# Patient Record
Sex: FEMALE | Race: WHITE | NOT HISPANIC OR LATINO | Employment: OTHER | URBAN - METROPOLITAN AREA
[De-identification: names, ages, dates, MRNs, and addresses within clinical notes are randomized per-mention and may not be internally consistent; named-entity substitution may affect disease eponyms.]

---

## 2017-06-22 ENCOUNTER — OFFICE VISIT (OUTPATIENT)
Dept: GASTROENTEROLOGY | Facility: CLINIC | Age: 82
End: 2017-06-22

## 2017-06-22 VITALS
DIASTOLIC BLOOD PRESSURE: 82 MMHG | TEMPERATURE: 97.9 F | SYSTOLIC BLOOD PRESSURE: 124 MMHG | HEIGHT: 62 IN | BODY MASS INDEX: 28.63 KG/M2 | WEIGHT: 155.6 LBS

## 2017-06-22 DIAGNOSIS — R93.3 ABNORMAL ESOPHAGRAM: ICD-10-CM

## 2017-06-22 DIAGNOSIS — K21.9 GASTROESOPHAGEAL REFLUX DISEASE, ESOPHAGITIS PRESENCE NOT SPECIFIED: ICD-10-CM

## 2017-06-22 DIAGNOSIS — R13.10 DYSPHAGIA, UNSPECIFIED TYPE: Primary | ICD-10-CM

## 2017-06-22 DIAGNOSIS — R11.0 NAUSEA: ICD-10-CM

## 2017-06-22 PROCEDURE — 99214 OFFICE O/P EST MOD 30 MIN: CPT | Performed by: NURSE PRACTITIONER

## 2017-06-22 RX ORDER — ATENOLOL 25 MG/1
25 TABLET ORAL DAILY
COMMUNITY
End: 2018-05-18 | Stop reason: SDUPTHER

## 2017-06-22 RX ORDER — LEVOTHYROXINE SODIUM 100 UG/1
100 CAPSULE ORAL DAILY
COMMUNITY
End: 2019-09-30 | Stop reason: ALTCHOICE

## 2017-06-22 RX ORDER — GLIMEPIRIDE 4 MG/1
4 TABLET ORAL 2 TIMES DAILY
COMMUNITY
End: 2020-09-25

## 2017-06-22 RX ORDER — ERGOCALCIFEROL 1.25 MG/1
50000 CAPSULE ORAL WEEKLY
COMMUNITY
End: 2019-09-30 | Stop reason: ALTCHOICE

## 2017-06-22 RX ORDER — OMEPRAZOLE 20 MG/1
20 CAPSULE, DELAYED RELEASE ORAL 2 TIMES DAILY
Qty: 180 CAPSULE | Refills: 3 | Status: SHIPPED | OUTPATIENT
Start: 2017-06-22 | End: 2021-12-01 | Stop reason: SDUPTHER

## 2017-06-22 RX ORDER — OMEPRAZOLE 20 MG/1
20 CAPSULE, DELAYED RELEASE ORAL DAILY
COMMUNITY
End: 2017-06-22 | Stop reason: SDUPTHER

## 2017-06-22 RX ORDER — PIOGLITAZONEHYDROCHLORIDE 15 MG/1
15 TABLET ORAL DAILY
COMMUNITY
End: 2018-04-12 | Stop reason: HOSPADM

## 2017-06-22 RX ORDER — SODIUM CHLORIDE 0.9 % (FLUSH) 0.9 %
1-10 SYRINGE (ML) INJECTION AS NEEDED
Status: CANCELLED | OUTPATIENT
Start: 2017-06-22

## 2017-06-22 NOTE — PROGRESS NOTES
"Chief Complaint   Patient presents with   • Nausea   • Abdominal Pain     Subjective     HPI  Lisbet Wilks is a 83 y.o. female who presents for a follow up. Her last office visit with Dr. Fernandez was August 2014 after EGD and colonoscopy July 2014. EGD with gastritis negative H. Pylori.  Colonoscopy with nonbleeding internal hemorrhoids, diverticular disease of sigmoid colon, otherwise normal.   She is here today for complaints of throat irritation, nausea daily, excessive burping, and trouble swallowing.  She states on a daily basis she has difficulty swallowing her pills. They get \"stuck in her throat\"  Forcing her to regurgitate them back up.  She has simialar trouble swallowing solid foods.  She was seen by Dr. Martinez, her ENT, 2 weeks ago for these symptoms and recommended she follow up with GI. Dr. Martinez had ordered an esophagram  in 2015 for complaints of dysphagia.  She was instructed to follow-up with GI at that time but hoped that her symptoms would resolve.  Esophagram in 2015 demonstrated a small sliding hiatal herniation, normal caliber esophagogastric junction, suboptimal esophageal peristalsis, and some spasticity in the distal thoracic esophagus with retention of barium liquid mixture in the esophagus when supine.  Her  trouble swallowing has worsened and is now a  daily occurrence.   She is a long-standing type II diabetic.  She reports early satiety and inability to eat anything but very small meals.  She denies vomiting, hematemesis, odynophagia, melena, or bright red blood per rectum.  Her weight is stable.    Past Medical History:   Diagnosis Date   • Diabetes    • Fatty liver    • Sleep apnea    • Thyroid disorder      Past Surgical History:   Procedure Laterality Date   • APPENDECTOMY     • CATARACT EXTRACTION     • COLONOSCOPY  07/15/2014    NBIH, tics,    • ENDOSCOPY  07/15/2014    acute gastritis   • HYSTERECTOMY     • THYROID BIOPSY     • TONSILLECTOMY         Social History     Social " History   • Marital status:      Spouse name: N/A   • Number of children: N/A   • Years of education: N/A     Social History Main Topics   • Smoking status: Former Smoker   • Smokeless tobacco: None   • Alcohol use No   • Drug use: None   • Sexual activity: Not Asked     Other Topics Concern   • None     Social History Narrative         Current Outpatient Prescriptions:   •  atenolol (TENORMIN) 25 MG tablet, Take 25 mg by mouth Daily., Disp: , Rfl:   •  glimepiride (AMARYL) 4 MG tablet, Take 4 mg by mouth 2 (Two) Times a Day., Disp: , Rfl:   •  levothyroxine (SYNTHROID, LEVOTHROID) 88 MCG tablet, Take 88 mcg by mouth Daily., Disp: , Rfl:   •  omeprazole (priLOSEC) 20 MG capsule, Take 1 capsule by mouth 2 (Two) Times a Day., Disp: 180 capsule, Rfl: 3  •  pioglitazone (ACTOS) 15 MG tablet, Take 15 mg by mouth Daily., Disp: , Rfl:   •  vitamin D (ERGOCALCIFEROL) 92086 UNITS capsule capsule, Take 50,000 Units by mouth 1 (One) Time Per Week., Disp: , Rfl:     Review of Systems   Constitutional: Negative for appetite change, chills, fatigue, fever and unexpected weight change.   HENT: Positive for sore throat and trouble swallowing.    Respiratory: Negative for choking and shortness of breath.    Cardiovascular: Negative for chest pain.   Gastrointestinal: Positive for nausea. Negative for abdominal distention, abdominal pain, blood in stool, constipation, diarrhea and vomiting.        Per HPI   Musculoskeletal: Negative for back pain.   Skin: Negative for color change.   Neurological: Negative for dizziness.   Hematological: Does not bruise/bleed easily.   Psychiatric/Behavioral: Negative.        Objective   Vitals:    06/22/17 1102   BP: 124/82   Temp: 97.9 °F (36.6 °C)       Physical Exam   Constitutional: She is oriented to person, place, and time. She appears well-developed and well-nourished.   HENT:   Head: Normocephalic and atraumatic.   Eyes: Pupils are equal, round, and reactive to light.    Cardiovascular: Normal rate, regular rhythm and normal heart sounds.    Pulmonary/Chest: Effort normal and breath sounds normal.   Abdominal: Soft. Bowel sounds are normal. She exhibits no shifting dullness, no distension, no pulsatile liver, no fluid wave, no abdominal bruit, no ascites, no pulsatile midline mass and no mass. There is no hepatosplenomegaly. There is no tenderness. There is no rigidity and no guarding. No hernia.   Musculoskeletal: Normal range of motion.   Neurological: She is alert and oriented to person, place, and time.   Skin: Skin is warm and dry.   Psychiatric: She has a normal mood and affect. Her behavior is normal. Thought content normal.   Nursing note and vitals reviewed.      Assessment/Plan   Violet was seen today for nausea and abdominal pain.    Diagnoses and all orders for this visit:    Dysphagia, unspecified type  Comments:  Trouble swallowing Pills and solids    Abnormal esophagram  Comments:  Esophagram 2015- patient was not seen until now for abnormal esophagram  Orders:  -     Case Request; Standing  -     sodium chloride 0.9 % flush 1-10 mL; Infuse 1-10 mL into a venous catheter As Needed for Line Care.  -     Case Request    Nausea    Gastroesophageal reflux disease, esophagitis presence not specified  Comments:  Increase omeprazole to 20 mg twice a day    Other orders  -     Implement Anesthesia orders day of procedure.; Standing  -     Obtain informed consent; Standing  -     Insert Peripheral IV; Standing  -     Saline Lock & Maintain IV Access; Standing  -     omeprazole (priLOSEC) 20 MG capsule; Take 1 capsule by mouth 2 (Two) Times a Day.    EGD with Dr. Fernandez on 7/19/17.  Discussed safe swallowing precautions until EGD can be performed.  Increase PPI to twice a day.  Consider her evaluation of her symptoms with gastric emptying study with hx of  DM diagnosed over 10 years ago.   For any additional questions, concerns or changes to your condition after today's  office visit please contact the office at [phone].  Follow up after endoscopic evaluation.    Stephanie Smith [facility]  [facility] [place] [address]  Office: [phone]

## 2018-04-09 ENCOUNTER — HOSPITAL ENCOUNTER (INPATIENT)
Facility: HOSPITAL | Age: 83
LOS: 2 days | Discharge: HOME | End: 2018-04-12
Attending: EMERGENCY MEDICINE | Admitting: INTERNAL MEDICINE

## 2018-04-09 ENCOUNTER — APPOINTMENT (OUTPATIENT)
Dept: GENERAL RADIOLOGY | Facility: HOSPITAL | Age: 83
End: 2018-04-09

## 2018-04-09 DIAGNOSIS — E16.2 HYPOGLYCEMIA: ICD-10-CM

## 2018-04-09 DIAGNOSIS — S22.41XA CLOSED FRACTURE OF MULTIPLE RIBS OF RIGHT SIDE, INITIAL ENCOUNTER: ICD-10-CM

## 2018-04-09 DIAGNOSIS — I24.9 ACUTE CORONARY SYNDROME (HCC): Primary | ICD-10-CM

## 2018-04-09 PROCEDURE — 36415 COLL VENOUS BLD VENIPUNCTURE: CPT

## 2018-04-09 PROCEDURE — 99285 EMERGENCY DEPT VISIT HI MDM: CPT

## 2018-04-09 PROCEDURE — 71101 X-RAY EXAM UNILAT RIBS/CHEST: CPT

## 2018-04-10 ENCOUNTER — APPOINTMENT (OUTPATIENT)
Dept: CT IMAGING | Facility: HOSPITAL | Age: 83
End: 2018-04-10

## 2018-04-10 ENCOUNTER — APPOINTMENT (OUTPATIENT)
Dept: CARDIOLOGY | Facility: HOSPITAL | Age: 83
End: 2018-04-10
Attending: INTERNAL MEDICINE

## 2018-04-10 PROBLEM — K21.9 GERD (GASTROESOPHAGEAL REFLUX DISEASE): Status: ACTIVE | Noted: 2018-04-10

## 2018-04-10 PROBLEM — I24.9 ACUTE CORONARY SYNDROME: Status: ACTIVE | Noted: 2018-04-10

## 2018-04-10 PROBLEM — I31.39 PERICARDIAL EFFUSION: Status: ACTIVE | Noted: 2018-04-10

## 2018-04-10 PROBLEM — I21.4 NSTEMI (NON-ST ELEVATED MYOCARDIAL INFARCTION) (HCC): Status: ACTIVE | Noted: 2018-04-10

## 2018-04-10 PROBLEM — S22.39XA RIB FRACTURE: Status: ACTIVE | Noted: 2018-04-10

## 2018-04-10 PROBLEM — E11.649 TYPE 2 DIABETES MELLITUS WITH HYPOGLYCEMIA (HCC): Status: ACTIVE | Noted: 2018-04-10

## 2018-04-10 PROBLEM — Z66 DNR (DO NOT RESUSCITATE): Status: ACTIVE | Noted: 2018-04-10

## 2018-04-10 LAB
ALBUMIN SERPL-MCNC: 3.3 G/DL (ref 3.5–5.2)
ALBUMIN/GLOB SERPL: 0.9 G/DL
ALP SERPL-CCNC: 78 U/L (ref 39–117)
ALT SERPL W P-5'-P-CCNC: 30 U/L (ref 1–33)
ANION GAP SERPL CALCULATED.3IONS-SCNC: 11.8 MMOL/L
ANION GAP SERPL CALCULATED.3IONS-SCNC: 15.9 MMOL/L
AORTIC DIMENSIONLESS INDEX: 0.8 (DI)
AST SERPL-CCNC: 24 U/L (ref 1–32)
BACTERIA UR QL AUTO: ABNORMAL /HPF
BASOPHILS # BLD AUTO: 0.03 10*3/MM3 (ref 0–0.2)
BASOPHILS NFR BLD AUTO: 0.3 % (ref 0–1.5)
BH CV ECHO MEAS - ACS: 1.6 CM
BH CV ECHO MEAS - AI DEC SLOPE: 129 CM/SEC^2
BH CV ECHO MEAS - AI MAX PG: 34.8 MMHG
BH CV ECHO MEAS - AI MAX VEL: 295 CM/SEC
BH CV ECHO MEAS - AI P1/2T: 669.8 MSEC
BH CV ECHO MEAS - AO MEAN PG (FULL): 2 MMHG
BH CV ECHO MEAS - AO MEAN PG: 5 MMHG
BH CV ECHO MEAS - AO ROOT AREA (BSA CORRECTED): 1.6
BH CV ECHO MEAS - AO ROOT AREA: 5.3 CM^2
BH CV ECHO MEAS - AO ROOT DIAM: 2.6 CM
BH CV ECHO MEAS - AO V2 MAX: 149 CM/SEC
BH CV ECHO MEAS - AO V2 MEAN: 102 CM/SEC
BH CV ECHO MEAS - AO V2 VTI: 33.7 CM
BH CV ECHO MEAS - ASC AORTA: 3.1 CM
BH CV ECHO MEAS - AVA(I,A): 2.1 CM^2
BH CV ECHO MEAS - AVA(I,D): 2.1 CM^2
BH CV ECHO MEAS - BSA(HAYCOCK): 1.7 M^2
BH CV ECHO MEAS - BSA: 1.6 M^2
BH CV ECHO MEAS - BZI_BMI: 25.4 KILOGRAMS/M^2
BH CV ECHO MEAS - BZI_METRIC_HEIGHT: 157.5 CM
BH CV ECHO MEAS - BZI_METRIC_WEIGHT: 63.1 KG
BH CV ECHO MEAS - CONTRAST EF (2CH): 63 ML/M^2
BH CV ECHO MEAS - CONTRAST EF 4CH: 66.1 ML/M^2
BH CV ECHO MEAS - EDV(CUBED): 117.6 ML
BH CV ECHO MEAS - EDV(MOD-SP2): 54 ML
BH CV ECHO MEAS - EDV(MOD-SP4): 56 ML
BH CV ECHO MEAS - EDV(TEICH): 112.8 ML
BH CV ECHO MEAS - EF(CUBED): 77.1 %
BH CV ECHO MEAS - EF(MOD-SP2): 63 %
BH CV ECHO MEAS - EF(MOD-SP4): 66.1 %
BH CV ECHO MEAS - EF(TEICH): 69 %
BH CV ECHO MEAS - ESV(CUBED): 27 ML
BH CV ECHO MEAS - ESV(MOD-SP2): 20 ML
BH CV ECHO MEAS - ESV(MOD-SP4): 19 ML
BH CV ECHO MEAS - ESV(TEICH): 35 ML
BH CV ECHO MEAS - FS: 38.8 %
BH CV ECHO MEAS - IVS/LVPW: 0.82
BH CV ECHO MEAS - IVSD: 0.9 CM
BH CV ECHO MEAS - LAT PEAK E' VEL: 5 CM/SEC
BH CV ECHO MEAS - LV DIASTOLIC VOL/BSA (35-75): 34.2 ML/M^2
BH CV ECHO MEAS - LV MASS(C)D: 176 GRAMS
BH CV ECHO MEAS - LV MASS(C)DI: 107.5 GRAMS/M^2
BH CV ECHO MEAS - LV MEAN PG: 3 MMHG
BH CV ECHO MEAS - LV SYSTOLIC VOL/BSA (12-30): 11.6 ML/M^2
BH CV ECHO MEAS - LV V1 MAX: 120 CM/SEC
BH CV ECHO MEAS - LV V1 MEAN: 73.5 CM/SEC
BH CV ECHO MEAS - LV V1 VTI: 28.2 CM
BH CV ECHO MEAS - LVIDD: 4.9 CM
BH CV ECHO MEAS - LVIDS: 3 CM
BH CV ECHO MEAS - LVLD AP2: 6.2 CM
BH CV ECHO MEAS - LVLD AP4: 5.9 CM
BH CV ECHO MEAS - LVLS AP2: 5.3 CM
BH CV ECHO MEAS - LVLS AP4: 4.8 CM
BH CV ECHO MEAS - LVOT AREA (M): 2.5 CM^2
BH CV ECHO MEAS - LVOT AREA: 2.5 CM^2
BH CV ECHO MEAS - LVOT DIAM: 1.8 CM
BH CV ECHO MEAS - LVPWD: 1.1 CM
BH CV ECHO MEAS - MED PEAK E' VEL: 4 CM/SEC
BH CV ECHO MEAS - MR ALIAS VEL: 38.5 CM/SEC
BH CV ECHO MEAS - MR ERO: 0.29 CM^2
BH CV ECHO MEAS - MR FLOW RATE: 118.5 CM^3/SEC
BH CV ECHO MEAS - MR MAX PG: 69 MMHG
BH CV ECHO MEAS - MR MAX VEL: 415 CM/SEC
BH CV ECHO MEAS - MR PISA RADIUS: 0.7 CM
BH CV ECHO MEAS - MR PISA: 3.1 CM^2
BH CV ECHO MEAS - MV A DUR: 1.2 SEC
BH CV ECHO MEAS - MV A MAX VEL: 91.3 CM/SEC
BH CV ECHO MEAS - MV DEC SLOPE: 455 CM/SEC^2
BH CV ECHO MEAS - MV DEC TIME: 0.19 SEC
BH CV ECHO MEAS - MV E MAX VEL: 60.2 CM/SEC
BH CV ECHO MEAS - MV E/A: 0.66
BH CV ECHO MEAS - MV MEAN PG: 2 MMHG
BH CV ECHO MEAS - MV P1/2T MAX VEL: 106 CM/SEC
BH CV ECHO MEAS - MV P1/2T: 68.2 MSEC
BH CV ECHO MEAS - MV V2 MEAN: 66.3 CM/SEC
BH CV ECHO MEAS - MV V2 VTI: 35.2 CM
BH CV ECHO MEAS - MVA P1/2T LCG: 2.1 CM^2
BH CV ECHO MEAS - MVA(P1/2T): 3.2 CM^2
BH CV ECHO MEAS - MVA(VTI): 2 CM^2
BH CV ECHO MEAS - PA ACC SLOPE: 13.8 CM/SEC^2
BH CV ECHO MEAS - PA ACC TIME: 0.1 SEC
BH CV ECHO MEAS - PA MAX PG (FULL): 0.54 MMHG
BH CV ECHO MEAS - PA MAX PG: 2 MMHG
BH CV ECHO MEAS - PA PR(ACCEL): 34.5 MMHG
BH CV ECHO MEAS - PA V2 MAX: 70.6 CM/SEC
BH CV ECHO MEAS - PULM A REVS DUR: 1.2 SEC
BH CV ECHO MEAS - PULM A REVS VEL: 34.6 CM/SEC
BH CV ECHO MEAS - PULM DIAS VEL: 31.6 CM/SEC
BH CV ECHO MEAS - PULM S/D: 2.1
BH CV ECHO MEAS - PULM SYS VEL: 66.1 CM/SEC
BH CV ECHO MEAS - PVA(V,A): 2.7 CM^2
BH CV ECHO MEAS - PVA(V,D): 2.7 CM^2
BH CV ECHO MEAS - QP/QS: 0.56
BH CV ECHO MEAS - RAP SYSTOLE: 3 MMHG
BH CV ECHO MEAS - RV MAX PG: 1.4 MMHG
BH CV ECHO MEAS - RV MEAN PG: 1 MMHG
BH CV ECHO MEAS - RV V1 MAX: 60.2 CM/SEC
BH CV ECHO MEAS - RV V1 MEAN: 39 CM/SEC
BH CV ECHO MEAS - RV V1 VTI: 12.9 CM
BH CV ECHO MEAS - RVOT AREA: 3.1 CM^2
BH CV ECHO MEAS - RVOT DIAM: 2 CM
BH CV ECHO MEAS - RVSP: 26.4 MMHG
BH CV ECHO MEAS - SI(AO): 109.3 ML/M^2
BH CV ECHO MEAS - SI(CUBED): 55.4 ML/M^2
BH CV ECHO MEAS - SI(LVOT): 43.8 ML/M^2
BH CV ECHO MEAS - SI(MOD-SP2): 20.8 ML/M^2
BH CV ECHO MEAS - SI(MOD-SP4): 22.6 ML/M^2
BH CV ECHO MEAS - SI(TEICH): 47.5 ML/M^2
BH CV ECHO MEAS - SV(AO): 178.9 ML
BH CV ECHO MEAS - SV(CUBED): 90.6 ML
BH CV ECHO MEAS - SV(LVOT): 71.8 ML
BH CV ECHO MEAS - SV(MOD-SP2): 34 ML
BH CV ECHO MEAS - SV(MOD-SP4): 37 ML
BH CV ECHO MEAS - SV(RVOT): 40.5 ML
BH CV ECHO MEAS - SV(TEICH): 77.8 ML
BH CV ECHO MEAS - TAPSE (>1.6): 1.6 CM2
BH CV ECHO MEAS - TR MAX VEL: 242 CM/SEC
BH CV VAS BP RIGHT ARM: NORMAL MMHG
BH CV XLRA - RV BASE: 3.2 CM
BH CV XLRA - TDI S': 9 CM/SEC
BILIRUB SERPL-MCNC: 0.9 MG/DL (ref 0.1–1.2)
BILIRUB UR QL STRIP: NEGATIVE
BUN BLD-MCNC: 25 MG/DL (ref 8–23)
BUN BLD-MCNC: 26 MG/DL (ref 8–23)
BUN/CREAT SERPL: 27.1 (ref 7–25)
BUN/CREAT SERPL: 30.1 (ref 7–25)
CALCIUM SPEC-SCNC: 8.9 MG/DL (ref 8.6–10.5)
CALCIUM SPEC-SCNC: 9 MG/DL (ref 8.6–10.5)
CHLORIDE SERPL-SCNC: 103 MMOL/L (ref 98–107)
CHLORIDE SERPL-SCNC: 99 MMOL/L (ref 98–107)
CLARITY UR: ABNORMAL
CO2 SERPL-SCNC: 22.1 MMOL/L (ref 22–29)
CO2 SERPL-SCNC: 24.2 MMOL/L (ref 22–29)
COLOR UR: YELLOW
CREAT BLD-MCNC: 0.83 MG/DL (ref 0.57–1)
CREAT BLD-MCNC: 0.96 MG/DL (ref 0.57–1)
DEPRECATED RDW RBC AUTO: 43.5 FL (ref 37–54)
E/E' RATIO: 14.5
EOSINOPHIL # BLD AUTO: 0.2 10*3/MM3 (ref 0–0.7)
EOSINOPHIL NFR BLD AUTO: 1.8 % (ref 0.3–6.2)
ERYTHROCYTE [DISTWIDTH] IN BLOOD BY AUTOMATED COUNT: 13 % (ref 11.7–13)
GFR SERPL CREATININE-BSD FRML MDRD: 55 ML/MIN/1.73
GFR SERPL CREATININE-BSD FRML MDRD: 65 ML/MIN/1.73
GLOBULIN UR ELPH-MCNC: 3.5 GM/DL
GLUCOSE BLD-MCNC: 207 MG/DL (ref 65–99)
GLUCOSE BLD-MCNC: 40 MG/DL (ref 65–99)
GLUCOSE BLDC GLUCOMTR-MCNC: 130 MG/DL (ref 70–130)
GLUCOSE BLDC GLUCOMTR-MCNC: 135 MG/DL (ref 70–130)
GLUCOSE BLDC GLUCOMTR-MCNC: 154 MG/DL (ref 70–130)
GLUCOSE BLDC GLUCOMTR-MCNC: 191 MG/DL (ref 70–130)
GLUCOSE BLDC GLUCOMTR-MCNC: 260 MG/DL (ref 70–130)
GLUCOSE BLDC GLUCOMTR-MCNC: 43 MG/DL (ref 70–130)
GLUCOSE UR STRIP-MCNC: NEGATIVE MG/DL
HCT VFR BLD AUTO: 38.2 % (ref 35.6–45.5)
HGB BLD-MCNC: 11.7 G/DL (ref 11.9–15.5)
HGB UR QL STRIP.AUTO: ABNORMAL
HYALINE CASTS UR QL AUTO: ABNORMAL /LPF
IMM GRANULOCYTES # BLD: 0.02 10*3/MM3 (ref 0–0.03)
IMM GRANULOCYTES NFR BLD: 0.2 % (ref 0–0.5)
KETONES UR QL STRIP: ABNORMAL
LEFT ATRIUM VOLUME INDEX: 29.9 ML/M2
LEUKOCYTE ESTERASE UR QL STRIP.AUTO: ABNORMAL
LIPASE SERPL-CCNC: 27 U/L (ref 13–60)
LV EF 2D ECHO EST: 66 %
LYMPHOCYTES # BLD AUTO: 2.48 10*3/MM3 (ref 0.9–4.8)
LYMPHOCYTES NFR BLD AUTO: 22.3 % (ref 19.6–45.3)
MAXIMAL PREDICTED HEART RATE: 136 BPM
MCH RBC QN AUTO: 28.5 PG (ref 26.9–32)
MCHC RBC AUTO-ENTMCNC: 30.6 G/DL (ref 32.4–36.3)
MCV RBC AUTO: 92.9 FL (ref 80.5–98.2)
MONOCYTES # BLD AUTO: 1.09 10*3/MM3 (ref 0.2–1.2)
MONOCYTES NFR BLD AUTO: 9.8 % (ref 5–12)
NEUTROPHILS # BLD AUTO: 7.3 10*3/MM3 (ref 1.9–8.1)
NEUTROPHILS NFR BLD AUTO: 65.6 % (ref 42.7–76)
NITRITE UR QL STRIP: NEGATIVE
PH UR STRIP.AUTO: <=5 [PH] (ref 5–8)
PLATELET # BLD AUTO: 234 10*3/MM3 (ref 140–500)
PMV BLD AUTO: 10.3 FL (ref 6–12)
POTASSIUM BLD-SCNC: 4.4 MMOL/L (ref 3.5–5.2)
POTASSIUM BLD-SCNC: 4.5 MMOL/L (ref 3.5–5.2)
PROT SERPL-MCNC: 6.8 G/DL (ref 6–8.5)
PROT UR QL STRIP: NEGATIVE
RBC # BLD AUTO: 4.11 10*6/MM3 (ref 3.9–5.2)
RBC # UR: ABNORMAL /HPF
REF LAB TEST METHOD: ABNORMAL
SODIUM BLD-SCNC: 135 MMOL/L (ref 136–145)
SODIUM BLD-SCNC: 141 MMOL/L (ref 136–145)
SP GR UR STRIP: 1.02 (ref 1–1.03)
SQUAMOUS #/AREA URNS HPF: ABNORMAL /HPF
STRESS TARGET HR: 116 BPM
TROPONIN T SERPL-MCNC: 2.03 NG/ML (ref 0–0.03)
TROPONIN T SERPL-MCNC: 2.61 NG/ML (ref 0–0.03)
UROBILINOGEN UR QL STRIP: ABNORMAL
WBC NRBC COR # BLD: 11.12 10*3/MM3 (ref 4.5–10.7)
WBC UR QL AUTO: ABNORMAL /HPF

## 2018-04-10 PROCEDURE — 84484 ASSAY OF TROPONIN QUANT: CPT | Performed by: EMERGENCY MEDICINE

## 2018-04-10 PROCEDURE — 74176 CT ABD & PELVIS W/O CONTRAST: CPT

## 2018-04-10 PROCEDURE — 25010000002 ENOXAPARIN PER 10 MG: Performed by: INTERNAL MEDICINE

## 2018-04-10 PROCEDURE — 93010 ELECTROCARDIOGRAM REPORT: CPT | Performed by: INTERNAL MEDICINE

## 2018-04-10 PROCEDURE — 80053 COMPREHEN METABOLIC PANEL: CPT | Performed by: EMERGENCY MEDICINE

## 2018-04-10 PROCEDURE — 83690 ASSAY OF LIPASE: CPT | Performed by: EMERGENCY MEDICINE

## 2018-04-10 PROCEDURE — 93306 TTE W/DOPPLER COMPLETE: CPT

## 2018-04-10 PROCEDURE — 93005 ELECTROCARDIOGRAM TRACING: CPT | Performed by: HOSPITALIST

## 2018-04-10 PROCEDURE — 82962 GLUCOSE BLOOD TEST: CPT

## 2018-04-10 PROCEDURE — 93306 TTE W/DOPPLER COMPLETE: CPT | Performed by: INTERNAL MEDICINE

## 2018-04-10 PROCEDURE — 93005 ELECTROCARDIOGRAM TRACING: CPT | Performed by: EMERGENCY MEDICINE

## 2018-04-10 PROCEDURE — 81001 URINALYSIS AUTO W/SCOPE: CPT | Performed by: EMERGENCY MEDICINE

## 2018-04-10 PROCEDURE — 99222 1ST HOSP IP/OBS MODERATE 55: CPT | Performed by: INTERNAL MEDICINE

## 2018-04-10 PROCEDURE — 84484 ASSAY OF TROPONIN QUANT: CPT | Performed by: INTERNAL MEDICINE

## 2018-04-10 PROCEDURE — 85025 COMPLETE CBC W/AUTO DIFF WBC: CPT | Performed by: EMERGENCY MEDICINE

## 2018-04-10 RX ORDER — ATENOLOL 25 MG/1
25 TABLET ORAL DAILY
Status: DISCONTINUED | OUTPATIENT
Start: 2018-04-10 | End: 2018-04-12 | Stop reason: HOSPADM

## 2018-04-10 RX ORDER — ONDANSETRON 2 MG/ML
4 INJECTION INTRAMUSCULAR; INTRAVENOUS EVERY 6 HOURS PRN
Status: DISCONTINUED | OUTPATIENT
Start: 2018-04-10 | End: 2018-04-12 | Stop reason: HOSPADM

## 2018-04-10 RX ORDER — PANTOPRAZOLE SODIUM 40 MG/1
40 TABLET, DELAYED RELEASE ORAL
Status: DISCONTINUED | OUTPATIENT
Start: 2018-04-10 | End: 2018-04-12 | Stop reason: HOSPADM

## 2018-04-10 RX ORDER — MORPHINE SULFATE 2 MG/ML
2 INJECTION, SOLUTION INTRAMUSCULAR; INTRAVENOUS
Status: DISCONTINUED | OUTPATIENT
Start: 2018-04-10 | End: 2018-04-12 | Stop reason: HOSPADM

## 2018-04-10 RX ORDER — ROSUVASTATIN CALCIUM 10 MG/1
10 TABLET, COATED ORAL DAILY
COMMUNITY
End: 2020-10-23

## 2018-04-10 RX ORDER — LEVOTHYROXINE SODIUM 0.1 MG/1
100 TABLET ORAL
Status: DISCONTINUED | OUTPATIENT
Start: 2018-04-10 | End: 2018-04-12 | Stop reason: HOSPADM

## 2018-04-10 RX ORDER — ONDANSETRON 4 MG/1
4 TABLET, ORALLY DISINTEGRATING ORAL EVERY 6 HOURS PRN
Status: DISCONTINUED | OUTPATIENT
Start: 2018-04-10 | End: 2018-04-12 | Stop reason: HOSPADM

## 2018-04-10 RX ORDER — ATORVASTATIN CALCIUM 20 MG/1
20 TABLET, FILM COATED ORAL DAILY
Status: DISCONTINUED | OUTPATIENT
Start: 2018-04-10 | End: 2018-04-12 | Stop reason: HOSPADM

## 2018-04-10 RX ORDER — NITROGLYCERIN 0.4 MG/1
0.4 TABLET SUBLINGUAL
Status: DISCONTINUED | OUTPATIENT
Start: 2018-04-10 | End: 2018-04-12 | Stop reason: HOSPADM

## 2018-04-10 RX ORDER — ACETAMINOPHEN 325 MG/1
650 TABLET ORAL EVERY 4 HOURS PRN
Status: DISCONTINUED | OUTPATIENT
Start: 2018-04-10 | End: 2018-04-12 | Stop reason: HOSPADM

## 2018-04-10 RX ORDER — SODIUM CHLORIDE 0.9 % (FLUSH) 0.9 %
10 SYRINGE (ML) INJECTION AS NEEDED
Status: DISCONTINUED | OUTPATIENT
Start: 2018-04-10 | End: 2018-04-12 | Stop reason: HOSPADM

## 2018-04-10 RX ORDER — ONDANSETRON 4 MG/1
4 TABLET, FILM COATED ORAL EVERY 6 HOURS PRN
Status: DISCONTINUED | OUTPATIENT
Start: 2018-04-10 | End: 2018-04-12 | Stop reason: HOSPADM

## 2018-04-10 RX ORDER — ASPIRIN 81 MG/1
81 TABLET ORAL DAILY
Status: DISCONTINUED | OUTPATIENT
Start: 2018-04-10 | End: 2018-04-12 | Stop reason: HOSPADM

## 2018-04-10 RX ORDER — SODIUM CHLORIDE 0.9 % (FLUSH) 0.9 %
1-10 SYRINGE (ML) INJECTION AS NEEDED
Status: DISCONTINUED | OUTPATIENT
Start: 2018-04-10 | End: 2018-04-12 | Stop reason: HOSPADM

## 2018-04-10 RX ORDER — ASPIRIN 325 MG
325 TABLET ORAL ONCE
Status: COMPLETED | OUTPATIENT
Start: 2018-04-10 | End: 2018-04-10

## 2018-04-10 RX ORDER — NALOXONE HCL 0.4 MG/ML
0.4 VIAL (ML) INJECTION
Status: DISCONTINUED | OUTPATIENT
Start: 2018-04-10 | End: 2018-04-12 | Stop reason: HOSPADM

## 2018-04-10 RX ORDER — ZOLPIDEM TARTRATE 10 MG/1
10 TABLET ORAL NIGHTLY PRN
COMMUNITY
End: 2020-06-09 | Stop reason: SDUPTHER

## 2018-04-10 RX ADMIN — LEVOTHYROXINE SODIUM 100 MCG: 100 TABLET ORAL at 07:09

## 2018-04-10 RX ADMIN — ENOXAPARIN SODIUM 40 MG: 40 INJECTION SUBCUTANEOUS at 09:20

## 2018-04-10 RX ADMIN — PANTOPRAZOLE SODIUM 40 MG: 40 TABLET, DELAYED RELEASE ORAL at 07:09

## 2018-04-10 RX ADMIN — ASPIRIN 325 MG: 325 TABLET ORAL at 02:19

## 2018-04-10 RX ADMIN — ATENOLOL 25 MG: 25 TABLET ORAL at 09:20

## 2018-04-10 RX ADMIN — ASPIRIN 81 MG: 81 TABLET ORAL at 09:20

## 2018-04-10 RX ADMIN — ATORVASTATIN CALCIUM 20 MG: 20 TABLET, FILM COATED ORAL at 13:55

## 2018-04-10 RX ADMIN — NITROGLYCERIN 1 INCH: 20 OINTMENT TOPICAL at 02:19

## 2018-04-10 NOTE — H&P
Patient Care Team:  Aleah Lopes MD as PCP - General  Aleah Lopes MD as PCP - Family Medicine    Chief complaint: chest pain    Subjective     History of Present Illness    85 yo with history of DM2, GERD, HTN. She presents with symptoms of chest pain. Started about 2-3 days ago. Actually resolved now. Symptoms of substernal burning. Also with heart palpitations. Intermittent, mild-moderate symptoms. Also of note about 2-3 weeks ago she had a fall and had some right sided chest/rib pain after that but those symptoms have improved.  Came to [facility] ER. Diagnosed with NSTEMI. Given ASA and NTG. No current chest pain.     Review of Systems   Constitutional: Negative.    HENT: Negative.    Eyes: Negative.    Respiratory: Positive for chest tightness. Negative for wheezing.    Cardiovascular: Positive for chest pain and palpitations. Negative for leg swelling.   Gastrointestinal: Positive for nausea. Negative for vomiting.   Endocrine: Negative.    Genitourinary: Negative.    Musculoskeletal: Negative.    Skin: Negative.    Allergic/Immunologic: Negative.    Neurological: Negative.    Hematological: Negative.    Psychiatric/Behavioral: Negative.         Past Medical History:   Diagnosis Date   • Benign essential HTN    • Diabetes    • Fatty liver    • GERD (gastroesophageal reflux disease) 4/10/2018   • Sleep apnea    • Thyroid disorder      Past Surgical History:   Procedure Laterality Date   • APPENDECTOMY     • CATARACT EXTRACTION     • COLONOSCOPY  07/15/2014    CARMELINA, tori,    • ENDOSCOPY  07/15/2014    acute gastritis   • HYSTERECTOMY     • THYROID BIOPSY     • TONSILLECTOMY       Family History   Problem Relation Age of Onset   • Colon cancer Maternal Uncle    • Colon cancer Maternal Uncle      Social History   Substance Use Topics   • Smoking status: Former Smoker     Packs/day: 1.00     Years: 15.00   • Smokeless tobacco: Former User      Comment: quit 1980   • Alcohol use No     Prescriptions Prior to  Admission   Medication Sig Dispense Refill Last Dose   • atenolol (TENORMIN) 25 MG tablet Take 25 mg by mouth Daily.   4/9/2018 at 0900   • glimepiride (AMARYL) 4 MG tablet Take 4 mg by mouth 2 (Two) Times a Day.   4/9/2018 at 0900   • levothyroxine sodium (TIROSINT) 100 MCG capsule Take 100 mcg by mouth Daily.   4/9/2018 at 0900   • omeprazole (priLOSEC) 20 MG capsule Take 1 capsule by mouth 2 (Two) Times a Day. (Patient taking differently: Take 20 mg by mouth Daily.) 180 capsule 3 4/9/2018 at 0900   • pioglitazone (ACTOS) 15 MG tablet Take 15 mg by mouth Daily.   4/9/2018 at 0900   • vitamin D (ERGOCALCIFEROL) 49435 UNITS capsule capsule Take 50,000 Units by mouth 1 (One) Time Per Week.   4/7/2018 at 0900     Allergies:  Review of patient's allergies indicates no known allergies.    Objective      Vital Signs  Temp:  [98.1 °F (36.7 °C)-98.2 °F (36.8 °C)] 98.2 °F (36.8 °C)  Heart Rate:  [62-72] 62  Resp:  [16-18] 16  BP: (118-161)/() 118/66    Physical Exam   Constitutional: She is oriented to person, place, and time. She appears well-developed and well-nourished. No distress.   HENT:   Head: Normocephalic and atraumatic.   Mouth/Throat: Oropharynx is clear and moist.   Eyes: EOM are normal. Pupils are equal, round, and reactive to light. No scleral icterus.   Neck: Normal range of motion. Neck supple.   Cardiovascular: Normal rate, regular rhythm and normal heart sounds.    No murmur heard.  Pulmonary/Chest: Effort normal and breath sounds normal.   Abdominal: Soft. Bowel sounds are normal. She exhibits no distension. There is no tenderness.   Genitourinary:   Genitourinary Comments: Deferred    Musculoskeletal: She exhibits no edema or deformity.   Neurological: She is alert and oriented to person, place, and time. No cranial nerve deficit.   Skin: Skin is warm and dry. No rash noted.   Psychiatric: She has a normal mood and affect. Her behavior is normal. Thought content normal.   Nursing note and vitals  reviewed.      Results Review:   I reviewed the patient's new clinical results.  CT Abdomen Pelvis Without Contrast [[id number]] Mustapha as Reviewed   Order Status: Completed Collected: 04/10/18 0058    Updated: 04/10/18 0105   Narrative:     NONCONTRAST CT SCANS ABDOMEN AND PELVIS     HISTORY: right side pain after fall     COMPARISON: None.     TECHNIQUE:Radiation dose reduction techniques were utilized, including  automated exposure control and exposure modulation based on body size.   Axial images were obtained from the lung bases to the symphysis pubis  without oral or IV contrast per request.      FINDINGS ABDOMEN CT:  There are old appearing right rib fractures with  periosteal reaction and callus formation. Moderate pericardial effusion  has developed. It is complex averaging 45 Hounsfield units. Follow-up  recommended. No nephrolithiasis or hydronephrosis. Tiny gallstones  present in a nondistended gallbladder. Remaining solid organs are  otherwise unremarkable without benefit of IV contrast. There are tiny  duodenal diverticuli. There is some subtle inflammation in the fat  surrounding the mid duodenum suggesting a nonspecific duodenitis.  Endoscopy could better evaluate.     FINDINGS PELVIS CT:  Absent uterus. Moderate colonic diverticulosis. The  GI tract not opacified for assessment but non obstructive in appearance.  The appendix is unable to be visualized for evaluation on this exam  without oral or IV contrast. Trace ascites. No loculated fluid  collection. Aorta nonaneurysmal.               Impression:     1.  Moderate complex pericardial effusion, new from previous, recommend  follow-up.  2. Uncomplicated cholelithiasis.  3. Mild nonspecific duodenitis.  4. Moderate diverticulosis.  5. Trace ascites.  6. Nonacute, healing right-sided rib fracture              This study was performed with techniques to keep radiation doses as low  as reasonably achievable (ALARA). Individualized dose  reduction  techniques using automated exposure control or adjustment of mA and/or  kV according to the patient size were employed.      This report was finalized on 4/10/2018 1:02 AM by Justin Crouch MD.      XR Ribs Right With PA Chest [[id number]] Mustapha as Reviewed   Order Status: Completed Collected: 04/09/18 2350    Updated: 04/09/18 2355   Narrative:     RIGHT RIB SERIES With PA Chest X-Ray     CLINICAL HISTORY: fall 3 weeks ago and persistent pain     FINDINGS: A total of 4 views of the right ribs were obtained. PA chest  x-ray included in this exam. There are 11 ribs with the right sixth rib  being partially bifid distally as normal variant. No displaced rib  fracture or other osseous abnormality of the right ribs is demonstrated.  On the PA chest radiograph, lungs well inflated and clear. No  pneumothorax.            Impression:     No evidence of right rib fracture appreciated     Urinalysis With / Microscopic If Indicated - Urine, Clean Catch [[id number]] (Abnormal) Collected: 04/10/18 0036   Lab Status: Final result Specimen: Urine from Urine, Clean Catch Updated: 04/10/18 0102    Color, UA Yellow    Appearance, UA Cloudy (A)    pH, UA <=5.0    Specific Gravity, UA 1.020    Glucose, UA Negative    Ketones, UA Trace (A)    Bilirubin, UA Negative    Blood, UA Small (1+) (A)    Protein, UA Negative    Leuk Esterase, UA Large (3+) (A)    Nitrite, UA Negative    Urobilinogen, UA 0.2 E.U./dL   Urinalysis, Microscopic Only - Urine, Clean Catch [[id number]] (Abnormal) Collected: 04/10/18 0036   Lab Status: Final result Specimen: Urine from Urine, Clean Catch Updated: 04/10/18 0130    RBC, UA 3-5 (A) /HPF     WBC, UA 13-20 (A) /HPF     Bacteria, UA None Seen /HPF     Squamous Epithelial Cells, UA 3-6 (A) /HPF     Hyaline Casts, UA None Seen /LPF     Methodology Manual Light Microscopy   Comprehensive Metabolic Panel [[id number]] (Abnormal) Collected: 04/10/18 0029   Lab Status: Final result Specimen: Blood Updated:  04/10/18 0114    Glucose 40 (C) mg/dL     BUN 26 (H) mg/dL     Creatinine 0.96 mg/dL     Sodium 141 mmol/L     Potassium 4.4 mmol/L     Chloride 103 mmol/L     CO2 22.1 mmol/L     Calcium 8.9 mg/dL     Total Protein 6.8 g/dL     Albumin 3.30 (L) g/dL     ALT (SGPT) 30 U/L     AST (SGOT) 24 U/L     Alkaline Phosphatase 78 U/L     Total Bilirubin 0.9 mg/dL     eGFR Non African Amer 55 (L) mL/min/1.73     Globulin 3.5 gm/dL     A/G Ratio 0.9 g/dL     BUN/Creatinine Ratio 27.1 (H)    Anion Gap 15.9 mmol/L    Narrative:     The MDRD GFR formula is only valid for adults with stable renal function between ages 18 and 70.   Lipase [[id number]] (Normal) Collected: 04/10/18 0029   Lab Status: Final result Specimen: Blood Updated: 04/10/18 0108    Lipase 27 U/L    Troponin [[id number]] (Abnormal) Collected: 04/10/18 0029   Lab Status: Final result Specimen: Blood Updated: 04/10/18 0113    Troponin T 2.610 (C) ng/mL    Narrative:     Troponin T Reference Ranges:  Less than 0.03 ng/mL:    Negative for AMI  0.03 to 0.09 ng/mL:      Indeterminant for AMI  Greater than 0.09 ng/mL: Positive for AMI   CBC Auto Differential [[id number]] (Abnormal) Collected: 04/10/18 0029   Lab Status: Final result Specimen: Blood Updated: 04/10/18 0056    WBC 11.12 (H) 10*3/mm3     RBC 4.11 10*6/mm3     Hemoglobin 11.7 (L) g/dL     Hematocrit 38.2 %     MCV 92.9 fL     MCH 28.5 pg     MCHC 30.6 (L) g/dL     RDW 13.0 %     RDW-SD 43.5 fl     MPV 10.3 fL     Platelets 234 10*3/mm3     Neutrophil % 65.6 %     Lymphocyte % 22.3 %     Monocyte % 9.8 %     Eosinophil % 1.8 %     Basophil % 0.3 %     Immature Grans % 0.2 %     Neutrophils, Absolute 7.30 10*3/mm3     Lymphocytes, Absolute 2.48 10*3/mm3     Monocytes, Absolute 1.09 10*3/mm3      EKG- sinus, inf Qwaves      Assessment/Plan     Principal Problem:    NSTEMI (non-ST elevated myocardial infarction)  Active Problems:    Type 2 diabetes mellitus with hypoglycemia    Pericardial effusion    GERD  (gastroesophageal reflux disease)    Rib fracture    DNR (do not resuscitate)    -Currently chest pain free. Cardiology consultation. ASA, BB, NTG.   -Hold oral DM medications. SSI, monitor BG  -DNR/DNI confirmed  -Rib fracture healing, not much pain  -Continue home meds including PPI    Assessment & Plan    I discussed the patients findings and my recommendations with patient, nursing staff and consulting provider    Ankush Bansal MD  04/10/18  6:30 AM

## 2018-04-10 NOTE — PLAN OF CARE
Problem: Patient Care Overview  Goal: Plan of Care Review   04/10/18 1525   OTHER   Outcome Summary database referral received, reviewed chart and discussed with nurse, pt is appropriate for PT eval, RN to pursue PT orders from MD

## 2018-04-10 NOTE — PLAN OF CARE
Problem: Patient Care Overview  Goal: Plan of Care Review  Outcome: Ongoing (interventions implemented as appropriate)   04/10/18 1640   Coping/Psychosocial   Plan of Care Reviewed With patient   Plan of Care Review   Progress improving   OTHER   Outcome Summary VSS. pt. denies pain, continues on room air. echo complete today. no S/S of distress or discomfort, irma continue to monitor     Goal: Individualization and Mutuality  Outcome: Ongoing (interventions implemented as appropriate)      Problem: Fall Risk (Adult)  Goal: Absence of Fall  Outcome: Ongoing (interventions implemented as appropriate)      Problem: Pain, Acute (Adult)  Goal: Acceptable Pain Control/Comfort Level  Outcome: Ongoing (interventions implemented as appropriate)      Problem: Diabetes, Type 2 (Adult)  Goal: Signs and Symptoms of Listed Potential Problems Will be Absent, Minimized or Managed (Diabetes, Type 2)  Outcome: Ongoing (interventions implemented as appropriate)      Problem: Cardiac: ACS (Acute Coronary Syndrome) (Adult)  Goal: Signs and Symptoms of Listed Potential Problems Will be Absent, Minimized or Managed (Cardiac: ACS)  Outcome: Ongoing (interventions implemented as appropriate)

## 2018-04-10 NOTE — ED PROVIDER NOTES
" EMERGENCY DEPARTMENT ENCOUNTER    CHIEF COMPLAINT  Chief Complaint: CP  History given by: pt   History limited by: none  Room Number: 34/34  PMD: Aleah Lopes MD      HPI:  Pt is a 84 y.o. female who presents complaining of intermittent burning CP \"up my esophagus\" over the past several days. Pt reports a fall 3 weeks ago onto her R side, with R chest wall pain that has been improving since then. Pt states that she has no complaints currently and denies nausea. Pt denies a past history of acid reflux.     Duration:  Several days   Onset: gradual   Timing: intermittent   Location: chest \"up my esophagus\"   Radiation: none stated   Quality: burning   Intensity/Severity: moderate   Progression: currently resolved   Associated Symptoms: R chest wall pain   Aggravating Factors: none stated   Alleviating Factors: none stated   Previous Episodes: none stated   Treatment before arrival: none     PAST MEDICAL HISTORY  Active Ambulatory Problems     Diagnosis Date Noted   • No Active Ambulatory Problems     Resolved Ambulatory Problems     Diagnosis Date Noted   • No Resolved Ambulatory Problems     Past Medical History:   Diagnosis Date   • Diabetes    • Fatty liver    • Sleep apnea    • Thyroid disorder        PAST SURGICAL HISTORY  Past Surgical History:   Procedure Laterality Date   • APPENDECTOMY     • CATARACT EXTRACTION     • COLONOSCOPY  07/15/2014    NBIH, tics,    • ENDOSCOPY  07/15/2014    acute gastritis   • HYSTERECTOMY     • THYROID BIOPSY     • TONSILLECTOMY         FAMILY HISTORY  Family History   Problem Relation Age of Onset   • Colon cancer Maternal Uncle    • Colon cancer Maternal Uncle        SOCIAL HISTORY  Social History     Social History   • Marital status:      Spouse name: N/A   • Number of children: N/A   • Years of education: N/A     Occupational History   • Not on file.     Social History Main Topics   • Smoking status: Former Smoker   • Smokeless tobacco: Not on file   • Alcohol " "use No   • Drug use: Unknown   • Sexual activity: Not on file     Other Topics Concern   • Not on file     Social History Narrative   • No narrative on file       ALLERGIES  Review of patient's allergies indicates no known allergies.    REVIEW OF SYSTEMS  Review of Systems   Constitutional: Negative for fever.   HENT: Negative for sore throat.    Eyes: Negative.    Respiratory: Negative for cough and shortness of breath.    Cardiovascular: Positive for chest pain (burning \"up my esophagus\").   Gastrointestinal: Negative for abdominal pain, diarrhea and vomiting.   Genitourinary: Negative for dysuria.   Musculoskeletal: Positive for myalgias (R chest wall). Negative for neck pain.   Skin: Negative for rash.   Allergic/Immunologic: Negative.    Neurological: Negative for weakness, numbness and headaches.   Hematological: Negative.    Psychiatric/Behavioral: Negative.    All other systems reviewed and are negative.      PHYSICAL EXAM  ED Triage Vitals   Temp Heart Rate Resp BP SpO2   04/09/18 2031 04/09/18 2009 04/09/18 2009 04/09/18 2031 04/09/18 2009   98.2 °F (36.8 °C) 71 18 133/84 98 %      Temp src Heart Rate Source Patient Position BP Location FiO2 (%)   04/09/18 2031 04/09/18 2009 04/09/18 2353 04/09/18 2353 --   Oral Monitor Lying Left arm        Physical Exam   Constitutional: She is oriented to person, place, and time and well-developed, well-nourished, and in no distress. No distress.   HENT:   Head: Normocephalic and atraumatic.   Eyes: EOM are normal. Pupils are equal, round, and reactive to light.   Neck: Normal range of motion. Neck supple.   Cardiovascular: Normal rate, regular rhythm and normal heart sounds.    Pulmonary/Chest: Effort normal and breath sounds normal. No respiratory distress. She exhibits tenderness (R chest wall).   Abdominal: Soft. There is tenderness (R sided). There is no rebound and no guarding.   Musculoskeletal: Normal range of motion. She exhibits no edema.   Neurological: She " is alert and oriented to person, place, and time. She has normal sensation and normal strength.   Skin: Skin is warm and dry. No rash noted.   Psychiatric: Mood and affect normal.   Nursing note and vitals reviewed.      LAB RESULTS  Lab Results (last 24 hours)     Procedure Component Value Units Date/Time    CBC & Differential [[id number]] Collected:  04/10/18 0029    Specimen:  Blood Updated:  04/10/18 0056    Narrative:       The following orders were created for panel order CBC & Differential.  Procedure                               Abnormality         Status                     ---------                               -----------         ------                     CBC Auto Differential[[id number]]         Abnormal            Final result                 Please view results for these tests on the individual orders.    Comprehensive Metabolic Panel [[id number]]  (Abnormal) Collected:  04/10/18 0029    Specimen:  Blood Updated:  04/10/18 0114     Glucose 40 (C) mg/dL      BUN 26 (H) mg/dL      Creatinine 0.96 mg/dL      Sodium 141 mmol/L      Potassium 4.4 mmol/L      Chloride 103 mmol/L      CO2 22.1 mmol/L      Calcium 8.9 mg/dL      Total Protein 6.8 g/dL      Albumin 3.30 (L) g/dL      ALT (SGPT) 30 U/L      AST (SGOT) 24 U/L      Alkaline Phosphatase 78 U/L      Total Bilirubin 0.9 mg/dL      eGFR Non African Amer 55 (L) mL/min/1.73      Globulin 3.5 gm/dL      A/G Ratio 0.9 g/dL      BUN/Creatinine Ratio 27.1 (H)     Anion Gap 15.9 mmol/L     Narrative:       The MDRD GFR formula is only valid for adults with stable renal function between ages 18 and 70.    Lipase [[id number]]  (Normal) Collected:  04/10/18 0029    Specimen:  Blood Updated:  04/10/18 0108     Lipase 27 U/L     Troponin [[id number]]  (Abnormal) Collected:  04/10/18 0029    Specimen:  Blood Updated:  04/10/18 0113     Troponin T 2.610 (C) ng/mL     Narrative:       Troponin T Reference Ranges:  Less than 0.03 ng/mL:    Negative for AMI  0.03 to 0.09  ng/mL:      Indeterminant for AMI  Greater than 0.09 ng/mL: Positive for AMI    CBC Auto Differential [[id number]]  (Abnormal) Collected:  04/10/18 0029    Specimen:  Blood Updated:  04/10/18 0056     WBC 11.12 (H) 10*3/mm3      RBC 4.11 10*6/mm3      Hemoglobin 11.7 (L) g/dL      Hematocrit 38.2 %      MCV 92.9 fL      MCH 28.5 pg      MCHC 30.6 (L) g/dL      RDW 13.0 %      RDW-SD 43.5 fl      MPV 10.3 fL      Platelets 234 10*3/mm3      Neutrophil % 65.6 %      Lymphocyte % 22.3 %      Monocyte % 9.8 %      Eosinophil % 1.8 %      Basophil % 0.3 %      Immature Grans % 0.2 %      Neutrophils, Absolute 7.30 10*3/mm3      Lymphocytes, Absolute 2.48 10*3/mm3      Monocytes, Absolute 1.09 10*3/mm3      Eosinophils, Absolute 0.20 10*3/mm3      Basophils, Absolute 0.03 10*3/mm3      Immature Grans, Absolute 0.02 10*3/mm3     Urinalysis With / Microscopic If Indicated - Urine, Clean Catch [[id number]]  (Abnormal) Collected:  04/10/18 0036    Specimen:  Urine from Urine, Clean Catch Updated:  04/10/18 0102     Color, UA Yellow     Appearance, UA Cloudy (A)     pH, UA <=5.0     Specific Gravity, UA 1.020     Glucose, UA Negative     Ketones, UA Trace (A)     Bilirubin, UA Negative     Blood, UA Small (1+) (A)     Protein, UA Negative     Leuk Esterase, UA Large (3+) (A)     Nitrite, UA Negative     Urobilinogen, UA 0.2 E.U./dL    Urinalysis, Microscopic Only - Urine, Clean Catch [[id number]]  (Abnormal) Collected:  04/10/18 0036    Specimen:  Urine from Urine, Clean Catch Updated:  04/10/18 0130     RBC, UA 3-5 (A) /HPF      WBC, UA 13-20 (A) /HPF      Bacteria, UA None Seen /HPF      Squamous Epithelial Cells, UA 3-6 (A) /HPF      Hyaline Casts, UA None Seen /LPF      Methodology Manual Light Microscopy    POC Glucose Once [[id number]]  (Abnormal) Collected:  04/10/18 0116    Specimen:  Blood Updated:  04/10/18 0117     Glucose 43 (C) mg/dL     Narrative:       MD Notified R and V Meter: [id number] : [id number] Lita  Francisca LAEGRIA    POC Glucose Once [[id number]]  (Abnormal) Collected:  04/10/18 0222    Specimen:  Blood Updated:  04/10/18 0223     Glucose 154 (H) mg/dL     Narrative:       Meter: [id number] : [id number] Lita Espinosa RN          I ordered the above labs and reviewed the results    RADIOLOGY  CT Abdomen Pelvis Without Contrast   Final Result   1.  Moderate complex pericardial effusion, new from previous, recommend   follow-up.   2. Uncomplicated cholelithiasis.   3. Mild nonspecific duodenitis.   4. Moderate diverticulosis.   5. Trace ascites.   6. Nonacute, healing right-sided rib fracture                   This study was performed with techniques to keep radiation doses as low   as reasonably achievable (ALARA). Individualized dose reduction   techniques using automated exposure control or adjustment of mA and/or   kV according to the patient size were employed.        This report was finalized on 4/10/2018 1:02 AM by Justin Crouch MD.          XR Ribs Right With PA Chest   Final Result   No evidence of right rib fracture appreciated       This report was finalized on 4/9/2018 11:52 PM by Justin Crouch MD.               I ordered the above noted radiological studies. Interpreted by radiologist. Reviewed by me in PACS.       PROCEDURES  Procedures  EKG    EKG time: 0133  Rhythm/Rate: sinus rhythm 66  ST elevation in the inferior leads  ST depression in lead I and AVL  Q wave in lead 3 and AVF  Concerning for inferior MI    changed compared to prior on 2013    Interpreted Contemporaneously by me.  Independently viewed by me      PROGRESS AND CONSULTS  ED Course   0020  Ordered labs and CT abd/pel for further evaluation.   0114  Pt's troponin is elevate; ordered EKG.   0141  Rechecked pt, who is resting comfortably, and in no acute distress. Pt denies CP currently. Dicussed pt's blood sugar; pt states that she has taken her diabetes medication, and has not eaten recently. Also discussed pt's CT findings of  sub-acute rib fracture as well as her EKG findings and elevated troponin. Plan to consult cardiology and admit the pt. Pt understands and agrees with the plan, and all questions were answered.   0147  Ordered ASA and NTG due to findings of an MI, and consult to cardiology.   0148  Rechecked pt; pt's daughter is now in the pt's room. Discussed the pt's findings of rib fracture, and lab and EKG findings of a MI. Plan to admit the pt. Pt's daughter understands and agrees with the plan, and all questions were answered.   0227  Received a call from Dr. Crook and discussed pt's case. Dr. Crook is aware of the pt's markedly normal EKG and troponin, and asked that the pt be admitted to the hospitalist. She agrees to consult on the pt.   0228  Ordered consult to [facility].   0238  Received a call from Dr. Bansal and discussed pt's case. Dr. Bansal agreed to admit the pt.      MEDICAL DECISION MAKING  Results were reviewed/discussed with the patient and they were also made aware of online access. Pt also made aware that some labs, such as cultures, will not be resulted during ER visit and follow up with PMD is necessary.     MDM  Number of Diagnoses or Management Options     Amount and/or Complexity of Data Reviewed  Clinical lab tests: reviewed and ordered  Tests in the radiology section of CPT®: reviewed and ordered (XR R ribs: no acute fracture. CT chest: nonacute healing R rib fracture)  Tests in the medicine section of CPT®: ordered and reviewed (See procedures section for EKG)  Discuss the patient with other providers: yes (Dr. Crook (cardiology), Dr. Bansal (A))    Patient Progress  Patient progress: stable         DIAGNOSIS  Final diagnoses:   Acute coronary syndrome   Hypoglycemia   Closed fracture of multiple ribs of right side, initial encounter       DISPOSITION  ADMISSION by Dr. Bansal    Discussed treatment plan and reason for admission with pt/family and admitting physician.  Pt/family voiced understanding of the  plan for admission for further testing/treatment as needed.     Latest Documented Vital Signs:  As of 3:01 AM  BP- 128/60 HR- 65 Temp- 98.2 °F (36.8 °C) (Oral) O2 sat- 97%    --  Documentation assistance provided by yoshi Grier for Dr. Amos.  Information recorded by the yoshi was done at my direction and has been verified and validated by me.       Sahil Grier  04/10/18 8550       Anurag Amos MD  04/10/18 6003

## 2018-04-10 NOTE — PROGRESS NOTES
Clinical Pharmacy Services: Medication History    Lisbet Wilks is a 84 y.o. female presenting to [facility] for Hypoglycemia [E16.2]  Acute coronary syndrome [I24.9]  Closed fracture of multiple ribs of right side, initial encounter [S22.41XA]    She  has a past medical history of Benign essential HTN; Diabetes; Fatty liver; GERD (gastroesophageal reflux disease) (4/10/2018); Sleep apnea; and Thyroid disorder.    Allergies as of 04/09/2018   • (No Known Allergies)       Medication information was obtained from: Patient's pharmacies  Pharmacy and Phone Number:   [facility] - [place], [place] - [address] [phone] [facility] [phone] FX  [facility] Mail [facility] [address] [phone]  - [phone] FX    Prior to Admission Medications     Prescriptions Last Dose Informant Patient Reported? Taking?    atenolol (TENORMIN) 25 MG tablet 4/9/2018 Pharmacy Yes Yes    Take 25 mg by mouth Daily.    glimepiride (AMARYL) 4 MG tablet 4/9/2018 Pharmacy Yes Yes    Take 4 mg by mouth 2 (Two) Times a Day.    levothyroxine sodium (TIROSINT) 100 MCG capsule 4/9/2018 Pharmacy Yes Yes    Take 100 mcg by mouth Daily.    omeprazole (priLOSEC) 20 MG capsule 4/9/2018 Pharmacy No Yes    Take 1 capsule by mouth 2 (Two) Times a Day.    Patient taking differently:  Take 20 mg by mouth Daily.    rosuvastatin (CRESTOR) 10 MG tablet  Pharmacy Yes Yes    Take 10 mg by mouth Daily.    vitamin D (ERGOCALCIFEROL) 18134 UNITS capsule capsule 4/7/2018 Pharmacy Yes Yes    Take 50,000 Units by mouth 1 (One) Time Per Week. Patient takes it on every saturday    zolpidem (AMBIEN) 10 MG tablet  Pharmacy Yes Yes    Take 10 mg by mouth At Night As Needed for Sleep.    pioglitazone (ACTOS) 15 MG tablet   Yes No    Take 15 mg by mouth Daily.            Medication notes: Based on the information obtained from patient's pharmacies, following changes have been made to patient's  PTA medication list.     1. Per [facility] pharmacy - pt takes Crestor 10 mg PO once daily  2. Per [facility] pharmacy - pt has recently filled Zolpidem tartrate 10 mg PO once at bedtime PRN for sleep  3. Per [facility] pharmacy - pt has not recently filled pioglitazone 15 mg once daily prescription (last filled - 06/2017 for 90 days supply). [facility] pharmacy has no record of this prescription. Patient reports getting medications filled at [facility] and [facility]. Pt is non-compliant    This medication list is complete to the best of my knowledge as of 4/10/2018    Please call pharmacy for any questions.     Michelle Shipley, PharmD, BCPS  04/10/18 2:31 PM

## 2018-04-10 NOTE — ED NOTES
PT A&O, per MD ok to give food and orange juice for hypoglycemia     Francisca London RN  04/10/18 0124

## 2018-04-10 NOTE — PLAN OF CARE
Problem: Patient Care Overview  Goal: Plan of Care Review  Outcome: Ongoing (interventions implemented as appropriate)   04/10/18 0521   Coping/Psychosocial   Plan of Care Reviewed With patient   Plan of Care Review   Progress improving   OTHER   Outcome Summary meds per order, no co pain, no falls, see labs and vs     Goal: Individualization and Mutuality  Outcome: Ongoing (interventions implemented as appropriate)    Goal: Discharge Needs Assessment  Outcome: Ongoing (interventions implemented as appropriate)    Goal: Interprofessional Rounds/Family Conf  Outcome: Ongoing (interventions implemented as appropriate)      Problem: Fall Risk (Adult)  Goal: Identify Related Risk Factors and Signs and Symptoms  Outcome: Outcome(s) achieved Date Met: 04/10/18    Goal: Absence of Fall  Outcome: Ongoing (interventions implemented as appropriate)      Problem: Pain, Acute (Adult)  Goal: Identify Related Risk Factors and Signs and Symptoms  Outcome: Outcome(s) achieved Date Met: 04/10/18    Goal: Acceptable Pain Control/Comfort Level  Outcome: Ongoing (interventions implemented as appropriate)      Problem: Diabetes, Type 2 (Adult)  Goal: Signs and Symptoms of Listed Potential Problems Will be Absent, Minimized or Managed (Diabetes, Type 2)  Outcome: Ongoing (interventions implemented as appropriate)      Problem: Cardiac: ACS (Acute Coronary Syndrome) (Adult)  Goal: Signs and Symptoms of Listed Potential Problems Will be Absent, Minimized or Managed (Cardiac: ACS)  Outcome: Ongoing (interventions implemented as appropriate)

## 2018-04-10 NOTE — CONSULTS
Date of Hospital Visit: 04/10/18  Encounter Provider: David Villalba MD  Place of Service: [facility] CARDIOLOGY  Patient Name: Lisbet Wilks  :1934  [id number]  Referral Provider: Ankush Bansal MD    Chief complaint: Chest Pain    History of Present Illness:    This is a 84 year old female with prior hx of smoking, a pack of day for over 20 years, DM, HTN, HLD, and GERD. She had a mechanical fall about 3 weeks ago when she was reaching for an item across the tub. Ever since then, she has been having intermittent rib pain and chest discomfort that begins midsternally,with a burning sensation that goes into her neck. She also felt intermittent palpitations and general fatigue. In there ER, imaging did confirm that she had a non acute, healing right sided rib fracture and she was found to have an abnormal EKG with a first troponin of 2.610 and a second troponin of 2.030.    This morning, she is chest pain free and states that she is a DNR. She denies having a prior cardiac hx or any testing done.      I have reviewed the above history of present illness and agree with it.  She complained of substernal burning on 3 days prior to admission but she did describe it as a misery.  She felt it was her esophageal reflux that she's had before.  But it continued to bother her and she came to the hospital yesterday.  She's not had any further symptoms since arriving to the hospital.  She is very clear that she does not want a lot of aggressive things done.  She's not had a history of heart disease she does have diabetes hyperlipidemia and hypertension    EKG pending. Echocardiogram pending.     CT abd/pelvis on 4/10/18-  IMPRESSION:  1.  Moderate complex pericardial effusion, new from previous, recommend  follow-up.  2. Uncomplicated cholelithiasis.  3. Mild nonspecific duodenitis.  4. Moderate diverticulosis.  5. Trace ascites.  6. Nonacute, healing right-sided rib fracture    XR of  chest and XR of ribs on 4/9/18-  FINDINGS: A total of 4 views of the right ribs were obtained. PA chest  x-ray included in this exam. There are 11 ribs with the right sixth rib  being partially bifid distally as normal variant. No displaced rib  fracture or other osseous abnormality of the right ribs is demonstrated.  On the PA chest radiograph, lungs well inflated and clear. No  pneumothorax.     IMPRESSION:  No evidence of right rib fracture appreciated      Past Medical History:   Diagnosis Date   • Benign essential HTN    • Diabetes    • Fatty liver    • GERD (gastroesophageal reflux disease) 4/10/2018   • Sleep apnea    • Thyroid disorder        Past Surgical History:   Procedure Laterality Date   • APPENDECTOMY     • CATARACT EXTRACTION     • COLONOSCOPY  07/15/2014    NBIH, tics,    • ENDOSCOPY  07/15/2014    acute gastritis   • HYSTERECTOMY     • THYROID BIOPSY     • TONSILLECTOMY         Prescriptions Prior to Admission   Medication Sig Dispense Refill Last Dose   • atenolol (TENORMIN) 25 MG tablet Take 25 mg by mouth Daily.   4/9/2018 at 0900   • glimepiride (AMARYL) 4 MG tablet Take 4 mg by mouth 2 (Two) Times a Day.   4/9/2018 at 0900   • levothyroxine sodium (TIROSINT) 100 MCG capsule Take 100 mcg by mouth Daily.   4/9/2018 at 0900   • omeprazole (priLOSEC) 20 MG capsule Take 1 capsule by mouth 2 (Two) Times a Day. (Patient taking differently: Take 20 mg by mouth Daily.) 180 capsule 3 4/9/2018 at 0900   • pioglitazone (ACTOS) 15 MG tablet Take 15 mg by mouth Daily.   4/9/2018 at 0900   • vitamin D (ERGOCALCIFEROL) 18993 UNITS capsule capsule Take 50,000 Units by mouth 1 (One) Time Per Week.   4/7/2018 at 0900       Current Meds  Scheduled Meds:    aspirin 81 mg Oral Daily   atenolol 25 mg Oral Daily   enoxaparin 40 mg Subcutaneous Q24H   levothyroxine 100 mcg Oral Q AM   pantoprazole 40 mg Oral Q AM     Continuous Infusions:   PRN Meds:.•  acetaminophen  •  Morphine **AND** naloxone  •  nitroglycerin  •   ondansetron **OR** ondansetron ODT **OR** ondansetron  •  sodium chloride  •  Insert peripheral IV **AND** sodium chloride    Allergies as of 04/09/2018   • (No Known Allergies)       Social History     Social History   • Marital status:      Spouse name: N/A   • Number of children: N/A   • Years of education: N/A     Occupational History   • Not on file.     Social History Main Topics   • Smoking status: Former Smoker     Packs/day: 1.00     Years: 15.00   • Smokeless tobacco: Former User      Comment: quit 1980   • Alcohol use No   • Drug use: No   • Sexual activity: Not on file     Other Topics Concern   • Not on file     Social History Narrative   • No narrative on file       Family History   Problem Relation Age of Onset   • Colon cancer Maternal Uncle    • Colon cancer Maternal Uncle        REVIEW OF SYSTEMS:   ROS was performed and is negative except as outlined in HPI     REVIEW OF SYSTEMS:   CONSTITUTIONAL: No weight loss, fever, chills, weakness or fatigue.   HEENT: Eyes: No visual loss, blurred vision, double vision or yellow sclerae. Ears, Nose, Throat: No hearing loss, sneezing, congestion, runny nose or sore throat.   SKIN: No rash or itching.     RESPIRATORY: No shortness of breath, hemoptysis, cough or sputum.   GASTROINTESTINAL: No anorexia, nausea, vomiting or diarrhea. No abdominal pain, bright red blood per rectum or melena.  GENITOURINARY: No burning on urination, hematuria or increased frequency.  NEUROLOGICAL: No headache, dizziness, syncope, paralysis, ataxia, numbness or tingling in the extremities. No change in bowel or bladder control.   MUSCULOSKELETAL: No muscle, back pain, joint pain or stiffness.   HEMATOLOGIC: No anemia, bleeding or bruising.   LYMPHATICS: No enlarged nodes. No history of splenectomy.   PSYCHIATRIC: No history of depression, anxiety, hallucinations.   ENDOCRINOLOGIC: No reports of sweating, cold or heat intolerance. No polyuria or polydipsia.       Objective:  "  Temp:  [97.7 °F (36.5 °C)-98.2 °F (36.8 °C)] 97.7 °F (36.5 °C)  Heart Rate:  [62-72] 62  Resp:  [16-18] 16  BP: (118-161)/() 120/64  Body mass index is 25.41 kg/m².  Flowsheet Rows    Flowsheet Row First Filed Value   Admission Height 157.5 cm (62\") Documented at 04/09/2018 2009   Admission Weight 63.5 kg (140 lb) Documented at 04/09/2018 2031        Vitals:    04/10/18 0733   BP: 120/64   Pulse:    Resp: 16   Temp: 97.7 °F (36.5 °C)   SpO2:        Head:    Normocephalic, without obvious abnormality, atraumatic   Eyes:            Lids and lashes normal, conjunctivae and sclerae normal, no   icterus, no pallor   Ears:    Ears appear intact with no abnormalities noted   Throat:   No oral lesions, dentition good   Neck:   No adenopathy, supple, trachea midline, no thyromegaly, no   carotid bruit, no JVD   Lungs:     Breath sounds are equal and clear to auscultation    Heart:    Normal S1 and S2, RRR, No M/G/R   Abdomen:     Normal bowel sounds, no masses, no organomegaly, soft        non-tender, non-distended, no guarding   Extremities:   Moves all extremities well, no edema, no cyanosis, no redness   Pulses:   Pulses palpable and equal bilaterally.    Skin:  Psychiatric:   No bleeding, bruising or rash    Awake, alert and oriented x 3, normal mood and affect           EKG on 4/10/18 @ 0133-    I personally viewed and interpreted the patient's EKG/Telemetry data    Assessment:  Active Hospital Problems (** Indicates Principal Problem)    Diagnosis Date Noted   • **NSTEMI (non-ST elevated myocardial infarction) [I21.4] 04/10/2018   • Type 2 diabetes mellitus with hypoglycemia [E11.649] 04/10/2018   • Pericardial effusion [I31.3] 04/10/2018   • GERD (gastroesophageal reflux disease) [K21.9] 04/10/2018   • Rib fracture [S22.39XA] 04/10/2018   • DNR (do not resuscitate) [Z66] 04/10/2018      Resolved Hospital Problems    Diagnosis Date Noted Date Resolved   No resolved problems to display.       Plan:Well I think " her pain was from an inferior apical myocardial infarction that she rode out at home.  Her ECG today has some persistent inferior ST elevation but there is terminal T-wave inversion troponins already elevated she's not having symptoms now.  There is really no data at this point now that she is more than 24 hours out and asymptomatic that percussion or to the Cath Lab would help and she doesn't want to do that anyway.  She is on a beta blocker she's on an aspirin I don't think there is any data that taking Plavix at this point would help in addition.  I will put her on a statin medication but again day then people over age 75 that it helps is sketchy.  We will get an echo test on her    David Villalba MD  04/10/18  10:37 AM.

## 2018-04-11 LAB
GLUCOSE BLDC GLUCOMTR-MCNC: 146 MG/DL (ref 70–130)
GLUCOSE BLDC GLUCOMTR-MCNC: 165 MG/DL (ref 70–130)
GLUCOSE BLDC GLUCOMTR-MCNC: 178 MG/DL (ref 70–130)
GLUCOSE BLDC GLUCOMTR-MCNC: 194 MG/DL (ref 70–130)

## 2018-04-11 PROCEDURE — 82962 GLUCOSE BLOOD TEST: CPT

## 2018-04-11 PROCEDURE — 63710000001 INSULIN ASPART PER 5 UNITS: Performed by: HOSPITALIST

## 2018-04-11 PROCEDURE — 99233 SBSQ HOSP IP/OBS HIGH 50: CPT | Performed by: INTERNAL MEDICINE

## 2018-04-11 PROCEDURE — 25010000002 ENOXAPARIN PER 10 MG: Performed by: INTERNAL MEDICINE

## 2018-04-11 RX ORDER — DEXTROSE MONOHYDRATE 25 G/50ML
25 INJECTION, SOLUTION INTRAVENOUS
Status: DISCONTINUED | OUTPATIENT
Start: 2018-04-11 | End: 2018-04-12 | Stop reason: HOSPADM

## 2018-04-11 RX ORDER — NICOTINE POLACRILEX 4 MG
15 LOZENGE BUCCAL
Status: DISCONTINUED | OUTPATIENT
Start: 2018-04-11 | End: 2018-04-12 | Stop reason: HOSPADM

## 2018-04-11 RX ADMIN — INSULIN ASPART 2 UNITS: 100 INJECTION, SOLUTION INTRAVENOUS; SUBCUTANEOUS at 17:21

## 2018-04-11 RX ADMIN — LEVOTHYROXINE SODIUM 100 MCG: 100 TABLET ORAL at 06:45

## 2018-04-11 RX ADMIN — ATENOLOL 25 MG: 25 TABLET ORAL at 08:45

## 2018-04-11 RX ADMIN — ASPIRIN 81 MG: 81 TABLET ORAL at 08:45

## 2018-04-11 RX ADMIN — ATORVASTATIN CALCIUM 20 MG: 20 TABLET, FILM COATED ORAL at 08:45

## 2018-04-11 RX ADMIN — PANTOPRAZOLE SODIUM 40 MG: 40 TABLET, DELAYED RELEASE ORAL at 06:45

## 2018-04-11 RX ADMIN — ENOXAPARIN SODIUM 40 MG: 40 INJECTION SUBCUTANEOUS at 08:45

## 2018-04-11 RX ADMIN — INSULIN ASPART 2 UNITS: 100 INJECTION, SOLUTION INTRAVENOUS; SUBCUTANEOUS at 12:00

## 2018-04-11 NOTE — PROGRESS NOTES
[facility]     LOS: 1 day     Subjective:  Sore in chest from echo  Eating ok  No N/V    No cp  No soa    Objective:    Vital Signs:  Temp:  [97.7 °F (36.5 °C)-98.2 °F (36.8 °C)] 98.2 °F (36.8 °C)  Heart Rate:  [60-62] 60  Resp:  [16] 16  BP: (103-150)/(54-68) 150/65    SpO2:  [94 %-97 %] 97 %  on   ;   Device (Oxygen Therapy): room air  Body mass index is 25.41 kg/m².    Physical Exam   Constitutional: She appears well-developed and well-nourished.   HENT:   Head: Normocephalic and atraumatic.   Cardiovascular: Normal rate and regular rhythm.    No murmur heard.  Pulmonary/Chest: Effort normal and breath sounds normal.   Abdominal: Soft. Bowel sounds are normal. She exhibits no distension. There is no tenderness.   Neurological: She is alert.   Skin: Skin is warm and dry.       Results Review:    Glucose   Date Value Ref Range Status   04/10/2018 207 (H) 65 - 99 mg/dL Final   04/10/2018 40 (C) 65 - 99 mg/dL Final       Results from last 7 days  Lab Units 04/10/18  0029   WBC 10*3/mm3 11.12*   HEMOGLOBIN g/dL 11.7*   HEMATOCRIT % 38.2   PLATELETS 10*3/mm3 234       Results from last 7 days  Lab Units 04/10/18  0618 04/10/18  0029   SODIUM mmol/L 135* 141   POTASSIUM mmol/L 4.5 4.4   CHLORIDE mmol/L 99 103   CO2 mmol/L 24.2 22.1   BUN mg/dL 25* 26*   CREATININE mg/dL 0.83 0.96   CALCIUM mg/dL 9.0 8.9   BILIRUBIN mg/dL  --  0.9   ALK PHOS U/L  --  78   ALT (SGPT) U/L  --  30   AST (SGOT) U/L  --  24   GLUCOSE mg/dL 207* 40*               Results from last 7 days  Lab Units 04/10/18  0618 04/10/18  0029   TROPONIN T ng/mL 2.030* 2.610*     Cultures:       I have reviewed daily medications and changes in CPOE    Scheduled meds    aspirin 81 mg Oral Daily   atenolol 25 mg Oral Daily   atorvastatin 20 mg Oral Daily   enoxaparin 40 mg Subcutaneous Q24H   levothyroxine 100 mcg Oral Q AM   pantoprazole 40 mg Oral Q AM          PRN meds  •  acetaminophen  •  Morphine **AND** naloxone  •   nitroglycerin  •  ondansetron **OR** ondansetron ODT **OR** ondansetron  •  sodium chloride  •  Insert peripheral IV **AND** sodium chloride    Echo reviewed      Principal Problem:    NSTEMI (non-ST elevated myocardial infarction)  Active Problems:    Type 2 diabetes mellitus with hypoglycemia    Pericardial effusion    GERD (gastroesophageal reflux disease)    Rib fracture    DNR (do not resuscitate)        Assessment/Plan:      NSTEMI (non-ST elevated myocardial infarction)  -echo show wall motion abnormalities  -atenolol 25mg po qday  -lipitor 20  -lovenox 40 qday  -aspirin      Type 2 diabetes mellitus with hypoglycemia  -bs 160 and 260  -amaryl and actos at home  -start ssi     complex Pericardial effusion      GERD (gastroesophageal reflux disease)  -protonix po      Rib fracture  -3 weeks ago fell in the bath tube    Gallstones-no symptoms, incidentally noted on CT    Duodenitis- incidentally noted on CT    Trace ascites-incidentally noted on CT    Abnormal urine but no symptoms- no treatment      DNR (do not resuscitate)-she confirms this again 4/11    >35 minutes spent, > 1/2 time spent counseling and coordination of care discussed    D/w cardiology x 2 yesterday    Willam Broderick MD  04/11/18  11:22 AM

## 2018-04-11 NOTE — PROGRESS NOTES
Discharge Planning Assessment  [facility]     Patient Name: Lisbet Wilks  MRN: [id number]  Today's Date: 4/11/2018    Admit Date: 4/9/2018          Discharge Needs Assessment     Row Name 04/11/18 1249       Living Environment    Lives With child(roxi), adult    Name(s) of Who Lives With Patient Julia Wilks     Current Living Arrangements home/apartment/condo    Potentially Unsafe Housing Conditions other (see comments)   No concerns     Primary Care Provided by self    Provides Primary Care For no one    Family Caregiver if Needed child(roxi), adult    Family Caregiver Names Julia Wilks     Quality of Family Relationships helpful;involved;supportive    Able to Return to Prior Arrangements yes       Resource/Environmental Concerns    Resource/Environmental Concerns none    Transportation Concerns car, none       Transition Planning    Patient/Family Anticipates Transition to home with family    Patient/Family Anticipated Services at Transition none    Transportation Anticipated family or friend will provide       Discharge Needs Assessment    Readmission Within the Last 30 Days no previous admission in last 30 days    Concerns to be Addressed no discharge needs identified;denies needs/concerns at this time    Equipment Currently Used at Home none    Anticipated Changes Related to Illness none            Discharge Plan     Row Name 04/11/18 1252       Plan    Plan Home; follow for PT evals     Patient/Family in Agreement with Plan yes    Plan Comments CCP met with patient at bedside. CCP role explained and discharge planning discussed. Face sheet verified and IMM noted. Patient does not have a living will or POA. Patient's emergency contact is Julia Wilks [phone]. Patient's PCP is Dr. Lopes. Patient lives with her daughter in a house with two steps to the entrance and steps to the basement with handrail. Patient's bedroom and bathroom are on the main level. Patient does not use any medical equipment  and is independent with her ADLS. Patient uses [facility] on boarding pass; patient denies having trouble affording her medications and does not have trouble remembering to take her medications. Patient has no history of home health or SNF. CCP will follow for PT sulma. Shraddha Campbell CSW          Destination     No service coordination in this encounter.      Durable Medical Equipment     No service coordination in this encounter.      Dialysis/Infusion     No service coordination in this encounter.      Home Medical Care     No service coordination in this encounter.      Social Care     No service coordination in this encounter.                Demographic Summary     Row Name 04/11/18 1249       General Information    Admission Type inpatient    Arrived From home    Required Notices Provided Important Message from Medicare    Referral Source admission list    Reason for Consult discharge planning    Preferred Language English     Used During This Interaction no            Functional Status     Row Name 04/11/18 1249       Functional Status    Usual Activity Tolerance good    Current Activity Tolerance moderate       Functional Status, IADL    Medications independent    Meal Preparation independent    Housekeeping independent    Laundry independent    Shopping independent       Mental Status    General Appearance WDL WDL       Mental Status Summary    Recent Changes in Mental Status/Cognitive Functioning no changes            Psychosocial    No documentation.           Abuse/Neglect    No documentation.           Legal    No documentation.           Substance Abuse    No documentation.           Patient Forms    No documentation.         Teri Campbell, [place]REE

## 2018-04-11 NOTE — PLAN OF CARE
Problem: Patient Care Overview  Goal: Plan of Care Review  Outcome: Ongoing (interventions implemented as appropriate)   04/11/18 0500   Coping/Psychosocial   Plan of Care Reviewed With patient   Plan of Care Review   Progress improving   OTHER   Outcome Summary meds per order, no co pain, no co chest pin, no falls, see vs and labs     Goal: Individualization and Mutuality  Outcome: Ongoing (interventions implemented as appropriate)    Goal: Discharge Needs Assessment  Outcome: Ongoing (interventions implemented as appropriate)    Goal: Interprofessional Rounds/Family Conf  Outcome: Ongoing (interventions implemented as appropriate)      Problem: Fall Risk (Adult)  Goal: Absence of Fall  Outcome: Ongoing (interventions implemented as appropriate)      Problem: Diabetes, Type 2 (Adult)  Goal: Signs and Symptoms of Listed Potential Problems Will be Absent, Minimized or Managed (Diabetes, Type 2)  Outcome: Ongoing (interventions implemented as appropriate)      Problem: Cardiac: ACS (Acute Coronary Syndrome) (Adult)  Goal: Signs and Symptoms of Listed Potential Problems Will be Absent, Minimized or Managed (Cardiac: ACS)  Outcome: Ongoing (interventions implemented as appropriate)

## 2018-04-11 NOTE — PROGRESS NOTES
"Lisbet J Sheets  1934 84 y.o.  [id number]      Patient Care Team:  Aleah Lopes MD as PCP - General  Aleah Lopes MD as PCP - Family Medicine    CC: Late presentation of an inferior apical STEMI, normal LV function    Interval History: No chest discomfort or shortness of breath      Objective   Vital Signs  Temp:  [97.7 °F (36.5 °C)-98.5 °F (36.9 °C)] 98.5 °F (36.9 °C)  Heart Rate:  [60-65] 65  Resp:  [16] 16  BP: (103-150)/(54-88) 125/88    Intake/Output Summary (Last 24 hours) at 04/11/18 1318  Last data filed at 04/11/18 0900   Gross per 24 hour   Intake              450 ml   Output             2500 ml   Net            -2050 ml     Flowsheet Rows    Flowsheet Row First Filed Value   Admission Height 157.5 cm (62\") Documented at 04/09/2018 2009   Admission Weight 63.5 kg (140 lb) Documented at 04/09/2018 2031          Physical Exam:   General Appearance:    Alert,oriented, in no acute distress   Lungs:     Clear to auscultation,BS are equal    Heart:    Normal S1 and S2, RRR without murmur, gallop or rub   HEENT:    Sclera are clear, no JVD or adenopathy   Abdomen:     Normal bowel sounds, soft non-tender, non-distended, no HSM   Extremities:   Moves all extremities well, no edema, no cyanosis, no             Redness, no rash     Medication Review:        aspirin 81 mg Oral Daily   atenolol 25 mg Oral Daily   atorvastatin 20 mg Oral Daily   enoxaparin 40 mg Subcutaneous Q24H   insulin aspart 0-7 Units Subcutaneous 4x Daily With Meals & Nightly   levothyroxine 100 mcg Oral Q AM   pantoprazole 40 mg Oral Q AM            I reviewed the patient's new clinical results.  I personally viewed and interpreted the patient's EKG/Telemetry data    Assessment/Plan  Active Hospital Problems (** Indicates Principal Problem)    Diagnosis Date Noted   • **NSTEMI (non-ST elevated myocardial infarction) [I21.4] 04/10/2018   • Type 2 diabetes mellitus with hypoglycemia [E11.649] 04/10/2018   • Pericardial effusion [I31.3] " 04/10/2018   • GERD (gastroesophageal reflux disease) [K21.9] 04/10/2018   • Rib fracture [S22.39XA] 04/10/2018   • DNR (do not resuscitate) [Z66] 04/10/2018      Resolved Hospital Problems    Diagnosis Date Noted Date Resolved   No resolved problems to display.       This is an elderly woman who presented late after an inferior apical STEMI.  Yesterday she voiced not wanting having aggressive therapy.  Which is not unreasonable at her age.  We did an echo which shows normal global function although an area of inferior akinesis.  She has not had any arrhythmia and she has not had any heart failure symptoms.  I am comfortable managing her medically and seen how she does and taking a conservative approach she is on aspirin atenolol and a statin.  I do not think her blood pressure will support an Ace inhibitor at this point.     If she were to have recurrent angina we would have to reconsider and possible catheter.  A second alternative approach would be to do a stress test and see if there is a large area of myocardium at risk then we can consider a catheter but her wishes yesterday were 2 be conservative and I think that's a good approach in somebody her age    David Villalba MD  04/11/18  1:18 PM

## 2018-04-11 NOTE — PLAN OF CARE
Problem: Patient Care Overview  Goal: Plan of Care Review  Outcome: Ongoing (interventions implemented as appropriate)   04/11/18 2047   Coping/Psychosocial   Plan of Care Reviewed With patient   Plan of Care Review   Progress improving   OTHER   Outcome Summary VSS. pt. denies pain, no S/S of disress or discomfort. will continue to monitor      Goal: Individualization and Mutuality  Outcome: Ongoing (interventions implemented as appropriate)    Goal: Discharge Needs Assessment  Outcome: Ongoing (interventions implemented as appropriate)      Problem: Fall Risk (Adult)  Goal: Absence of Fall  Outcome: Ongoing (interventions implemented as appropriate)      Problem: Pain, Acute (Adult)  Goal: Acceptable Pain Control/Comfort Level  Outcome: Ongoing (interventions implemented as appropriate)      Problem: Diabetes, Type 2 (Adult)  Goal: Signs and Symptoms of Listed Potential Problems Will be Absent, Minimized or Managed (Diabetes, Type 2)  Outcome: Ongoing (interventions implemented as appropriate)      Problem: Cardiac: ACS (Acute Coronary Syndrome) (Adult)  Goal: Signs and Symptoms of Listed Potential Problems Will be Absent, Minimized or Managed (Cardiac: ACS)  Outcome: Ongoing (interventions implemented as appropriate)

## 2018-04-12 ENCOUNTER — APPOINTMENT (OUTPATIENT)
Dept: NUCLEAR MEDICINE | Facility: HOSPITAL | Age: 83
End: 2018-04-12

## 2018-04-12 VITALS
WEIGHT: 138.9 LBS | HEART RATE: 63 BPM | SYSTOLIC BLOOD PRESSURE: 134 MMHG | DIASTOLIC BLOOD PRESSURE: 64 MMHG | RESPIRATION RATE: 18 BRPM | HEIGHT: 62 IN | BODY MASS INDEX: 25.56 KG/M2 | TEMPERATURE: 98.2 F | OXYGEN SATURATION: 94 %

## 2018-04-12 LAB
BH CV STRESS COMMENTS STAGE 1: NORMAL
BH CV STRESS DOSE REGADENOSON STAGE 1: 0.4
BH CV STRESS DURATION MIN STAGE 1: 0
BH CV STRESS DURATION SEC STAGE 1: 10
BH CV STRESS PROTOCOL 1: NORMAL
BH CV STRESS RECOVERY BP: NORMAL MMHG
BH CV STRESS RECOVERY HR: 75 BPM
BH CV STRESS STAGE 1: 1
GLUCOSE BLDC GLUCOMTR-MCNC: 139 MG/DL (ref 70–130)
GLUCOSE BLDC GLUCOMTR-MCNC: 245 MG/DL (ref 70–130)
LV EF NUC BP: 38 %
MAXIMAL PREDICTED HEART RATE: 136 BPM
PERCENT MAX PREDICTED HR: 61.03 %
STRESS BASELINE BP: NORMAL MMHG
STRESS BASELINE HR: 62 BPM
STRESS PERCENT HR: 72 %
STRESS POST PEAK BP: NORMAL MMHG
STRESS POST PEAK HR: 83 BPM
STRESS TARGET HR: 116 BPM

## 2018-04-12 PROCEDURE — 0 TECHNETIUM SESTAMIBI: Performed by: INTERNAL MEDICINE

## 2018-04-12 PROCEDURE — 25010000002 ENOXAPARIN PER 10 MG: Performed by: INTERNAL MEDICINE

## 2018-04-12 PROCEDURE — 99232 SBSQ HOSP IP/OBS MODERATE 35: CPT | Performed by: INTERNAL MEDICINE

## 2018-04-12 PROCEDURE — 78452 HT MUSCLE IMAGE SPECT MULT: CPT | Performed by: INTERNAL MEDICINE

## 2018-04-12 PROCEDURE — 25010000002 REGADENOSON 0.4 MG/5ML SOLUTION: Performed by: INTERNAL MEDICINE

## 2018-04-12 PROCEDURE — A9500 TC99M SESTAMIBI: HCPCS | Performed by: INTERNAL MEDICINE

## 2018-04-12 PROCEDURE — 78452 HT MUSCLE IMAGE SPECT MULT: CPT

## 2018-04-12 PROCEDURE — 93017 CV STRESS TEST TRACING ONLY: CPT

## 2018-04-12 PROCEDURE — 63710000001 INSULIN ASPART PER 5 UNITS: Performed by: HOSPITALIST

## 2018-04-12 PROCEDURE — 93018 CV STRESS TEST I&R ONLY: CPT | Performed by: INTERNAL MEDICINE

## 2018-04-12 PROCEDURE — 82962 GLUCOSE BLOOD TEST: CPT

## 2018-04-12 PROCEDURE — 93016 CV STRESS TEST SUPVJ ONLY: CPT | Performed by: INTERNAL MEDICINE

## 2018-04-12 RX ORDER — ASPIRIN 81 MG/1
81 TABLET ORAL DAILY
Qty: 30 TABLET
Start: 2018-04-13 | End: 2018-05-13

## 2018-04-12 RX ADMIN — ENOXAPARIN SODIUM 40 MG: 40 INJECTION SUBCUTANEOUS at 10:22

## 2018-04-12 RX ADMIN — TECHNETIUM TC 99M SESTAMIBI 1 DOSE: 1 INJECTION INTRAVENOUS at 06:50

## 2018-04-12 RX ADMIN — ATENOLOL 25 MG: 25 TABLET ORAL at 10:21

## 2018-04-12 RX ADMIN — INSULIN ASPART 3 UNITS: 100 INJECTION, SOLUTION INTRAVENOUS; SUBCUTANEOUS at 13:00

## 2018-04-12 RX ADMIN — LEVOTHYROXINE SODIUM 100 MCG: 100 TABLET ORAL at 10:20

## 2018-04-12 RX ADMIN — REGADENOSON 0.4 MG: 0.08 INJECTION, SOLUTION INTRAVENOUS at 08:15

## 2018-04-12 RX ADMIN — ASPIRIN 81 MG: 81 TABLET ORAL at 10:21

## 2018-04-12 RX ADMIN — TECHNETIUM TC 99M SESTAMIBI 1 DOSE: 1 INJECTION INTRAVENOUS at 08:15

## 2018-04-12 RX ADMIN — ATORVASTATIN CALCIUM 20 MG: 20 TABLET, FILM COATED ORAL at 10:20

## 2018-04-12 NOTE — PLAN OF CARE
Problem: Fall Risk (Adult)  Goal: Absence of Fall  Outcome: Ongoing (interventions implemented as appropriate)   04/12/18 0206   Fall Risk (Adult)   Absence of Fall making progress toward outcome       Problem: Pain, Acute (Adult)  Goal: Acceptable Pain Control/Comfort Level  Outcome: Ongoing (interventions implemented as appropriate)   04/12/18 0206   Pain, Acute (Adult)   Acceptable Pain Control/Comfort Level making progress toward outcome       Problem: Diabetes, Type 2 (Adult)  Goal: Signs and Symptoms of Listed Potential Problems Will be Absent, Minimized or Managed (Diabetes, Type 2)  Outcome: Ongoing (interventions implemented as appropriate)   04/11/18 1332   Goal/Outcome Evaluation   Problems Assessed (Type 2 Diabetes) all   Problems Present (Type 2 Diabetes) none       Problem: Cardiac: ACS (Acute Coronary Syndrome) (Adult)  Goal: Signs and Symptoms of Listed Potential Problems Will be Absent, Minimized or Managed (Cardiac: ACS)  Outcome: Ongoing (interventions implemented as appropriate)   04/11/18 1332   Goal/Outcome Evaluation   Problems Assessed (Acute Coronary Syndrome) all   Problems Present (Acute Coronary Syn) none

## 2018-04-12 NOTE — PROGRESS NOTES
Continued Stay Note  [facility]     Patient Name: Lisbet Wilks  MRN: [id number]  Today's Date: 4/12/2018    Admit Date: 4/9/2018          Discharge Plan     Row Name 04/12/18 1337       Plan    Plan Home    Patient/Family in Agreement with Plan yes    Plan Comments Discharge orders noted.  Met with pt who confirms plan to return home with daughter. No known  needs at this time. To start outpatient cardiac rehab in 2 weeks.   ARMAND Elizalde RN               Discharge Codes    No documentation.       Expected Discharge Date and Time     Expected Discharge Date Expected Discharge Time    Apr 12, 2018             oCra Elizalde

## 2018-04-12 NOTE — PROGRESS NOTES
[facility]     LOS: 2 days     Subjective:  Sore in chest from echo  Eating ok  No N/V    No cp  No soa    Objective:    Vital Signs:  Temp:  [98 °F (36.7 °C)-98.8 °F (37.1 °C)] 98.2 °F (36.8 °C)  Heart Rate:  [60-63] 63  Resp:  [18] 18  BP: (127-148)/(59-74) 134/64    SpO2:  [94 %-97 %] 94 %  on   ;   Device (Oxygen Therapy): room air  Body mass index is 25.41 kg/m².    Physical Exam   Constitutional: She appears well-developed and well-nourished.   HENT:   Head: Normocephalic and atraumatic.   Cardiovascular: Normal rate and regular rhythm.    No murmur heard.  Pulmonary/Chest: Effort normal and breath sounds normal.   Abdominal: Soft. Bowel sounds are normal. She exhibits no distension. There is no tenderness.   Neurological: She is alert.   Skin: Skin is warm and dry.       Results Review:    Glucose   Date Value Ref Range Status   04/10/2018 207 (H) 65 - 99 mg/dL Final   04/10/2018 40 (C) 65 - 99 mg/dL Final       Results from last 7 days  Lab Units 04/10/18  0029   WBC 10*3/mm3 11.12*   HEMOGLOBIN g/dL 11.7*   HEMATOCRIT % 38.2   PLATELETS 10*3/mm3 234       Results from last 7 days  Lab Units 04/10/18  0618 04/10/18  0029   SODIUM mmol/L 135* 141   POTASSIUM mmol/L 4.5 4.4   CHLORIDE mmol/L 99 103   CO2 mmol/L 24.2 22.1   BUN mg/dL 25* 26*   CREATININE mg/dL 0.83 0.96   CALCIUM mg/dL 9.0 8.9   BILIRUBIN mg/dL  --  0.9   ALK PHOS U/L  --  78   ALT (SGPT) U/L  --  30   AST (SGOT) U/L  --  24   GLUCOSE mg/dL 207* 40*               Results from last 7 days  Lab Units 04/10/18  0618 04/10/18  0029   TROPONIN T ng/mL 2.030* 2.610*     Cultures:       I have reviewed daily medications and changes in CPOE    Scheduled meds    aspirin 81 mg Oral Daily   atenolol 25 mg Oral Daily   atorvastatin 20 mg Oral Daily   enoxaparin 40 mg Subcutaneous Q24H   insulin aspart 0-7 Units Subcutaneous 4x Daily With Meals & Nightly   levothyroxine 100 mcg Oral Q AM   pantoprazole 40 mg Oral Q AM           PRN meds  •  acetaminophen  •  dextrose  •  dextrose  •  glucagon (human recombinant)  •  Morphine **AND** naloxone  •  nitroglycerin  •  ondansetron **OR** ondansetron ODT **OR** ondansetron  •  sodium chloride  •  Insert peripheral IV **AND** sodium chloride    Echo reviewed      Principal Problem:    NSTEMI (non-ST elevated myocardial infarction)  Active Problems:    Type 2 diabetes mellitus with hypoglycemia    Pericardial effusion    GERD (gastroesophageal reflux disease)    Rib fracture    DNR (do not resuscitate)        Assessment/Plan:      NSTEMI (non-ST elevated myocardial infarction)- pain on Saturday and came to ER Monday  -echo show wall motion abnormalities  -atenolol 25mg po qday  -lipitor 20  -lovenox 40 qday  -aspirin      Type 2 diabetes mellitus with hypoglycemia  -bs 160 and 260  -amaryl and actos at home  -start ssi     complex Pericardial effusion      GERD (gastroesophageal reflux disease)  -protonix po      Rib fracture  -3 weeks ago fell in the bath tube    Gallstones-no symptoms, incidentally noted on CT    Duodenitis- incidentally noted on CT    Trace ascites-incidentally noted on CT    Abnormal urine but no symptoms- no treatment      DNR (do not resuscitate)-she confirms this again 4/11    Stress test    D/w Dr Villalba 4/12    Willam Broderick MD  04/12/18  1:02 PM

## 2018-04-12 NOTE — DISCHARGE SUMMARY
"       Name: Lisbet Wilks  Age: 84 y.o.  Sex: female   :  1934  MRN: [id number]         Primary Care Physician: Aleah Lopes MD      Date of Admission:  2018  Date of Discharge:  2018      CHIEF COMPLAINT  Fall (fell 3 weeks ago and hit right side of  body on bathtub, was feeling better, over weekend started having pain in right breast and \"into esophagus\" has lost appetite. )      DISCHARGE DIAGNOSIS  Active Hospital Problems (** Indicates Principal Problem)    Diagnosis Date Noted   • **NSTEMI (non-ST elevated myocardial infarction) [I21.4] 04/10/2018   • Type 2 diabetes mellitus with hypoglycemia [E11.649] 04/10/2018   • Pericardial effusion [I31.3] 04/10/2018   • GERD (gastroesophageal reflux disease) [K21.9] 04/10/2018   • Rib fracture [S22.39XA] 04/10/2018   • DNR (do not resuscitate) [Z66] 04/10/2018      Resolved Hospital Problems    Diagnosis Date Noted Date Resolved   No resolved problems to display.       SECONDARY DIAGNOSES  Past Medical History:   Diagnosis Date   • Benign essential HTN    • Diabetes    • Fatty liver    • GERD (gastroesophageal reflux disease) 4/10/2018   • Sleep apnea    • Thyroid disorder        CONSULTS   Consult Orders (all)     Start     Ordered    04/10/18 0417  Inpatient Cardiology Consult  Once     Specialty:  Cardiology  Provider:  Teri Crook MD    04/10/18 0418    04/10/18 0229  [facility] (on-call MD unless specified)  Once     Specialty:  Internal Medicine  Provider:  Ankush Bansal MD    04/10/18 0228    04/10/18 0148  [facility] (on-call MD unless specified)  Once     Specialty:  Cardiology  Provider:  Teri Crook MD    04/10/18 0147        Consulting Physician(s)     Provider Relationship Specialty    Teri Crook MD Consulting Physician Cardiology            PROCEDURES PERFORMED    Stress test-Interpretation Summary     · Left ventricular ejection fraction is moderately reduced (Calculated EF = 38%).  · Myocardial perfusion imaging indicates " a medium-sized infarct located in the inferior wall and lateral wall with no significant ischemia noted.        ECHO:  Interpretation Summary     · Left ventricular systolic function is normal. Calculated EF = 66.1%. Estimated EF was in agreement with the calculated EF. Estimated EF = 66%. Normal left ventricular cavity size noted. Left ventricular wall thickness is consistent with moderate concentric hypertrophy. Left ventricular diastolic dysfunction is noted (grade I a w/high LAP) consistent with impaired relaxation.  · The following segments are akinetic: basal inferior. The following segments are hypokinetic: basal inferoseptal, basal inferolateral, mid inferior and mid inferolateral. All other segments are normal.  · Moderate mitral valve regurgitation is present.  · Mild tricuspid valve regurgitation is present. Estimated right ventricular systolic pressure from tricuspid regurgitation is normal (<35 mmHg).  · There is a small (<1cm) pericardial effusion.         HOSPITAL COURSE  84-year-old female with a history of type 2 diabetes per, hypertension who presents with chest pain started 2-3 days prior to hospitalization.  In the emergency room she complained of intermittent burning chest pain of the esophagus.  By the time she was seen by my partner early in the morning on 4/10, pain had already resolved.  Was noted on myself patient had ST elevation in inferior lead and had troponin of 2 on admission.  Cardiology and contacted the emergency room and I did talk with them on the morning of 4/10.  The patient at that time and told them she did not want invasive testing such as catheterization.  Patient's pain had also resolved.  As mentioned the pain had been going on for at least a couple days.     It was decided to treat patient medically by cardiology.  Patient will continue with aspirin, Lipitor and atenolol.  Patient underwent a stress test which does not show any reversible areas of ischemia.    Patient  does have a history type 2 diabetes and her blood sugars on admission were low.  Patient takes Amaryl at home.  Asked her to reduce her dose of Amaryl half for the next couple days and then she can get back home dose.  Patient will follow her diabetes up with primary care doctor.  Reports that blood sugars at home and quite good.  Hemoglobin A1c follow-up primary care doctor.    Patient is already on Lipitor and will need to follow-up lipid panel with primary care.    Further hospital course by problem list:  NSTEMI (non-ST elevated myocardial infarction)- pain on Saturday and came to ER Monday  -echo show wall motion abnormalities  -atenolol 25mg po qday  -lipitor 20  -lovenox 40 qday  -aspirin       Type 2 diabetes mellitus with hypoglycemia  -bs 160 and 260  -amaryl at home  -start ssi      complex Pericardial effusion by CT but small on Echo       GERD (gastroesophageal reflux disease)  -protonix po       Rib fracture  -3 weeks ago fell in the bath tube     Gallstones-no symptoms, incidentally noted on CT     Duodenitis- incidentally noted on CT     Trace ascites-incidentally noted on CT     Abnormal urine but no symptoms- no treatment       DNR (do not resuscitate)-she confirms this again 4/11     Mild hyponatremia of 135 follow-up with primary care doctor    Mild leukocytosis of 11 to be followed up with primary care    Hypothyroidism on replacement.  Currently not complaining of any symptoms symptoms consistent with hypothyroidism patient will need to follow-up for routine thyroid testing as previously done with primary care doctor.    PHYSICAL EXAM  Temp:  [98 °F (36.7 °C)-98.8 °F (37.1 °C)] 98.2 °F (36.8 °C)  Heart Rate:  [60-63] 63  Resp:  [18] 18  BP: (127-148)/(59-74) 134/64  Body mass index is 25.41 kg/m².  Physical Exam   Constitutional: She appears well-developed and well-nourished.   HENT:   Head: Normocephalic and atraumatic.   Cardiovascular: Normal rate and regular rhythm.    No murmur  heard.  Pulmonary/Chest: Effort normal and breath sounds normal.   Abdominal: Soft. Bowel sounds are normal. She exhibits no distension. There is no tenderness.   Neurological: She is alert.   Skin: Skin is warm and dry.         CONDITION ON DISCHARGE  Stable.      DISCHARGE DISPOSITION   Home      ALLERGIES  No Known Allergies      DISCHARGE MEDICATIONS     Your medication list      START taking these medications      Instructions Last Dose Given Next Dose Due   aspirin 81 MG EC tablet  Start taking on:  4/13/2018      Take 1 tablet by mouth Daily for 30 days.          CHANGE how you take these medications      Instructions Last Dose Given Next Dose Due   omeprazole 20 MG capsule  Commonly known as:  priLOSEC  What changed:  when to take this      Take 1 capsule by mouth 2 (Two) Times a Day.          CONTINUE taking these medications      Instructions Last Dose Given Next Dose Due   AMBIEN 10 MG tablet  Generic drug:  zolpidem      Take 10 mg by mouth At Night As Needed for Sleep.       atenolol 25 MG tablet  Commonly known as:  TENORMIN      Take 25 mg by mouth Daily.       glimepiride 4 MG tablet  Commonly known as:  AMARYL      Take 4 mg by mouth 2 (Two) Times a Day.       levothyroxine sodium 100 MCG capsule  Commonly known as:  TIROSINT      Take 100 mcg by mouth Daily.       rosuvastatin 10 MG tablet  Commonly known as:  CRESTOR      Take 10 mg by mouth Daily.       vitamin D 63476 units capsule capsule  Commonly known as:  ERGOCALCIFEROL      Take 50,000 Units by mouth 1 (One) Time Per Week. Patient takes it on every saturday          STOP taking these medications    pioglitazone 15 MG tablet  Commonly known as:  ACTOS              Where to Get Your Medications      Information about where to get these medications is not yet available    Ask your nurse or doctor about these medications   aspirin 81 MG EC tablet        No future appointments.  Follow-up Information     David Villalba MD. Schedule an  appointment as soon as possible for a visit in 2 week(s).    Specialty:  Cardiology  Contact information:  [address] [facility] [address]  [place] [id number] [phone]             DEEPA Fritz. Schedule an appointment as soon as possible for a visit in 1 week(s).    Specialty:  Cardiology  Why:  To start cardiac rehab  Contact information:  [address] [facility] [address]  [place] [id number] [phone]             Aleah Lopes MD .    Specialty:  Family Medicine  Contact information:  [address] [place] [address] [address]  [place] [id number] [phone]               Follow-up with Dr. Lopes within 1 week   cbc and cmp with PMD 1 week (follow sodium of 135 and wbc of 11)    TEST  RESULTS PENDING AT DISCHARGE  None       CODE STATUS  Conditional Code        Willam Broderick MD  [facility]  04/12/18  1:19 PM      Time: greater than 30 minutes.

## 2018-04-12 NOTE — PROGRESS NOTES
"Lisbet J Sheets  1934 84 y.o.  [id number]      Patient Care Team:  Aleah Lopes MD as PCP - General  Aleah Lopes MD as PCP - Family Medicine    CC: Late presentation of an inferior apical STEMI, normal LV function    Interval History: Feeling well no chest pain      Objective   Vital Signs  Temp:  [98 °F (36.7 °C)-98.8 °F (37.1 °C)] 98.2 °F (36.8 °C)  Heart Rate:  [60-65] 63  Resp:  [16-18] 18  BP: (125-148)/(59-88) 134/64    Intake/Output Summary (Last 24 hours) at 04/12/18 1214  Last data filed at 04/11/18 1700   Gross per 24 hour   Intake              240 ml   Output                0 ml   Net              240 ml     Flowsheet Rows    Flowsheet Row First Filed Value   Admission Height 157.5 cm (62\") Documented at 04/09/2018 2009   Admission Weight 63.5 kg (140 lb) Documented at 04/09/2018 2031          Physical Exam:   General Appearance:    Alert,oriented, in no acute distress   Lungs:     Clear to auscultation,BS are equal    Heart:    Normal S1 and S2, RRR without murmur, gallop or rub   HEENT:    Sclera are clear, no JVD or adenopathy   Abdomen:     Normal bowel sounds, soft non-tender, non-distended, no HSM   Extremities:   Moves all extremities well, no edema, no cyanosis, no             Redness, no rash     Medication Review:        aspirin 81 mg Oral Daily   atenolol 25 mg Oral Daily   atorvastatin 20 mg Oral Daily   enoxaparin 40 mg Subcutaneous Q24H   insulin aspart 0-7 Units Subcutaneous 4x Daily With Meals & Nightly   levothyroxine 100 mcg Oral Q AM   pantoprazole 40 mg Oral Q AM            I reviewed the patient's new clinical results.  I personally viewed and interpreted the patient's EKG/Telemetry data    Assessment/Plan  Active Hospital Problems (** Indicates Principal Problem)    Diagnosis Date Noted   • **NSTEMI (non-ST elevated myocardial infarction) [I21.4] 04/10/2018   • Type 2 diabetes mellitus with hypoglycemia [E11.649] 04/10/2018   • Pericardial effusion [I31.3] 04/10/2018   • " GERD (gastroesophageal reflux disease) [K21.9] 04/10/2018   • Rib fracture [S22.39XA] 04/10/2018   • DNR (do not resuscitate) [Z66] 04/10/2018      Resolved Hospital Problems    Diagnosis Date Noted Date Resolved   No resolved problems to display.       This is an elderly woman who presented late after an inferior apical STEMI.  Yesterday she voiced not wanting having aggressive therapy.  Which is not unreasonable at her age.  We did an echo which shows normal global function although an area of inferior akinesis.      We did a stress test on her today which shows inferior infarction no other areas of ischemia I think medical therapy is the right when he go with her I'm not can a change anything will keep her on her atenolol her baby aspirin and her statin therapy I would like her to see Francia Vazquez in one week to start cardiac rehabilitation and start that in 2 weeks and come see me in a month it's okay for her to be discharged today    David Villalba MD  04/12/18  12:14 PM

## 2018-04-16 NOTE — PROGRESS NOTES
Case Management Discharge Note    Final Note: Pt discharged home, no known needs    Destination     No service coordination in this encounter.      Durable Medical Equipment     No service coordination in this encounter.      Dialysis/Infusion     No service coordination in this encounter.      Home Medical Care     No service coordination in this encounter.      Social Care     No service coordination in this encounter.        Other: Other (private auto)    Final Discharge Disposition Code: 01 - home or self-care

## 2018-04-26 ENCOUNTER — OFFICE VISIT (OUTPATIENT)
Dept: CARDIOLOGY | Facility: CLINIC | Age: 83
End: 2018-04-26

## 2018-04-26 VITALS
BODY MASS INDEX: 25.58 KG/M2 | HEIGHT: 62 IN | WEIGHT: 139 LBS | HEART RATE: 76 BPM | OXYGEN SATURATION: 99 % | SYSTOLIC BLOOD PRESSURE: 122 MMHG | DIASTOLIC BLOOD PRESSURE: 68 MMHG

## 2018-04-26 DIAGNOSIS — I21.4 NSTEMI (NON-ST ELEVATED MYOCARDIAL INFARCTION) (HCC): Primary | ICD-10-CM

## 2018-04-26 PROCEDURE — 99213 OFFICE O/P EST LOW 20 MIN: CPT | Performed by: PHYSICIAN ASSISTANT

## 2018-04-26 PROCEDURE — 93000 ELECTROCARDIOGRAM COMPLETE: CPT | Performed by: PHYSICIAN ASSISTANT

## 2018-04-26 NOTE — PROGRESS NOTES
Date of Office Visit: 2018  Encounter Provider: DEEPA Fritz  Place of Service: [facility] CARDIOLOGY  Patient Name: Lisbet Wilks  :1934    Chief Complaint   Patient presents with   • Chest Pain     1 week ED follow up   :     HPI: Lisbet Wilks is a 84 y.o. female , new to me, who presents today for follow-up.  Old records have been obtained and reviewed by me.  She is a patient with a past medical history of smoking, diabetes, hypertension, hyperlipidemia, and GERD.  She presented to the emergency room on 4/10/2018 with complaints of chest pain.  3 weeks prior to her emergency room presentation she fell while she was reaching for an item across the tub.  Chest x-ray did show a healing right-sided rib fracture.  As part of her workup, she was found to have an abnormal ECG and an elevated first troponin of 2.6.  Her second troponin was 2.03.  She was seen by Dr. Villalba, and at that time she was chest pain-free.  She also mentioned that she was a DNR.  She was very adamant that she did not want any aggressive therapy.  She did get an echocardiogram, which showed a normal LV function with an EF of 66%, akinesis of the basal inferior wall, and hypokinesis of the basal inferoseptal, basal inferolateral, mid inferior, and mid inferolateral walls.  She also had moderate mitral regurgitation.  She also had a nuclear stress test which showed an inferior infarct but no areas of ischemia.  She was already on a statin drug, and she was discharged home in stable condition.  She's here today for follow-up.   Since she's been home she's been doing really well.  She is not having any chest pain.  She does have some pain in her back that is better when she lies down to go to bed at night.  She thinks it is pain from her broken ribs that is healing.  She denies any shortness of breath, palpitations, edema, dizziness, or syncope.  She is a little weak in her legs as well.  She  is to [facility] 3 miles at the [facility].  She wants to get back to doing this.      Past Medical History:   Diagnosis Date   • Benign essential HTN    • CAD (coronary artery disease)    • Diabetes    • Diverticulosis    • Fatigue    • Fatty liver    • GERD (gastroesophageal reflux disease) 4/10/2018   • Hyperlipidemia    • Myocardial infarction    • Palpitations    • Pericardial effusion    • Sleep apnea    • Thyroid disorder        Past Surgical History:   Procedure Laterality Date   • APPENDECTOMY     • CATARACT EXTRACTION     • COLONOSCOPY  07/15/2014    NBIH, tics,    • ENDOSCOPY  07/15/2014    acute gastritis   • HYSTERECTOMY     • THYROID BIOPSY     • TONSILLECTOMY         Social History     Social History   • Marital status:      Spouse name: N/A   • Number of children: N/A   • Years of education: N/A     Occupational History   • Not on file.     Social History Main Topics   • Smoking status: Former Smoker     Packs/day: 1.00     Years: 15.00   • Smokeless tobacco: Former User      Comment: quit 1980   • Alcohol use No   • Drug use: No   • Sexual activity: Defer     Other Topics Concern   • Not on file     Social History Narrative   • No narrative on file       Family History   Problem Relation Age of Onset   • Colon cancer Maternal Uncle    • Colon cancer Maternal Uncle    • No Known Problems Mother    • No Known Problems Father    • No Known Problems Maternal Grandmother    • No Known Problems Maternal Grandfather    • No Known Problems Paternal Grandmother    • No Known Problems Paternal Grandfather        Review of Systems   Constitution: Positive for weakness. Negative for chills, fever and malaise/fatigue.   Cardiovascular: Negative for chest pain, dyspnea on exertion, leg swelling, near-syncope, orthopnea, palpitations, paroxysmal nocturnal dyspnea and syncope.   Respiratory: Negative for cough and shortness of breath.    Musculoskeletal: Negative for joint pain, joint swelling and myalgias.  "  Gastrointestinal: Negative for abdominal pain, diarrhea, melena, nausea and vomiting.   Genitourinary: Negative for frequency and hematuria.   Neurological: Negative for light-headedness, numbness, paresthesias and seizures.   Allergic/Immunologic: Negative.    All other systems reviewed and are negative.      Allergies   Allergen Reactions   • Pioglitazone Rash         Current Outpatient Prescriptions:   •  aspirin 81 MG EC tablet, Take 1 tablet by mouth Daily for 30 days., Disp: 30 tablet, Rfl:   •  atenolol (TENORMIN) 25 MG tablet, Take 25 mg by mouth Daily., Disp: , Rfl:   •  glimepiride (AMARYL) 4 MG tablet, Take 4 mg by mouth 2 (Two) Times a Day., Disp: , Rfl:   •  levothyroxine sodium (TIROSINT) 100 MCG capsule, Take 100 mcg by mouth Daily., Disp: , Rfl:   •  omeprazole (priLOSEC) 20 MG capsule, Take 1 capsule by mouth 2 (Two) Times a Day. (Patient taking differently: Take 20 mg by mouth Daily.), Disp: 180 capsule, Rfl: 3  •  rosuvastatin (CRESTOR) 10 MG tablet, Take 10 mg by mouth Daily., Disp: , Rfl:   •  vitamin D (ERGOCALCIFEROL) 73263 UNITS capsule capsule, Take 50,000 Units by mouth 1 (One) Time Per Week. Patient takes it on every saturday, Disp: , Rfl:   •  zolpidem (AMBIEN) 10 MG tablet, Take 10 mg by mouth At Night As Needed for Sleep., Disp: , Rfl:       Objective:     Vitals:    04/26/18 0950 04/26/18 1006   BP: 120/62 122/68   BP Location: Right arm Left arm   Pulse: 76    SpO2: 99%    Weight: 63 kg (139 lb)    Height: 157.5 cm (62\")      Body mass index is 25.42 kg/m².    PHYSICAL EXAM:    Physical Exam   Constitutional: She is oriented to person, place, and time. She appears well-developed and well-nourished. No distress.   HENT:   Head: Normocephalic and atraumatic.   Eyes: Pupils are equal, round, and reactive to light.   Neck: No JVD present. No thyromegaly present.   Cardiovascular: Normal rate, regular rhythm, normal heart sounds and intact distal pulses.    No murmur " heard.  Pulmonary/Chest: Effort normal and breath sounds normal. No respiratory distress.   Abdominal: Soft. Bowel sounds are normal. She exhibits no distension. There is no splenomegaly or hepatomegaly. There is no tenderness.   Musculoskeletal: Normal range of motion. She exhibits no edema.   Neurological: She is alert and oriented to person, place, and time.   Skin: Skin is warm and dry. She is not diaphoretic. No erythema.   Psychiatric: She has a normal mood and affect. Her behavior is normal. Judgment normal.         ECG 12 Lead  Date/Time: 4/26/2018 10:13 AM  Performed by: RAJI YEE.  Authorized by: RAJI YEE.   Comparison: compared with previous ECG from 4/10/2018  Similar to previous ECG  Rhythm: sinus rhythm  BPM: 76  T depression: II, III and aVF  Q waves: III, aVF, V3, V4, V5 and V6  Clinical impression: abnormal ECG  Comments: Indication: Non-STEMI.              Assessment:       Diagnosis Plan   1. NSTEMI (non-ST elevated myocardial infarction)  ECG 12 Lead     Orders Placed This Encounter   Procedures   • ECG 12 Lead     This order was created via procedure documentation          Plan:       1.  Coronary Artery Disease  Assessment  • The patient has no angina  • There is a new diagnosis of stable angina in the past 12 months  • The patient is having symptoms consistent with unstable angina     Plan  • Lifestyle modifications discussed include adhering to a heart healthy diet, medication compliance and regular exercise    Subjective - Objective  • There is a history of past MI  • Current antiplatelet therapy includes aspirin 81 mg  • The patient qualifies for cardiac rehabilitation, and has been referred to cardiac rehab  • Overall she is doing well.  She is having no chest pain.  She is compliant with her medications.  Her primary care physician does prescribed her statin.  I'm going to get her enrolled in cardiac rehabilitation at the hospital.  She will follow-up with Dr. Villalba on  5/11/2018 or sooner if needed.        As always, it has been a pleasure to participate in your patient's care.      Sincerely,         Francia Vazquez PA-C

## 2018-05-11 ENCOUNTER — OFFICE VISIT (OUTPATIENT)
Dept: CARDIOLOGY | Facility: CLINIC | Age: 83
End: 2018-05-11

## 2018-05-11 VITALS
DIASTOLIC BLOOD PRESSURE: 78 MMHG | SYSTOLIC BLOOD PRESSURE: 138 MMHG | HEIGHT: 62 IN | BODY MASS INDEX: 25.36 KG/M2 | HEART RATE: 78 BPM | WEIGHT: 137.8 LBS

## 2018-05-11 DIAGNOSIS — I21.4 NSTEMI (NON-ST ELEVATED MYOCARDIAL INFARCTION) (HCC): Primary | ICD-10-CM

## 2018-05-11 DIAGNOSIS — E11.649 TYPE 2 DIABETES MELLITUS WITH HYPOGLYCEMIA WITHOUT COMA, WITHOUT LONG-TERM CURRENT USE OF INSULIN (HCC): ICD-10-CM

## 2018-05-11 DIAGNOSIS — Z66 DNR (DO NOT RESUSCITATE): ICD-10-CM

## 2018-05-11 PROCEDURE — 99214 OFFICE O/P EST MOD 30 MIN: CPT | Performed by: INTERNAL MEDICINE

## 2018-05-11 PROCEDURE — 93000 ELECTROCARDIOGRAM COMPLETE: CPT | Performed by: INTERNAL MEDICINE

## 2018-05-11 NOTE — PROGRESS NOTES
Date of Office Visit: 2018  Encounter Provider: David Villalba MD  Place of Service: [facility] CARDIOLOGY  Patient Name: Lisbet Wilks  :1934  [id number]    Chief Complaint   Patient presents with   • Coronary Artery Disease   :     HPI: Lisbet Wilks is a 84 y.o. female  this is a lady who came in late with an inferior MI and we have managed her conservatively because of her age and her wishes.  We did a stress test and add it showed inferior infarct and no areas of ischemia she had preserved LV function and we've elected to manage her medically she is doing well she has this discomfort between her shoulder blades she's not sure if it is related to her heart are not doesn't really seem to occur with activity and she does not have symptoms like she did with her chest when she had her infarct she's not having any shortness of breath or bleeding problems    Past Medical History:   Diagnosis Date   • Benign essential HTN    • CAD (coronary artery disease)    • Diabetes    • Diverticulosis    • Fatigue    • Fatty liver    • GERD (gastroesophageal reflux disease) 4/10/2018   • Hyperlipidemia    • Myocardial infarction    • Palpitations    • Pericardial effusion    • Sleep apnea    • Thyroid disorder        Past Surgical History:   Procedure Laterality Date   • APPENDECTOMY     • CATARACT EXTRACTION     • COLONOSCOPY  07/15/2014    NBIH, tics,    • ENDOSCOPY  07/15/2014    acute gastritis   • HYSTERECTOMY     • THYROID BIOPSY     • TONSILLECTOMY         Social History     Social History   • Marital status:      Spouse name: N/A   • Number of children: N/A   • Years of education: N/A     Occupational History   • Not on file.     Social History Main Topics   • Smoking status: Former Smoker     Packs/day: 1.00     Years: 15.00   • Smokeless tobacco: Former User      Comment: quit    • Alcohol use No   • Drug use: No   • Sexual activity: Defer     Other Topics  Concern   • Not on file     Social History Narrative   • No narrative on file       Family History   Problem Relation Age of Onset   • Colon cancer Maternal Uncle    • Colon cancer Maternal Uncle    • No Known Problems Mother    • No Known Problems Father    • No Known Problems Maternal Grandmother    • No Known Problems Maternal Grandfather    • No Known Problems Paternal Grandmother    • No Known Problems Paternal Grandfather        Review of Systems   Constitution: Negative for decreased appetite, fever, malaise/fatigue and weight loss.   HENT: Negative for nosebleeds.    Eyes: Negative for double vision.   Cardiovascular: Negative for chest pain, claudication, cyanosis, dyspnea on exertion, irregular heartbeat, leg swelling, near-syncope, orthopnea, palpitations, paroxysmal nocturnal dyspnea and syncope.   Respiratory: Negative for cough, hemoptysis and shortness of breath.    Hematologic/Lymphatic: Negative for bleeding problem.   Skin: Negative for rash.   Musculoskeletal: Negative for falls and myalgias.   Gastrointestinal: Negative for hematochezia, jaundice, melena, nausea and vomiting.   Genitourinary: Negative for hematuria.   Neurological: Negative for dizziness and seizures.   Psychiatric/Behavioral: Negative for altered mental status and memory loss.       Allergies   Allergen Reactions   • Pioglitazone Rash         Current Outpatient Prescriptions:   •  aspirin 81 MG EC tablet, Take 1 tablet by mouth Daily for 30 days., Disp: 30 tablet, Rfl:   •  atenolol (TENORMIN) 25 MG tablet, Take 25 mg by mouth Daily., Disp: , Rfl:   •  glimepiride (AMARYL) 4 MG tablet, Take 4 mg by mouth 2 (Two) Times a Day., Disp: , Rfl:   •  levothyroxine sodium (TIROSINT) 100 MCG capsule, Take 100 mcg by mouth Daily., Disp: , Rfl:   •  omeprazole (priLOSEC) 20 MG capsule, Take 1 capsule by mouth 2 (Two) Times a Day. (Patient taking differently: Take 20 mg by mouth Daily.), Disp: 180 capsule, Rfl: 3  •  rosuvastatin (CRESTOR)  "10 MG tablet, Take 10 mg by mouth Daily., Disp: , Rfl:   •  vitamin D (ERGOCALCIFEROL) 08036 UNITS capsule capsule, Take 50,000 Units by mouth 1 (One) Time Per Week. Patient takes it on every saturday, Disp: , Rfl:   •  zolpidem (AMBIEN) 10 MG tablet, Take 10 mg by mouth At Night As Needed for Sleep., Disp: , Rfl:       Objective:     Vitals:    05/11/18 1557   BP: 138/78   Pulse: 78   Weight: 62.5 kg (137 lb 12.8 oz)   Height: 157.5 cm (62\")     Body mass index is 25.2 kg/m².    Physical Exam   Constitutional: She is oriented to person, place, and time. She appears well-developed and well-nourished.   HENT:   Head: Normocephalic.   Eyes: No scleral icterus.   Neck: No JVD present. No thyromegaly present.   Cardiovascular: Normal rate, regular rhythm and normal heart sounds.  Exam reveals no gallop and no friction rub.    No murmur heard.  Pulmonary/Chest: Effort normal and breath sounds normal. She has no wheezes. She has no rales.   Abdominal: Soft. There is no hepatosplenomegaly. There is no tenderness.   Musculoskeletal: Normal range of motion. She exhibits no edema.   Lymphadenopathy:     She has no cervical adenopathy.   Neurological: She is alert and oriented to person, place, and time.   Skin: Skin is warm and dry. No rash noted.   Psychiatric: She has a normal mood and affect.         ECG 12 Lead  Date/Time: 5/11/2018 4:36 PM  Performed by: MESSI GEE  Authorized by: MESSI GEE   Comparison: compared with previous ECG   Similar to previous ECG  Rhythm: sinus rhythm  Clinical impression: abnormal ECG  Comments: Inferior MI age indeterminate lateral involvement also this is evolution of her infarct             Assessment:       Diagnosis Plan   1. NSTEMI (non-ST elevated myocardial infarction)     2. DNR (do not resuscitate)     3. Type 2 diabetes mellitus with hypoglycemia without coma, without long-term current use of insulin            Plan:       We are going to continue to manage her medically " I think she's doing well when a contrast stay on the same medications and have her come back and see us in 4 months if she were to get worse I would consider taken to the lab for rescue PCI I will have her come back and see us in 4 months    Coronary Artery Disease  Assessment  • The patient has no angina  • There is a new diagnosis of stable angina in the past 12 months    Plan  • Lifestyle modifications discussed include adhering to a heart healthy diet, maintenance of a healthy weight, medication compliance, regular exercise and regular monitoring of cholesterol and blood pressure    Subjective - Objective  • There is a history of past MI  • Current antiplatelet therapy includes aspirin 81 mg        As always, it has been a pleasure to participate in your patient's care.      Sincerely,       David Villalba MD

## 2018-05-15 ENCOUNTER — OFFICE VISIT (OUTPATIENT)
Dept: CARDIAC REHAB | Facility: HOSPITAL | Age: 83
End: 2018-05-15

## 2018-05-15 VITALS
DIASTOLIC BLOOD PRESSURE: 76 MMHG | HEART RATE: 82 BPM | SYSTOLIC BLOOD PRESSURE: 136 MMHG | WEIGHT: 137.2 LBS | BODY MASS INDEX: 25.25 KG/M2 | OXYGEN SATURATION: 97 % | HEIGHT: 62 IN

## 2018-05-15 DIAGNOSIS — I21.4 NON-ST ELEVATION (NSTEMI) MYOCARDIAL INFARCTION (HCC): Primary | ICD-10-CM

## 2018-05-15 PROCEDURE — 93797 PHYS/QHP OP CAR RHAB WO ECG: CPT

## 2018-05-15 NOTE — PROGRESS NOTES
"Cardiac Rehab Initial Assessment      Name: Lisbet Wilks  :1934 Allergies:Pioglitazone   MRN: [id number] 84 y.o. Physician: Aleah Lopes MD   Primary Diagnosis:    Diagnosis Plan   1. Non-ST elevation (NSTEMI) myocardial infarction      Event Date: 4/10/2018 diagnosed but was admitted to hospital on 2018 Specialist: Deejay   Secondary Diagnosis:  Risk Stratification:Moderate Risk Note Author: Lori Burrell RN     Cardiovascular History: Comments In  pt had palpitations, but does know what the cause was.  Cardiologist at the time started her on atenolol.     EXERCISE AT HOME  no  na  N/A    EF: 66%      Source: echo 4/10/2018          Ambulatory Status:Independent  Ambulatory Fall Risk Assessed on Initial Visit: yes 6 Minute Walk Pre- Cardiac Rehab:  Distance:997ft      RPE:2  Max. HR: 119       SPO2:80-98    MET: 2.4  MPH: 1.8             RPD: 1  Resting BP: 136/76 LA, 130/64 RA    Peak BP: 170/100  Recovery BP: 140/86  Comments: Walked stopped at 4 minutes 30 seconds secondary to low sat reading.  Not sure of accuracy as pt rated SOA as a \"1\" on dyspnea scale.  She had no signs of low oxygen or difficulty breathing.     NUTRITION  Lipids:Pt doesn't think so If yes, labs as follows; chart notes she does  Total: No components found for: CHOLESTEROL  HDL: No results found for: HDL Lipids continued:  LDL:No results found for: LDL  Triglyceride: No components found for: TRIGLYCERIDE   Weight Management:                 Weight: 137.2 lbs  Height: 62 in                                   BMI: Body mass index is 25.09 kg/m².  Waist Circumference: 38.75  inches   Alcohol Use: none Diabetes:Yes,  Monitors BS at home- yes, Frequency: 1 time daily, Random BS: 105 today    Last HGBA1C with date if applicable:No components found for: A1C         SOCIAL HISTORY  Social History     Social History   • Marital status:      Social History Main Topics   • Smoking status: Former Smoker     Packs/day: 1.00 "     Years: 15.00   • Smokeless tobacco: Former User      Comment: quit 1980   • Alcohol use No   • Drug use: No   • Sexual activity: Defer     Other Topics Concern   • Not on file       Educational Level (choose one that applies) high school diploma/GED Learning Barriers:Ready to Learn, Vision Pt is BLIND in left eye. Hearing s/w decreased    Family Support:yes    Living Arrangement: lives with their daughter, Kerline    Risk Factors: Stress  Yes, Clinical Depression  No, Heredity  Yes If Yes younger sister with OPH, uncle and grandfater, Hyperlipidemia  Yes, Diabetes  Yes If Yes: Do you check blood glucose daily  Yes Today's glucose level 105, Exercise prior to event  No If Yes: Activity na, Minutes per erma, Days per week na and Obesity  No     Tobacco Adjunct: No   Quit smoking in 1980's.  Smoked one PPD.  Started smoking age 15.      Comorbidities: Diabetes Mellitus     PSYCHOSOCIAL  Clinical Depression: no    Stress: yes     Assess presence or absence of depression using a valid screening tool: yes      PHYSICAL ASSESSMENT  Influenza vaccine: no  Pneumococcal vaccine: yes          Angina: no    Describe angina scale of 0 - 4: 0 = none    Today are you having incisional pain? N/A. If, Yes, Scale: na        Today are you having any other pain? Yes. If, Yes, Scale: 5 Has some off and on discomfort in middle of back.  Also had a fall at home 3/2018 and had right rib fracture.  Pt says not having much pain anymore with that     Diagnosed with Hypertension:no, Pt denies, but chart says yes    Heart Sounds: S1 S2 three irregular beats auscultated in one minute     Lung Sounds: normal air entry, lungs clear to auscultation         Assessment: Pt alert and appropriate to conversation. Orthopedic Problems: none    Are you being hurt, hit, or frightened by anyone at home or in your life? no    Are you being neglected by a caregiver? No Shoulder flexibility/Range of motion: Above average     Recommended arm activity: Any  As  long as it is not bothering her right rib cage    Chair sit and reach within: 6 inches   Leg flexibility: Above average    Leg Strength/Balance/Five times sit to stand: 12 seconds.     Chose one: Average    Recommended stretching: Standing    Assessment: Skin warm, dry, pink    Family attends [place]: yes, daughter Kerline Time of arrival: 0930  Time of departure: 1050     Patient Goals: MET 3-4 Be able to do housework.  Resume walking 3 miles at [employer].  Resume driving.  Learn quick heart healthy meals for one person.         5/15/2018  9:14 AM  Lori Burrell RN

## 2018-05-18 ENCOUNTER — TELEPHONE (OUTPATIENT)
Dept: CARDIOLOGY | Facility: CLINIC | Age: 83
End: 2018-05-18

## 2018-05-18 ENCOUNTER — TREATMENT (OUTPATIENT)
Dept: CARDIAC REHAB | Facility: HOSPITAL | Age: 83
End: 2018-05-18

## 2018-05-18 ENCOUNTER — CLINICAL SUPPORT (OUTPATIENT)
Dept: CARDIOLOGY | Facility: CLINIC | Age: 83
End: 2018-05-18

## 2018-05-18 VITALS — DIASTOLIC BLOOD PRESSURE: 64 MMHG | HEART RATE: 127 BPM | SYSTOLIC BLOOD PRESSURE: 112 MMHG

## 2018-05-18 DIAGNOSIS — R00.0 TACHYCARDIA: Primary | ICD-10-CM

## 2018-05-18 DIAGNOSIS — I21.4 NON-ST ELEVATION (NSTEMI) MYOCARDIAL INFARCTION (HCC): Primary | ICD-10-CM

## 2018-05-18 PROCEDURE — 93798 PHYS/QHP OP CAR RHAB W/ECG: CPT

## 2018-05-18 PROCEDURE — 93000 ELECTROCARDIOGRAM COMPLETE: CPT | Performed by: PHYSICIAN ASSISTANT

## 2018-05-18 RX ORDER — ATENOLOL 25 MG/1
25 TABLET ORAL 2 TIMES DAILY
Qty: 180 TABLET | Refills: 1 | Status: SHIPPED | OUTPATIENT
Start: 2018-05-18 | End: 2018-09-19 | Stop reason: SDUPTHER

## 2018-05-18 NOTE — PROGRESS NOTES
"Procedure     ECG 12 Lead  Date/Time: 5/18/2018 3:18 PM  Performed by: RAJI YEE.  Authorized by: RAJI YEE.   Comparison: not compared with previous ECG   Previous ECG: no previous ECG available  Rhythm: sinus tachycardia  BPM: 127  Clinical impression: abnormal ECG  Comments: Indication: Tachycardia.    Multifocal atrial tachycardia.           Pt was in cardiac rehab when her heart rate went from 120 to 160 during cool down. The rehab nurses call here for patient to come up for an EKG to ensure she was not in atrial fib. I performed an EKG, pt states she was not having any symptoms, no soa, no dizziness or lightheadedness, states her arms just felt \"funny\". Heart rate was 127, bp 112/64 & patient is on Atenolol 25 mg qd. Reviewed with Raji, she has increased Atenolol 25 mg to bid from qd. I wrote this down for the patient & advised her to keep an eye on her bp & to only take a 1/2 tab if bp is too low. She will call her if her systolic dips under 100. EKG to Raji for interpretation. dmk  "

## 2018-05-21 ENCOUNTER — TREATMENT (OUTPATIENT)
Dept: CARDIAC REHAB | Facility: HOSPITAL | Age: 83
End: 2018-05-21

## 2018-05-21 DIAGNOSIS — I21.4 NON-ST ELEVATION (NSTEMI) MYOCARDIAL INFARCTION (HCC): Primary | ICD-10-CM

## 2018-05-21 PROCEDURE — 93798 PHYS/QHP OP CAR RHAB W/ECG: CPT

## 2018-05-23 ENCOUNTER — TREATMENT (OUTPATIENT)
Dept: CARDIAC REHAB | Facility: HOSPITAL | Age: 83
End: 2018-05-23

## 2018-05-23 DIAGNOSIS — I21.4 NON-ST ELEVATION (NSTEMI) MYOCARDIAL INFARCTION (HCC): Primary | ICD-10-CM

## 2018-05-23 PROCEDURE — 93798 PHYS/QHP OP CAR RHAB W/ECG: CPT

## 2018-05-30 ENCOUNTER — TREATMENT (OUTPATIENT)
Dept: CARDIAC REHAB | Facility: HOSPITAL | Age: 83
End: 2018-05-30

## 2018-05-30 DIAGNOSIS — I21.4 NON-ST ELEVATION (NSTEMI) MYOCARDIAL INFARCTION (HCC): Primary | ICD-10-CM

## 2018-05-30 PROCEDURE — 93798 PHYS/QHP OP CAR RHAB W/ECG: CPT

## 2018-06-01 ENCOUNTER — TREATMENT (OUTPATIENT)
Dept: CARDIAC REHAB | Facility: HOSPITAL | Age: 83
End: 2018-06-01

## 2018-06-01 DIAGNOSIS — I21.4 NON-ST ELEVATION (NSTEMI) MYOCARDIAL INFARCTION (HCC): Primary | ICD-10-CM

## 2018-06-01 PROCEDURE — 93798 PHYS/QHP OP CAR RHAB W/ECG: CPT

## 2018-06-06 ENCOUNTER — TREATMENT (OUTPATIENT)
Dept: CARDIAC REHAB | Facility: HOSPITAL | Age: 83
End: 2018-06-06

## 2018-06-06 DIAGNOSIS — I21.4 NON-ST ELEVATION (NSTEMI) MYOCARDIAL INFARCTION (HCC): Primary | ICD-10-CM

## 2018-06-06 PROCEDURE — 93798 PHYS/QHP OP CAR RHAB W/ECG: CPT

## 2018-06-08 ENCOUNTER — TREATMENT (OUTPATIENT)
Dept: CARDIAC REHAB | Facility: HOSPITAL | Age: 83
End: 2018-06-08

## 2018-06-08 DIAGNOSIS — I24.9 ACUTE CORONARY SYNDROME (HCC): Primary | ICD-10-CM

## 2018-06-08 PROCEDURE — 93798 PHYS/QHP OP CAR RHAB W/ECG: CPT

## 2018-06-11 ENCOUNTER — TREATMENT (OUTPATIENT)
Dept: CARDIAC REHAB | Facility: HOSPITAL | Age: 83
End: 2018-06-11

## 2018-06-11 DIAGNOSIS — I24.9 ACUTE CORONARY SYNDROME (HCC): Primary | ICD-10-CM

## 2018-06-11 DIAGNOSIS — I21.4 NON-ST ELEVATION (NSTEMI) MYOCARDIAL INFARCTION (HCC): ICD-10-CM

## 2018-06-11 PROCEDURE — 93798 PHYS/QHP OP CAR RHAB W/ECG: CPT

## 2018-06-15 ENCOUNTER — TREATMENT (OUTPATIENT)
Dept: CARDIAC REHAB | Facility: HOSPITAL | Age: 83
End: 2018-06-15

## 2018-06-15 DIAGNOSIS — I24.9 ACUTE CORONARY SYNDROME (HCC): Primary | ICD-10-CM

## 2018-06-15 PROCEDURE — 93798 PHYS/QHP OP CAR RHAB W/ECG: CPT

## 2018-06-18 ENCOUNTER — TREATMENT (OUTPATIENT)
Dept: CARDIAC REHAB | Facility: HOSPITAL | Age: 83
End: 2018-06-18

## 2018-06-18 DIAGNOSIS — I24.9 ACUTE CORONARY SYNDROME (HCC): Primary | ICD-10-CM

## 2018-06-18 DIAGNOSIS — I21.4 NON-ST ELEVATION (NSTEMI) MYOCARDIAL INFARCTION (HCC): ICD-10-CM

## 2018-06-18 PROCEDURE — 93798 PHYS/QHP OP CAR RHAB W/ECG: CPT

## 2018-06-20 ENCOUNTER — TREATMENT (OUTPATIENT)
Dept: CARDIAC REHAB | Facility: HOSPITAL | Age: 83
End: 2018-06-20

## 2018-06-20 DIAGNOSIS — I24.9 ACUTE CORONARY SYNDROME (HCC): Primary | ICD-10-CM

## 2018-06-20 PROCEDURE — 93798 PHYS/QHP OP CAR RHAB W/ECG: CPT

## 2018-06-25 ENCOUNTER — TREATMENT (OUTPATIENT)
Dept: CARDIAC REHAB | Facility: HOSPITAL | Age: 83
End: 2018-06-25

## 2018-06-25 DIAGNOSIS — I24.9 ACUTE CORONARY SYNDROME (HCC): Primary | ICD-10-CM

## 2018-06-25 PROCEDURE — 93798 PHYS/QHP OP CAR RHAB W/ECG: CPT

## 2018-06-29 ENCOUNTER — TREATMENT (OUTPATIENT)
Dept: CARDIAC REHAB | Facility: HOSPITAL | Age: 83
End: 2018-06-29

## 2018-06-29 DIAGNOSIS — I24.9 ACUTE CORONARY SYNDROME (HCC): Primary | ICD-10-CM

## 2018-06-29 PROCEDURE — 93798 PHYS/QHP OP CAR RHAB W/ECG: CPT

## 2018-07-06 ENCOUNTER — TREATMENT (OUTPATIENT)
Dept: CARDIAC REHAB | Facility: HOSPITAL | Age: 83
End: 2018-07-06

## 2018-07-06 DIAGNOSIS — I21.4 NON-ST ELEVATION (NSTEMI) MYOCARDIAL INFARCTION (HCC): ICD-10-CM

## 2018-07-06 DIAGNOSIS — I24.9 ACUTE CORONARY SYNDROME (HCC): Primary | ICD-10-CM

## 2018-07-06 PROCEDURE — 93798 PHYS/QHP OP CAR RHAB W/ECG: CPT

## 2018-07-09 ENCOUNTER — APPOINTMENT (OUTPATIENT)
Dept: CARDIAC REHAB | Facility: HOSPITAL | Age: 83
End: 2018-07-09

## 2018-07-11 ENCOUNTER — APPOINTMENT (OUTPATIENT)
Dept: CARDIAC REHAB | Facility: HOSPITAL | Age: 83
End: 2018-07-11

## 2018-07-13 ENCOUNTER — APPOINTMENT (OUTPATIENT)
Dept: CARDIAC REHAB | Facility: HOSPITAL | Age: 83
End: 2018-07-13

## 2018-07-16 ENCOUNTER — APPOINTMENT (OUTPATIENT)
Dept: CARDIAC REHAB | Facility: HOSPITAL | Age: 83
End: 2018-07-16

## 2018-07-18 ENCOUNTER — APPOINTMENT (OUTPATIENT)
Dept: CARDIAC REHAB | Facility: HOSPITAL | Age: 83
End: 2018-07-18

## 2018-07-20 ENCOUNTER — APPOINTMENT (OUTPATIENT)
Dept: CARDIAC REHAB | Facility: HOSPITAL | Age: 83
End: 2018-07-20

## 2018-07-23 ENCOUNTER — APPOINTMENT (OUTPATIENT)
Dept: CARDIAC REHAB | Facility: HOSPITAL | Age: 83
End: 2018-07-23

## 2018-07-25 ENCOUNTER — APPOINTMENT (OUTPATIENT)
Dept: CARDIAC REHAB | Facility: HOSPITAL | Age: 83
End: 2018-07-25

## 2018-07-27 ENCOUNTER — APPOINTMENT (OUTPATIENT)
Dept: CARDIAC REHAB | Facility: HOSPITAL | Age: 83
End: 2018-07-27

## 2018-07-30 ENCOUNTER — APPOINTMENT (OUTPATIENT)
Dept: CARDIAC REHAB | Facility: HOSPITAL | Age: 83
End: 2018-07-30

## 2018-08-01 ENCOUNTER — APPOINTMENT (OUTPATIENT)
Dept: CARDIAC REHAB | Facility: HOSPITAL | Age: 83
End: 2018-08-01

## 2018-08-03 ENCOUNTER — APPOINTMENT (OUTPATIENT)
Dept: CARDIAC REHAB | Facility: HOSPITAL | Age: 83
End: 2018-08-03

## 2018-08-06 ENCOUNTER — APPOINTMENT (OUTPATIENT)
Dept: CARDIAC REHAB | Facility: HOSPITAL | Age: 83
End: 2018-08-06

## 2018-08-08 ENCOUNTER — APPOINTMENT (OUTPATIENT)
Dept: CARDIAC REHAB | Facility: HOSPITAL | Age: 83
End: 2018-08-08

## 2018-08-10 ENCOUNTER — APPOINTMENT (OUTPATIENT)
Dept: CARDIAC REHAB | Facility: HOSPITAL | Age: 83
End: 2018-08-10

## 2018-08-13 ENCOUNTER — APPOINTMENT (OUTPATIENT)
Dept: CARDIAC REHAB | Facility: HOSPITAL | Age: 83
End: 2018-08-13

## 2018-08-15 ENCOUNTER — APPOINTMENT (OUTPATIENT)
Dept: CARDIAC REHAB | Facility: HOSPITAL | Age: 83
End: 2018-08-15

## 2018-08-17 ENCOUNTER — APPOINTMENT (OUTPATIENT)
Dept: CARDIAC REHAB | Facility: HOSPITAL | Age: 83
End: 2018-08-17

## 2018-08-20 ENCOUNTER — APPOINTMENT (OUTPATIENT)
Dept: CARDIAC REHAB | Facility: HOSPITAL | Age: 83
End: 2018-08-20

## 2018-08-22 ENCOUNTER — APPOINTMENT (OUTPATIENT)
Dept: CARDIAC REHAB | Facility: HOSPITAL | Age: 83
End: 2018-08-22

## 2018-09-10 ENCOUNTER — TELEPHONE (OUTPATIENT)
Dept: CARDIOLOGY | Facility: CLINIC | Age: 83
End: 2018-09-10

## 2018-09-10 NOTE — TELEPHONE ENCOUNTER
Pt left message on vm stating she is scheduled for a routine dental cleaning 09/20/18 and she was told to call to our office to see if she needed antibiotic before cleaning.    Please advise      Pt can be reached [phone]

## 2018-09-19 RX ORDER — ATENOLOL 25 MG/1
TABLET ORAL
Qty: 180 TABLET | Refills: 0 | Status: SHIPPED | OUTPATIENT
Start: 2018-09-19 | End: 2019-09-09 | Stop reason: SDUPTHER

## 2018-09-28 ENCOUNTER — OFFICE VISIT (OUTPATIENT)
Dept: CARDIOLOGY | Facility: CLINIC | Age: 83
End: 2018-09-28

## 2018-09-28 VITALS
WEIGHT: 136.8 LBS | HEART RATE: 66 BPM | BODY MASS INDEX: 25.17 KG/M2 | SYSTOLIC BLOOD PRESSURE: 130 MMHG | DIASTOLIC BLOOD PRESSURE: 76 MMHG | HEIGHT: 62 IN

## 2018-09-28 DIAGNOSIS — I21.4 NSTEMI (NON-ST ELEVATED MYOCARDIAL INFARCTION) (HCC): Primary | ICD-10-CM

## 2018-09-28 DIAGNOSIS — E11.649 TYPE 2 DIABETES MELLITUS WITH HYPOGLYCEMIA WITHOUT COMA, WITHOUT LONG-TERM CURRENT USE OF INSULIN (HCC): ICD-10-CM

## 2018-09-28 PROCEDURE — 93000 ELECTROCARDIOGRAM COMPLETE: CPT | Performed by: INTERNAL MEDICINE

## 2018-09-28 PROCEDURE — 99214 OFFICE O/P EST MOD 30 MIN: CPT | Performed by: INTERNAL MEDICINE

## 2018-09-28 NOTE — PROGRESS NOTES
Date of Office Visit: 2018  Encounter Provider: David Villalba MD  Place of Service: [facility] CARDIOLOGY  Patient Name: Lisbet Wilks  :1934  [id number]    Chief Complaint   Patient presents with   • Coronary Artery Disease   :     HPI: Lisbet Wilks is a 84 y.o. female  this is a lady who came in late with an inferior MI and we have managed her conservatively because of her age and her wishes.  We did a stress test and add it showed inferior infarct and no areas of ischemia she had preserved LV function and we've elected to manage her medically.  She's been doing well she walks 2 miles a day without without difficulty she does not have any symptoms in general is doing great    Past Medical History:   Diagnosis Date   • Benign essential HTN    • CAD (coronary artery disease)    • Diabetes (CMS/HCC)    • Diverticulosis    • Fatigue    • Fatty liver    • GERD (gastroesophageal reflux disease) 4/10/2018   • Hyperlipidemia    • Myocardial infarction    • Palpitations    • Pericardial effusion    • Sleep apnea    • Thyroid disorder        Past Surgical History:   Procedure Laterality Date   • APPENDECTOMY     • CATARACT EXTRACTION     • COLONOSCOPY  07/15/2014    CARMELINA, tori,    • ENDOSCOPY  07/15/2014    acute gastritis   • HYSTERECTOMY     • THYROID BIOPSY     • TONSILLECTOMY         Social History     Social History   • Marital status:      Spouse name: N/A   • Number of children: N/A   • Years of education: 12     Occupational History   • retired        Social History Main Topics   • Smoking status: Former Smoker     Packs/day: 1.00     Years: 31.00   • Smokeless tobacco: Former User      Comment: quit    • Alcohol use No   • Drug use: No   • Sexual activity: Defer     Other Topics Concern   • Not on file     Social History Narrative   • No narrative on file       Family History   Problem Relation Age of Onset   • Colon cancer Maternal Uncle    •  Colon cancer Maternal Uncle    • No Known Problems Mother    • No Known Problems Father    • No Known Problems Maternal Grandmother    • No Known Problems Maternal Grandfather    • No Known Problems Paternal Grandmother    • No Known Problems Paternal Grandfather        Review of Systems   Constitution: Negative for decreased appetite, fever, malaise/fatigue and weight loss.   HENT: Negative for nosebleeds.    Eyes: Negative for double vision.   Cardiovascular: Negative for chest pain, claudication, cyanosis, dyspnea on exertion, irregular heartbeat, leg swelling, near-syncope, orthopnea, palpitations, paroxysmal nocturnal dyspnea and syncope.   Respiratory: Negative for cough, hemoptysis and shortness of breath.    Hematologic/Lymphatic: Negative for bleeding problem.   Skin: Negative for rash.   Musculoskeletal: Negative for falls and myalgias.   Gastrointestinal: Negative for hematochezia, jaundice, melena, nausea and vomiting.   Genitourinary: Negative for hematuria.   Neurological: Negative for dizziness and seizures.   Psychiatric/Behavioral: Negative for altered mental status and memory loss.       Allergies   Allergen Reactions   • Pioglitazone Rash         Current Outpatient Prescriptions:   •  atenolol (TENORMIN) 25 MG tablet, TAKE 1 TABLET TWICE DAILY, Disp: 180 tablet, Rfl: 0  •  glimepiride (AMARYL) 4 MG tablet, Take 4 mg by mouth 2 (Two) Times a Day., Disp: , Rfl:   •  levothyroxine sodium (TIROSINT) 100 MCG capsule, Take 100 mcg by mouth Daily., Disp: , Rfl:   •  omeprazole (priLOSEC) 20 MG capsule, Take 1 capsule by mouth 2 (Two) Times a Day. (Patient taking differently: Take 20 mg by mouth Daily.), Disp: 180 capsule, Rfl: 3  •  rosuvastatin (CRESTOR) 10 MG tablet, Take 10 mg by mouth Daily., Disp: , Rfl:   •  vitamin D (ERGOCALCIFEROL) 38645 UNITS capsule capsule, Take 50,000 Units by mouth 1 (One) Time Per Week. Patient takes it on every saturday, Disp: , Rfl:   •  zolpidem (AMBIEN) 10 MG tablet,  "Take 10 mg by mouth At Night As Needed for Sleep., Disp: , Rfl:       Objective:     Vitals:    09/28/18 1037   BP: 130/76   Pulse: 66   Weight: 62.1 kg (136 lb 12.8 oz)   Height: 157.5 cm (62\")     Body mass index is 25.02 kg/m².    Physical Exam   Constitutional: She is oriented to person, place, and time. She appears well-developed and well-nourished.   HENT:   Head: Normocephalic.   Eyes: No scleral icterus.   Neck: No JVD present. No thyromegaly present.   Cardiovascular: Normal rate, regular rhythm and normal heart sounds.  Exam reveals no gallop and no friction rub.    No murmur heard.  Pulmonary/Chest: Effort normal and breath sounds normal. She has no wheezes. She has no rales.   Abdominal: Soft. There is no hepatosplenomegaly. There is no tenderness.   Musculoskeletal: Normal range of motion. She exhibits no edema.   Lymphadenopathy:     She has no cervical adenopathy.   Neurological: She is alert and oriented to person, place, and time.   Skin: Skin is warm and dry. No rash noted.   Psychiatric: She has a normal mood and affect.         ECG 12 Lead  Date/Time: 9/28/2018 11:12 AM  Performed by: MESSI GEE  Authorized by: MESSI GEE   Comparison: compared with previous ECG   Similar to previous ECG  Rhythm: sinus rhythm  Clinical impression: abnormal ECG  Comments: Inferior MI age indeterminate             Assessment:       Diagnosis Plan   1. NSTEMI (non-ST elevated myocardial infarction) (CMS/[facility])     2. Type 2 diabetes mellitus with hypoglycemia without coma, without long-term current use of insulin (CMS/[facility])            Plan:       We are going to continue to manage her medically I think she's doing well.  She is doing very well asymptomatic function at a high level we'll continue with her current therapy I'll have her come see Francia in a year and see me in 2    Coronary Artery Disease  Assessment  • The patient has no angina  • There is a new diagnosis of stable angina in the past 12 " months    Plan  • Lifestyle modifications discussed include adhering to a heart healthy diet, maintenance of a healthy weight, medication compliance, regular exercise and regular monitoring of cholesterol and blood pressure    Subjective - Objective  • There is a history of past MI  • Current antiplatelet therapy includes aspirin 81 mg        As always, it has been a pleasure to participate in your patient's care.      Sincerely,       David Villalba MD

## 2019-09-09 RX ORDER — ATENOLOL 25 MG/1
TABLET ORAL
Qty: 180 TABLET | Refills: 0 | Status: SHIPPED | OUTPATIENT
Start: 2019-09-09 | End: 2019-11-13 | Stop reason: SDUPTHER

## 2019-09-30 ENCOUNTER — OFFICE VISIT (OUTPATIENT)
Dept: CARDIOLOGY | Facility: CLINIC | Age: 84
End: 2019-09-30

## 2019-09-30 VITALS
RESPIRATION RATE: 18 BRPM | OXYGEN SATURATION: 96 % | HEART RATE: 72 BPM | WEIGHT: 140 LBS | HEIGHT: 62 IN | DIASTOLIC BLOOD PRESSURE: 76 MMHG | SYSTOLIC BLOOD PRESSURE: 148 MMHG | BODY MASS INDEX: 25.76 KG/M2

## 2019-09-30 DIAGNOSIS — I25.10 CORONARY ARTERY DISEASE INVOLVING NATIVE HEART WITHOUT ANGINA PECTORIS, UNSPECIFIED VESSEL OR LESION TYPE: Primary | ICD-10-CM

## 2019-09-30 PROCEDURE — 99213 OFFICE O/P EST LOW 20 MIN: CPT | Performed by: NURSE PRACTITIONER

## 2019-09-30 PROCEDURE — 93000 ELECTROCARDIOGRAM COMPLETE: CPT | Performed by: NURSE PRACTITIONER

## 2019-09-30 RX ORDER — LEVOTHYROXINE SODIUM 88 UG/1
88 TABLET ORAL DAILY
COMMUNITY
Start: 2019-09-20 | End: 2020-04-09

## 2019-09-30 NOTE — PROGRESS NOTES
Date of Office Visit: 2019  Encounter Provider: IBSHOP Rausch  Place of Service: [facility] CARDIOLOGY  Patient Name: Lisbet Wilks  :1934    Chief Complaint   Patient presents with   • Coronary Artery Disease     1 YR FOLLOW UP   :     HPI: Lisbet Wilks is a 85 y.o. female, new to me, who presents today for follow-up.  Old records have been obtained and reviewed by me.  She is a patient of Dr. Villalba's with a past cardiac history significant for coronary artery disease.  In 2018, she presented to the ER with chest pain.  She was found to have an abnormal EKG and an elevated troponin.  She was adamant that she did not want any aggressive therapy.  She did undergo an echocardiogram which revealed normal LV function with an EF of 66%, akinesis of the basal inferior wall, hypokinesis of the basal inferoseptal, basal inferolateral, mid inferior, and mid inferolateral walls.  She also had moderate mitral regurgitation.  She had a nuclear stress test which showed an inferior infarct but no areas of ischemia.  She has been medically managed since.  She was last seen in the office by Dr. Villalba on 2018 at which time she was doing well with no complaints of angina or heart failure.  She was walking 2 miles a day without any difficulty.  No changes were made to her medical regimen and she was advised to follow-up in 1 year.   For the past year, she has overall been doing well from a cardiac standpoint.  She denies any shortness of air, edema, or syncope.  She does have occasional palpitations.  Additionally, she has occasional lightheadedness.  She does not really have chest pain per se, but she says she has occasional and random sharp pains in her chest that lasts only for a couple of seconds.  The pain will occasionally extend into her back.  They are not brought on by exertion.  She continues to walk 2 miles a day without any difficulty.    Past Medical  History:   Diagnosis Date   • Benign essential HTN    • CAD (coronary artery disease)    • Diabetes (CMS/HCC)    • Diverticulosis    • Fatigue    • Fatty liver    • GERD (gastroesophageal reflux disease) 4/10/2018   • Hyperlipidemia    • Myocardial infarction (CMS/HCC)    • Palpitations    • Pericardial effusion    • Sleep apnea    • Thyroid disorder        Past Surgical History:   Procedure Laterality Date   • APPENDECTOMY     • CATARACT EXTRACTION     • COLONOSCOPY  07/15/2014    NBIH, tics,    • ENDOSCOPY  07/15/2014    acute gastritis   • HYSTERECTOMY     • THYROID BIOPSY     • TONSILLECTOMY         Social History     Socioeconomic History   • Marital status:      Spouse name: Not on file   • Number of children: Not on file   • Years of education: 12   • Highest education level: Not on file   Occupational History   • Occupation: retired     Tobacco Use   • Smoking status: Former Smoker     Packs/day: 1.00     Years: 31.00     Pack years: 31.00   • Smokeless tobacco: Former User   • Tobacco comment: CAFFEINE USE- 2 CUPS COFFEE DAILY   Substance and Sexual Activity   • Alcohol use: No   • Drug use: No   • Sexual activity: Defer       Family History   Problem Relation Age of Onset   • Colon cancer Maternal Uncle    • Colon cancer Maternal Uncle    • No Known Problems Mother    • No Known Problems Father    • No Known Problems Maternal Grandmother    • No Known Problems Maternal Grandfather    • No Known Problems Paternal Grandmother    • No Known Problems Paternal Grandfather        Review of Systems   Constitution: Negative for chills, fever and malaise/fatigue.   Cardiovascular: Positive for palpitations. Negative for chest pain, dyspnea on exertion, leg swelling, near-syncope, orthopnea, paroxysmal nocturnal dyspnea and syncope.   Respiratory: Negative for cough and shortness of breath.    Musculoskeletal: Negative for joint pain, joint swelling and myalgias.   Gastrointestinal: Negative for  "abdominal pain, diarrhea, melena, nausea and vomiting.   Genitourinary: Negative for frequency and hematuria.   Neurological: Positive for light-headedness. Negative for numbness, paresthesias and seizures.   Allergic/Immunologic: Negative.    All other systems reviewed and are negative.      Allergies   Allergen Reactions   • Pioglitazone Rash         Current Outpatient Medications:   •  aspirin 81 MG tablet, Take 81 mg by mouth Daily., Disp: , Rfl:   •  atenolol (TENORMIN) 25 MG tablet, TAKE 1 TABLET TWICE DAILY, Disp: 180 tablet, Rfl: 0  •  glimepiride (AMARYL) 4 MG tablet, Take 4 mg by mouth 2 (Two) Times a Day., Disp: , Rfl:   •  levothyroxine (SYNTHROID, LEVOTHROID) 88 MCG tablet, Take 88 mcg by mouth Daily., Disp: , Rfl:   •  omeprazole (priLOSEC) 20 MG capsule, Take 1 capsule by mouth 2 (Two) Times a Day. (Patient taking differently: Take 20 mg by mouth Daily.), Disp: 180 capsule, Rfl: 3  •  rosuvastatin (CRESTOR) 10 MG tablet, Take 10 mg by mouth Daily., Disp: , Rfl:   •  zolpidem (AMBIEN) 10 MG tablet, Take 10 mg by mouth At Night As Needed for Sleep., Disp: , Rfl:       Objective:     Vitals:    09/30/19 1317 09/30/19 1329   BP: 148/76 148/76   BP Location: Right arm Left arm   Patient Position: Sitting Sitting   Pulse: 72    Resp: 18    SpO2: 96%    Weight: 63.5 kg (140 lb)    Height: 157.5 cm (62\")      Body mass index is 25.61 kg/m².    PHYSICAL EXAM:    Physical Exam   Constitutional: She is oriented to person, place, and time. She appears well-developed and well-nourished. No distress.   HENT:   Head: Normocephalic and atraumatic.   Eyes: Pupils are equal, round, and reactive to light.   Neck: No JVD present. No thyromegaly present.   Cardiovascular: Normal rate, regular rhythm, normal heart sounds and intact distal pulses.   No murmur heard.  Pulmonary/Chest: Effort normal and breath sounds normal. No respiratory distress.   Abdominal: Soft. Bowel sounds are normal. She exhibits no distension. There " is no splenomegaly or hepatomegaly. There is no tenderness.   Musculoskeletal: Normal range of motion. She exhibits no edema.   Neurological: She is alert and oriented to person, place, and time.   Skin: Skin is warm and dry. She is not diaphoretic. No erythema.   Psychiatric: She has a normal mood and affect. Her behavior is normal. Judgment normal.         ECG 12 Lead  Date/Time: 9/30/2019 1:36 PM  Performed by: Radha Delgado APRN  Authorized by: Radha Delgado APRN   Comparison: compared with previous ECG from 9/28/2018  Similar to previous ECG  Rhythm: sinus rhythm  Rate: normal  BPM: 68  Q waves: V3    T inversion: II, III, aVF, V5 and V6    Clinical impression: abnormal EKG  Comments: Indication: CAD  Q waves and T wave inversions unchanged from prior              Assessment:       Diagnosis Plan   1. Coronary artery disease involving native heart without angina pectoris, unspecified vessel or lesion type  ECG 12 Lead     Orders Placed This Encounter   Procedures   • ECG 12 Lead     This order was created via procedure documentation          Plan:       Overall I think she is doing well from a cardiac standpoint.  She denies any symptoms of angina or heart failure.  She remains on good medical therapy including aspirin, atenolol, and rosuvastatin.  I do not think the occasional sharp pain in her chest is anginal.  She continues to walk 2 miles a day with no difficulty.  I am not going to make any changes to her medical regimen and she will follow-up with Dr. Villalba in 1 year or sooner if needed.      As always, it has been a pleasure to participate in your patient's care.      Sincerely,         BISHOP Yeung

## 2019-11-13 RX ORDER — ATENOLOL 25 MG/1
TABLET ORAL
Qty: 180 TABLET | Refills: 0 | Status: SHIPPED | OUTPATIENT
Start: 2019-11-13 | End: 2020-01-20

## 2020-01-20 RX ORDER — ATENOLOL 25 MG/1
TABLET ORAL
Qty: 180 TABLET | Refills: 2 | Status: SHIPPED | OUTPATIENT
Start: 2020-01-20 | End: 2020-11-04

## 2020-04-09 RX ORDER — LEVOTHYROXINE SODIUM 88 UG/1
TABLET ORAL
Qty: 90 TABLET | Refills: 1 | Status: SHIPPED | OUTPATIENT
Start: 2020-04-09 | End: 2020-09-17

## 2020-06-09 ENCOUNTER — OFFICE VISIT (OUTPATIENT)
Dept: FAMILY MEDICINE CLINIC | Facility: CLINIC | Age: 85
End: 2020-06-09

## 2020-06-09 VITALS
TEMPERATURE: 96.6 F | BODY MASS INDEX: 25.8 KG/M2 | SYSTOLIC BLOOD PRESSURE: 124 MMHG | HEIGHT: 62 IN | WEIGHT: 140.2 LBS | OXYGEN SATURATION: 98 % | DIASTOLIC BLOOD PRESSURE: 78 MMHG | HEART RATE: 70 BPM

## 2020-06-09 DIAGNOSIS — E11.649 TYPE 2 DIABETES MELLITUS WITH HYPOGLYCEMIA WITHOUT COMA, WITHOUT LONG-TERM CURRENT USE OF INSULIN (HCC): Primary | ICD-10-CM

## 2020-06-09 DIAGNOSIS — E78.2 MIXED HYPERLIPIDEMIA: ICD-10-CM

## 2020-06-09 DIAGNOSIS — E03.9 ACQUIRED HYPOTHYROIDISM: ICD-10-CM

## 2020-06-09 DIAGNOSIS — F51.01 PRIMARY INSOMNIA: ICD-10-CM

## 2020-06-09 DIAGNOSIS — I25.10 CORONARY ARTERY DISEASE INVOLVING NATIVE HEART WITHOUT ANGINA PECTORIS, UNSPECIFIED VESSEL OR LESION TYPE: ICD-10-CM

## 2020-06-09 LAB — HBA1C MFR BLD: 7.3 %

## 2020-06-09 PROCEDURE — 83036 HEMOGLOBIN GLYCOSYLATED A1C: CPT | Performed by: FAMILY MEDICINE

## 2020-06-09 PROCEDURE — 99214 OFFICE O/P EST MOD 30 MIN: CPT | Performed by: FAMILY MEDICINE

## 2020-06-09 RX ORDER — ZOLPIDEM TARTRATE 10 MG/1
10 TABLET ORAL NIGHTLY PRN
Qty: 90 TABLET | Refills: 0 | Status: SHIPPED | OUTPATIENT
Start: 2020-06-09 | End: 2021-01-04 | Stop reason: SDUPTHER

## 2020-06-09 NOTE — PATIENT INSTRUCTIONS
Recommend low fat/low calorie diet and exercise greater than 150 minutes of cardio per week.   Continue current treatment plan.  Restart Crestor 10 mg 1 p.o. daily

## 2020-06-09 NOTE — PROGRESS NOTES
Fermin Wilks is a 86 y.o. female.     Vitals:    06/09/20 1029   BP: 124/78   Pulse: 70   Temp: 96.6 °F (35.9 °C)   SpO2: 98%        Chief Complaint   Patient presents with   • Hyperlipidemia     Patient is fasting to have her labs drawn    • Insomnia     Patient is needing her ambien refilled    • Diabetes        History of Present Illness    6-month follow-up on diabetes, hyperlipidemia, hypertension, and insomnia    Last office visit with me at [facility] November 2019 for follow-up labs, medication refills.  At that time no changes were made.  Currently, patient has been doing quite well even despite the pandemic.  However, she tells me she stopped her Crestor several months ago because she thought it was making her left arm only achy.  When she did this the pain did resolve.  She has been on Crestor for approximately 1 to 2 years.  Has never had myalgias or other side effects in the past.  Thus, at this point she is on no lipid medication for over 4 to 5 months.  She continues to try to eat healthy and exercise routinely.    Patient's diabetes has been fairly well controlled with glimepiride 4 mg twice daily.  She tolerates this medication without side effects.  Her blood sugars have been stable, no lows or highs.  Her last A1c November 2019 was 7.4.  No changes were made given her age and other medical conditions.    Patient's hypothyroidism has been well controlled with levothyroxine.  Tolerates medication without side effects.  Last TSH checked November 2019 and within normal limits.  Weight stable    Hypertension well controlled with atenolol.  Tolerates medication without side effects.  Denies chest pain, shortness of air.    Patient's insomnia has been treated with Ambien 10 mg where she takes 1/2-1 whole p.o. nightly times many, many years.  She has been tried on other medications in the past without success.  She tolerates Ambien without side effects.  Needs refill today.    The following  portions of the patient's history were reviewed and updated as appropriate: allergies, current medications, past family history, past medical history, past social history, past surgical history and problem list.    Review of Systems   Constitutional: Negative for unexpected weight gain and unexpected weight loss.   Respiratory: Negative for shortness of breath.    Cardiovascular: Negative for chest pain.   Psychiatric/Behavioral: Positive for sleep disturbance.       Objective   Physical Exam   Constitutional: She appears well-developed.   HENT:   Head: Normocephalic and atraumatic.   Eyes: Pupils are equal, round, and reactive to light. Conjunctivae are normal.   Neck: Normal range of motion. Neck supple. No thyromegaly present.   Cardiovascular: Normal rate, regular rhythm and normal heart sounds.   Pulmonary/Chest: Effort normal and breath sounds normal.   Abdominal: Soft. Bowel sounds are normal. She exhibits no distension. There is no hepatosplenomegaly. There is no tenderness.   Musculoskeletal: She exhibits no edema.   Lymphadenopathy:     She has no cervical adenopathy.   Psychiatric: She has a normal mood and affect. Her behavior is normal. Judgment and thought content normal.   Nursing note and vitals reviewed.       LABS/STUDIES --today hemoglobin A1c 7.3 (previous A1c 7.4)                                 November 2019 CMP, CBC, lipids, TSH within normal limits    Procedures     Assessment/Plan   Lisbet was seen today for hyperlipidemia, insomnia and diabetes.    Diagnoses and all orders for this visit:    Type 2 diabetes mellitus with hypoglycemia without coma, without long-term current use of insulin (CMS/[facility]) --fair control.  Patient's medical history and age no changes will be made to glimepiride.  Continue glimepiride 4 mg 1 p.o. twice daily, will refill upon request.  Also continue with low calorie/low carbohydrate diet and regular cardio exercise greater than 150 minutes weekly  -     POC  Glycosylated Hemoglobin (Hb A1C)    Coronary artery disease involving native heart without angina pectoris, unspecified vessel or lesion type --stable per cardiologist    Acquired hypothyroidism --controlled.  No change to medication.  88 mcg 1 p.o. every morning, will refill upon request    Mixed hyperlipidemia --uncontrolled.  Most likely secondary to patient continuing her Crestor on her own.  I have asked her to restart Crestor 10 mg 1 p.o. daily if she has recurrent myalgias then to call me and we will change to Zetia?  Or possibly pravastatin?  Goal LDL given her diabetes coronary artery disease less than 70    Primary insomnia --stable.  [place] reviewed.  No change to prescription.  Refill Ambien 10 mg patient may take 1/2-1 p.o. nightly as needed, 90 quantity for 3 month supply no refills  -     zolpidem (Ambien) 10 MG tablet; Take 1 tablet by mouth At Night As Needed for Sleep.                 Return in about 4 months (around 10/9/2020) for Medicare Wellness.

## 2020-09-17 RX ORDER — LEVOTHYROXINE SODIUM 88 UG/1
TABLET ORAL
Qty: 90 TABLET | Refills: 1 | Status: SHIPPED | OUTPATIENT
Start: 2020-09-17 | End: 2021-01-04 | Stop reason: SDUPTHER

## 2020-09-25 RX ORDER — GLIMEPIRIDE 4 MG/1
TABLET ORAL
Qty: 180 TABLET | Refills: 3 | Status: SHIPPED | OUTPATIENT
Start: 2020-09-25 | End: 2021-10-04

## 2020-10-23 RX ORDER — ROSUVASTATIN CALCIUM 10 MG/1
TABLET, COATED ORAL
Qty: 90 TABLET | Refills: 0 | Status: SHIPPED | OUTPATIENT
Start: 2020-10-23 | End: 2021-01-04 | Stop reason: SDUPTHER

## 2020-11-04 ENCOUNTER — OFFICE VISIT (OUTPATIENT)
Dept: CARDIOLOGY | Facility: CLINIC | Age: 85
End: 2020-11-04

## 2020-11-04 VITALS
DIASTOLIC BLOOD PRESSURE: 73 MMHG | HEIGHT: 62 IN | BODY MASS INDEX: 25.03 KG/M2 | SYSTOLIC BLOOD PRESSURE: 122 MMHG | HEART RATE: 70 BPM | WEIGHT: 136 LBS

## 2020-11-04 DIAGNOSIS — Z66 DNR (DO NOT RESUSCITATE): ICD-10-CM

## 2020-11-04 DIAGNOSIS — E11.649 TYPE 2 DIABETES MELLITUS WITH HYPOGLYCEMIA WITHOUT COMA, WITHOUT LONG-TERM CURRENT USE OF INSULIN (HCC): ICD-10-CM

## 2020-11-04 DIAGNOSIS — E78.2 MIXED HYPERLIPIDEMIA: ICD-10-CM

## 2020-11-04 DIAGNOSIS — I21.4 NSTEMI (NON-ST ELEVATED MYOCARDIAL INFARCTION) (HCC): Primary | ICD-10-CM

## 2020-11-04 PROBLEM — I24.9 ACUTE CORONARY SYNDROME: Status: RESOLVED | Noted: 2018-04-10 | Resolved: 2020-11-04

## 2020-11-04 PROCEDURE — 93000 ELECTROCARDIOGRAM COMPLETE: CPT | Performed by: INTERNAL MEDICINE

## 2020-11-04 PROCEDURE — 99214 OFFICE O/P EST MOD 30 MIN: CPT | Performed by: INTERNAL MEDICINE

## 2020-11-04 RX ORDER — ATENOLOL 25 MG/1
TABLET ORAL
Qty: 180 TABLET | Refills: 2 | Status: SHIPPED | OUTPATIENT
Start: 2020-11-04 | End: 2021-11-29

## 2020-11-04 NOTE — PROGRESS NOTES
Date of Office Visit: 20  Encounter Provider: David Villalba MD  Place of Service: [facility] CARDIOLOGY  Patient Name: Lisbet Wilks  :1934  [id number]    Chief Complaint   Patient presents with   • Coronary Artery Disease     1 yr follow up   :     HPI: Lisbet Wilks is a 86 y.o. female  this is a lady who came in late with an inferior MI in 2018 and we have managed her conservatively because of her age and her wishes.  We did a stress test and add it showed inferior infarct and no areas of ischemia she had preserved LV function and we've elected to manage her medically.  She has been doing well she atypical chest pain no PND orthopnea edema syncope or palpitations she still gets around pretty well and is pretty happy with her quality of life in the past she was a DNR and she did not want lots of aggressive things done    Past Medical History:   Diagnosis Date   • Benign essential HTN    • CAD (coronary artery disease)    • Diabetes (CMS/HCC)    • Diverticulosis    • Fatigue    • Fatty liver    • GERD (gastroesophageal reflux disease) 4/10/2018   • Hyperlipidemia    • Myocardial infarction (CMS/HCC)    • Palpitations    • Pericardial effusion    • Sleep apnea    • Thyroid disorder        Past Surgical History:   Procedure Laterality Date   • APPENDECTOMY     • CATARACT EXTRACTION     • COLONOSCOPY  07/15/2014    NBIH, tics,    • ENDOSCOPY  07/15/2014    acute gastritis   • HYSTERECTOMY     • THYROID BIOPSY     • TONSILLECTOMY         Social History     Socioeconomic History   • Marital status:      Spouse name: Not on file   • Number of children: Not on file   • Years of education: 12   • Highest education level: Not on file   Occupational History   • Occupation: retired     Tobacco Use   • Smoking status: Former Smoker     Packs/day: 1.00     Years: 31.00     Pack years: 31.00   • Smokeless tobacco: Former User   • Tobacco comment: CAFFEINE USE- 2  CUPS COFFEE DAILY   Substance and Sexual Activity   • Alcohol use: No   • Drug use: No   • Sexual activity: Defer       Family History   Problem Relation Age of Onset   • Colon cancer Maternal Uncle    • Colon cancer Maternal Uncle    • No Known Problems Mother    • No Known Problems Father    • No Known Problems Maternal Grandmother    • No Known Problems Maternal Grandfather    • No Known Problems Paternal Grandmother    • No Known Problems Paternal Grandfather    • Alzheimer's disease Sister        Review of Systems   Constitution: Negative for decreased appetite, fever, malaise/fatigue and weight loss.   HENT: Negative for nosebleeds.    Eyes: Negative for double vision.   Cardiovascular: Negative for chest pain, claudication, cyanosis, dyspnea on exertion, irregular heartbeat, leg swelling, near-syncope, orthopnea, palpitations, paroxysmal nocturnal dyspnea and syncope.   Respiratory: Negative for cough, hemoptysis and shortness of breath.    Hematologic/Lymphatic: Negative for bleeding problem.   Skin: Negative for rash.   Musculoskeletal: Negative for falls and myalgias.   Gastrointestinal: Negative for hematochezia, jaundice, melena, nausea and vomiting.   Genitourinary: Negative for hematuria.   Neurological: Negative for dizziness and seizures.   Psychiatric/Behavioral: Negative for altered mental status and memory loss.       Allergies   Allergen Reactions   • Pioglitazone Rash         Current Outpatient Medications:   •  aspirin 81 MG tablet, Take 81 mg by mouth Daily., Disp: , Rfl:   •  atenolol (TENORMIN) 25 MG tablet, TAKE 1 TABLET TWICE DAILY, Disp: 180 tablet, Rfl: 2  •  glimepiride (AMARYL) 4 MG tablet, TAKE 1 TABLET TWICE DAILY, Disp: 180 tablet, Rfl: 3  •  levothyroxine (SYNTHROID, LEVOTHROID) 88 MCG tablet, TAKE 1 TABLET EVERY DAY, Disp: 90 tablet, Rfl: 1  •  omeprazole (priLOSEC) 20 MG capsule, Take 1 capsule by mouth 2 (Two) Times a Day. (Patient taking differently: Take 20 mg by mouth  "Daily.), Disp: 180 capsule, Rfl: 3  •  rosuvastatin (CRESTOR) 10 MG tablet, TAKE 1 TABLET EVERY DAY, Disp: 90 tablet, Rfl: 0  •  zolpidem (Ambien) 10 MG tablet, Take 1 tablet by mouth At Night As Needed for Sleep., Disp: 90 tablet, Rfl: 0      Objective:     Vitals:    11/04/20 1130   BP: 122/73   BP Location: Right arm   Patient Position: Sitting   Pulse: 70   Weight: 61.7 kg (136 lb)   Height: 157.5 cm (62.01\")     Body mass index is 24.87 kg/m².    Constitutional:       Appearance: Well-developed.   Eyes:      General: No scleral icterus.  HENT:      Head: Normocephalic.   Neck:      Thyroid: No thyromegaly.      Vascular: No JVD.      Lymphadenopathy: No cervical adenopathy.   Pulmonary:      Effort: Pulmonary effort is normal.      Breath sounds: Normal breath sounds. No wheezing. No rales.   Cardiovascular:      Normal rate. Regular rhythm.      No gallop.   Edema:     Peripheral edema absent.   Abdominal:      Palpations: Abdomen is soft.      Tenderness: There is no abdominal tenderness.   Musculoskeletal: Normal range of motion.   Skin:     General: Skin is warm and dry.      Findings: No rash.   Neurological:      Mental Status: Alert and oriented to person, place, and time.           ECG 12 Lead    Date/Time: 11/4/2020 12:08 PM  Performed by: David Villalba MD  Authorized by: David Villalba MD   Comparison: compared with previous ECG   Similar to previous ECG  Rhythm: sinus rhythm  Q waves: II, III and aVF    T inversion: II, III, aVF, V4, V5 and V6    Clinical impression: abnormal EKG             Assessment:       Diagnosis Plan   1. NSTEMI (non-ST elevated myocardial infarction) (CMS/[facility])     2. Type 2 diabetes mellitus with hypoglycemia without coma, without long-term current use of insulin (CMS/[facility])     3. Mixed hyperlipidemia     4. DNR (do not resuscitate)            Plan:       At this point I think she is doing well she is basically asymptomatic she had a little bit of atypical chest pain she " is on decent medical therapy she is 86 years old or not can do a lot of aggressive things but I did tell her if she was getting worse for her starting to have angina we definitely could consider doing a cath on her with a rescue PCI I will have her come back and see Radha or Emilee in a year and then see me in 2 years    As always, it has been a pleasure to participate in your patient's care.      Sincerely,       David Villalba MD

## 2021-01-04 ENCOUNTER — TELEPHONE (OUTPATIENT)
Dept: FAMILY MEDICINE CLINIC | Facility: CLINIC | Age: 86
End: 2021-01-04

## 2021-01-04 ENCOUNTER — TELEMEDICINE (OUTPATIENT)
Dept: FAMILY MEDICINE CLINIC | Facility: CLINIC | Age: 86
End: 2021-01-04

## 2021-01-04 DIAGNOSIS — E11.649 TYPE 2 DIABETES MELLITUS WITH HYPOGLYCEMIA WITHOUT COMA, WITHOUT LONG-TERM CURRENT USE OF INSULIN (HCC): ICD-10-CM

## 2021-01-04 DIAGNOSIS — N30.00 ACUTE CYSTITIS WITHOUT HEMATURIA: Primary | ICD-10-CM

## 2021-01-04 DIAGNOSIS — F51.01 PRIMARY INSOMNIA: ICD-10-CM

## 2021-01-04 DIAGNOSIS — E78.2 MIXED HYPERLIPIDEMIA: ICD-10-CM

## 2021-01-04 DIAGNOSIS — E03.9 ACQUIRED HYPOTHYROIDISM: ICD-10-CM

## 2021-01-04 PROCEDURE — 99214 OFFICE O/P EST MOD 30 MIN: CPT | Performed by: FAMILY MEDICINE

## 2021-01-04 RX ORDER — SULFAMETHOXAZOLE AND TRIMETHOPRIM 800; 160 MG/1; MG/1
1 TABLET ORAL 2 TIMES DAILY
Qty: 14 TABLET | Refills: 0 | Status: SHIPPED | OUTPATIENT
Start: 2021-01-04 | End: 2021-04-07

## 2021-01-04 RX ORDER — LEVOTHYROXINE SODIUM 88 UG/1
88 TABLET ORAL DAILY
Qty: 90 TABLET | Refills: 1 | Status: SHIPPED | OUTPATIENT
Start: 2021-01-04 | End: 2021-06-18

## 2021-01-04 RX ORDER — ROSUVASTATIN CALCIUM 10 MG/1
10 TABLET, COATED ORAL DAILY
Qty: 90 TABLET | Refills: 1 | Status: SHIPPED | OUTPATIENT
Start: 2021-01-04 | End: 2021-11-29

## 2021-01-04 RX ORDER — ZOLPIDEM TARTRATE 10 MG/1
10 TABLET ORAL NIGHTLY PRN
Qty: 90 TABLET | Refills: 0 | Status: SHIPPED | OUTPATIENT
Start: 2021-01-04 | End: 2021-06-08 | Stop reason: SDUPTHER

## 2021-01-04 NOTE — PATIENT INSTRUCTIONS
Start bactrim times 7 days  Get lab work ordered  Recommend low fat/low calorie diet and exercise greater than 150 minutes of cardio per week.   Continue current treatment plan.

## 2021-01-04 NOTE — PROGRESS NOTES
"Fermin Wilks is a 86 y.o. female.     There were no vitals filed for this visit.     Chief Complaint   Patient presents with   • Urinary Tract Infection     having symptoms   • Diabetes     is due for all her labs and check up   • Hypothyroidism   • Hyperlipidemia   • Insomnia        History of Present Illness    This appt was converted to a video visit in accordance with CDC guidelines given the current coronavirus pandemic.     6 month F/u on DM, Hyperlipidemia, Hypothyroidism, Insomnia, and complains of a possible \"UTI\"    LOV with me in June to get est, med refills, and restarted Crestor.   No labs were done then except A1c.   Overall, doing okay all considering     Yet, complains of a possible \"UTI\". Has had a 1 week history of increased urine freq and some burning at end of urination. No blood, N,V, or fever. No frequent history of recurrent UTIs.     Needs several refills today. Marco all meds and compliant with all meds  June A1c was 7.3 no changes were made to amaryl 4 mg BID given age.   Tries to maintain good DM and low chol diet.   Restarted Crestor in June and tolerating for the most part. Will occasionally hold it for a day or two if achy.      Insomnia--usually only takes Ambien 5 mg qhs, may occ take whole 10mg dose. jeffrey shows last refill for qty 90 on 6/18/20.      The following portions of the patient's history were reviewed and updated as appropriate: allergies, current medications, past family history, past medical history, past social history, past surgical history and problem list.    Review of Systems   Constitutional: Negative for unexpected weight gain and unexpected weight loss.   Respiratory: Negative for shortness of breath.    Cardiovascular: Negative for chest pain.       Objective   Physical Exam  Vitals signs and nursing note reviewed.   Constitutional:       General: She is not in acute distress.     Appearance: She is well-developed and normal weight.   HENT:      " Head: Normocephalic and atraumatic.   Pulmonary:      Effort: Pulmonary effort is normal.   Neurological:      Mental Status: She is alert and oriented to person, place, and time.   Psychiatric:         Behavior: Behavior normal.         Thought Content: Thought content normal.         Judgment: Judgment normal.          LABS/STUDIES --June 2020--A1C was 7.3                                  Dec 2019---CMP, lipids, TSH wnl.     Procedures     Assessment/Plan   Diagnoses and all orders for this visit:    1. Acute cystitis without hematuria (Primary) -- clinical Dx.   Start Bactrim DS one po BID times one week    2. Type 2 diabetes mellitus with hypoglycemia without coma, without long-term current use of insulin (CMS/[facility]) --fair control.   Orders placed to recheck A1c if less than 7.5 then no change with Amaryl 4 mg BID, will refill upon request.  Cont with low carb/chol diet and ex   -     Hemoglobin A1c; Future    3. Mixed hyperlipidemia --control?  Order placed to check CMP and lipids  Ideally goal LDL needs to be less than 70 given DM and CAD yet given age and intol to higher dose statins most likely no change with Crestor  Refill Crestor 10 mg a day   -     Comprehensive Metabolic Panel; Future  -     Lipid Panel With LDL / HDL Ratio; Future    4. Acquired hypothyroidism --appears stable.   Recheck TSH, if therapeutic then no change with Synthroid 88 mcg q am  -     TSH; Future    5. Primary insomnia --stable. Honorio reviewed, appropriate. No change with med, may cont with Ambien 10 mg one half po qhs, occ may take one whole pill qhs.   -     zolpidem (Ambien) 10 MG tablet; Take 1 tablet by mouth At Night As Needed for Sleep.  Dispense: 90 tablet; Refill: 0    Other orders  -     sulfamethoxazole-trimethoprim (Bactrim DS) 800-160 MG per tablet; Take 1 tablet by mouth 2 (Two) Times a Day.  Dispense: 14 tablet; Refill: 0  -     levothyroxine (SYNTHROID, LEVOTHROID) 88 MCG tablet; Take 1 tablet by mouth Daily.   Dispense: 90 tablet; Refill: 1  -     rosuvastatin (CRESTOR) 10 MG tablet; Take 1 tablet by mouth Daily.  Dispense: 90 tablet; Refill: 1      My chart video visit time 25 min            Return in about 6 months (around 7/4/2021) for Medicare Wellness.     This was an audio and video enabled telemedicine encounter.

## 2021-01-05 DIAGNOSIS — E11.649 TYPE 2 DIABETES MELLITUS WITH HYPOGLYCEMIA WITHOUT COMA, WITHOUT LONG-TERM CURRENT USE OF INSULIN (HCC): ICD-10-CM

## 2021-01-05 DIAGNOSIS — E78.2 MIXED HYPERLIPIDEMIA: ICD-10-CM

## 2021-01-05 DIAGNOSIS — E03.9 ACQUIRED HYPOTHYROIDISM: ICD-10-CM

## 2021-01-07 LAB
ALBUMIN SERPL-MCNC: 4.3 G/DL (ref 3.6–4.6)
ALBUMIN/GLOB SERPL: 1.6 {RATIO} (ref 1.2–2.2)
ALP SERPL-CCNC: 63 IU/L (ref 39–117)
ALT SERPL-CCNC: 12 IU/L (ref 0–32)
AST SERPL-CCNC: 21 IU/L (ref 0–40)
BILIRUB SERPL-MCNC: 0.9 MG/DL (ref 0–1.2)
BUN SERPL-MCNC: 18 MG/DL (ref 8–27)
BUN/CREAT SERPL: 16 (ref 12–28)
CALCIUM SERPL-MCNC: 9.1 MG/DL (ref 8.7–10.3)
CHLORIDE SERPL-SCNC: 104 MMOL/L (ref 96–106)
CHOLEST SERPL-MCNC: 182 MG/DL (ref 100–199)
CO2 SERPL-SCNC: 20 MMOL/L (ref 20–29)
CREAT SERPL-MCNC: 1.13 MG/DL (ref 0.57–1)
GLOBULIN SER CALC-MCNC: 2.7 G/DL (ref 1.5–4.5)
GLUCOSE SERPL-MCNC: 128 MG/DL (ref 65–99)
HBA1C MFR BLD: 7.5 % (ref 4.8–5.6)
HDLC SERPL-MCNC: 48 MG/DL
LDLC SERPL CALC-MCNC: 108 MG/DL (ref 0–99)
LDLC/HDLC SERPL: 2.3 RATIO (ref 0–3.2)
POTASSIUM SERPL-SCNC: 4.8 MMOL/L (ref 3.5–5.2)
PROT SERPL-MCNC: 7 G/DL (ref 6–8.5)
SODIUM SERPL-SCNC: 137 MMOL/L (ref 134–144)
TRIGL SERPL-MCNC: 148 MG/DL (ref 0–149)
TSH SERPL DL<=0.005 MIU/L-ACNC: 1.4 UIU/ML (ref 0.45–4.5)
VLDLC SERPL CALC-MCNC: 26 MG/DL (ref 5–40)

## 2021-04-07 ENCOUNTER — OFFICE VISIT (OUTPATIENT)
Dept: FAMILY MEDICINE CLINIC | Facility: CLINIC | Age: 86
End: 2021-04-07

## 2021-04-07 VITALS
TEMPERATURE: 97.5 F | HEART RATE: 70 BPM | WEIGHT: 137 LBS | SYSTOLIC BLOOD PRESSURE: 146 MMHG | BODY MASS INDEX: 25.21 KG/M2 | OXYGEN SATURATION: 99 % | DIASTOLIC BLOOD PRESSURE: 70 MMHG | HEIGHT: 62 IN

## 2021-04-07 DIAGNOSIS — R30.0 DYSURIA: Primary | ICD-10-CM

## 2021-04-07 DIAGNOSIS — N39.0 URINARY TRACT INFECTION WITHOUT HEMATURIA, SITE UNSPECIFIED: ICD-10-CM

## 2021-04-07 DIAGNOSIS — R42 DIZZINESS: ICD-10-CM

## 2021-04-07 LAB
BILIRUB BLD-MCNC: NEGATIVE MG/DL
CLARITY, POC: CLEAR
COLOR UR: YELLOW
GLUCOSE UR STRIP-MCNC: ABNORMAL MG/DL
KETONES UR QL: NEGATIVE
LEUKOCYTE EST, POC: ABNORMAL
NITRITE UR-MCNC: NEGATIVE MG/ML
PH UR: 5 [PH] (ref 5–8)
PROT UR STRIP-MCNC: NEGATIVE MG/DL
RBC # UR STRIP: ABNORMAL /UL
SP GR UR: 1 (ref 1–1.03)
UROBILINOGEN UR QL: NORMAL

## 2021-04-07 PROCEDURE — 81003 URINALYSIS AUTO W/O SCOPE: CPT | Performed by: NURSE PRACTITIONER

## 2021-04-07 PROCEDURE — 99213 OFFICE O/P EST LOW 20 MIN: CPT | Performed by: NURSE PRACTITIONER

## 2021-04-07 RX ORDER — NITROFURANTOIN 25; 75 MG/1; MG/1
100 CAPSULE ORAL 2 TIMES DAILY
Qty: 14 CAPSULE | Refills: 0 | Status: SHIPPED | OUTPATIENT
Start: 2021-04-07 | End: 2022-09-16

## 2021-04-07 NOTE — PROGRESS NOTES
"Chief Complaint  Dizziness (Patient has had dizziness for a few months she has already followed up her cardiologist in Nov. She had a UTI in Jan and still hasn't felt right since she says   ( wore mask and goggles) )    Fermin Frausto [place] Wilks presents to [facility]  History of Present Illness  Patient of Dr. Lopes's presenting to the office today with concerns regarding a continuation of a urinary tract infection that she was treated for in January.  She did a telemedicine visit with Dr. Lopes in January and she was treated for urinary tract infection with Bactrim.  She states she took all the medication as directed even though it did cause her to have some nausea.  She states she still has some suprapubic discomfort and pain when urinating.  She is not reporting any fever or back pain at this point.    She also states she has had a dizzy spell \"a few months ago\" she could not exactly remember when but she states she was driving and she started to get dizzy.  It only lasted for a few seconds and she was able to drive her car normally.  Since then she states she has had off and on dizziness but she is a poor historian and does not know how many times or when the last dizzy episode was.  I asked her to think about if she had any dizzy spells around the holidays or since New Year's or since she last spoke with Dr. Lopes on the phone and she was unable to answer.  She states that she is feeling fine today except for the urinary tract infection symptoms.  She states \"I have not been dizzy in a while\".    We talked at length regarding her dizziness and possible reasons for her dizziness.  I offered her to go back to her cardiologist also offered her to have a carotid ultrasound and an echo but patient stated \"I would like to wait on these things\"    Objective   Vital Signs:   /70   Pulse 70   Temp 97.5 °F (36.4 °C)   Ht 157.5 cm (62.01\")   Wt 62.1 kg (137 lb)   " SpO2 99%   BMI 25.05 kg/m²     Physical Exam  Vitals reviewed.   Constitutional:       General: She is not in acute distress.     Appearance: She is well-developed.   HENT:      Head: Normocephalic.   Cardiovascular:      Rate and Rhythm: Normal rate and regular rhythm.      Heart sounds: Normal heart sounds.   Pulmonary:      Effort: Pulmonary effort is normal.      Breath sounds: Normal breath sounds.   Neurological:      Mental Status: She is alert and oriented to person, place, and time.      Gait: Gait normal.   Psychiatric:         Behavior: Behavior normal.         Thought Content: Thought content normal.         Judgment: Judgment normal.        Result Review :   The following data was reviewed by: BISHOP Capps on 04/07/2021:  CMP    CMP 1/6/21   Glucose 128 (A)   BUN 18   Creatinine 1.13 (A)   eGFR Non  Am 44 (A)   eGFR African Am 51 (A)   Sodium 137   Potassium 4.8   Chloride 104   Calcium 9.1   Total Protein 7.0   Albumin 4.3   Globulin 2.7   Total Bilirubin 0.9   Alkaline Phosphatase 63   AST (SGOT) 21   ALT (SGPT) 12   (A) Abnormal value              Lipid Panel    Lipid Panel 1/6/21   Total Cholesterol 182   Triglycerides 148   HDL Cholesterol 48   VLDL Cholesterol 26   LDL Cholesterol  108 (A)   LDL/HDL Ratio 2.3   (A) Abnormal value       Comments are available for some flowsheets but are not being displayed.           TSH    TSH 1/6/21   TSH 1.400                     Assessment and Plan    Diagnoses and all orders for this visit:    1. Dysuria (Primary)  -     POC Urinalysis Dipstick, Automated    2. Urinary tract infection without hematuria, site unspecified  -     Urinalysis With Culture If Indicated - Urine, Clean Catch  -     nitrofurantoin, macrocrystal-monohydrate, (Macrobid) 100 MG capsule; Take 1 capsule by mouth 2 (Two) Times a Day.  Dispense: 14 capsule; Refill: 0    3. Dizziness        Follow Up   No follow-ups on file.  Patient was given instructions and counseling  regarding her condition or for health maintenance advice. Please see specific information pulled into the AVS if appropriate.     I advised her to follow-up about her dizziness if it continues with Dr. Lopes and made sure she did not want to do any of the testing for her dizziness today.  Patient does not want any testing for her dizziness today.    Take antibiotic as directed we will give her call after the culture comes back of her urine.  Patient verbalizes understanding.    Mask and googles worn

## 2021-04-09 ENCOUNTER — TELEPHONE (OUTPATIENT)
Dept: FAMILY MEDICINE CLINIC | Facility: CLINIC | Age: 86
End: 2021-04-09

## 2021-04-09 LAB
APPEARANCE UR: CLEAR
BACTERIA #/AREA URNS HPF: ABNORMAL /HPF
BACTERIA UR CULT: NORMAL
BACTERIA UR CULT: NORMAL
BILIRUB UR QL STRIP: NEGATIVE
COLOR UR: YELLOW
EPI CELLS #/AREA URNS HPF: ABNORMAL /HPF (ref 0–10)
GLUCOSE UR QL: ABNORMAL
HGB UR QL STRIP: NEGATIVE
KETONES UR QL STRIP: NEGATIVE
LEUKOCYTE ESTERASE UR QL STRIP: ABNORMAL
MICRO URNS: ABNORMAL
MUCOUS THREADS URNS QL MICRO: PRESENT /HPF
NITRITE UR QL STRIP: NEGATIVE
PH UR STRIP: 6 [PH] (ref 5–7.5)
PROT UR QL STRIP: NEGATIVE
RBC #/AREA URNS HPF: ABNORMAL /HPF (ref 0–2)
SP GR UR: 1.01 (ref 1–1.03)
URINALYSIS REFLEX: ABNORMAL
UROBILINOGEN UR STRIP-MCNC: 0.2 MG/DL (ref 0.2–1)
WBC #/AREA URNS HPF: ABNORMAL /HPF (ref 0–5)

## 2021-04-09 NOTE — TELEPHONE ENCOUNTER
Caller: Lisbet Wilks    Relationship: Self    Best call back number: [phone]     What orders are you requesting (i.e. lab or imaging): ECHOGRAM    In what timeframe would the patient need to come in: PATIENT WOULD LIKE TO HAVE THIS TEST DONE 6/8/21 IN OFFICE IF POSSIBLE. PLEASE CALL PATIENT AND ADVISE IF THIS CAN BE DONE IN OFFICE OR SOMEWHERE ELSE.      Additional notes: PATIENT HAD AN OFFICE VISIT WITH MANGO JOSUE. PATIENT HAD A URINALYSIS DONE AND RESULTS WERE NORMAL, SO SHE WAS ADVISED TO STOP MEDICATION. BUT, STATED, THE AREA IS STILL ITCHING WHEN WIPED.

## 2021-04-13 NOTE — TELEPHONE ENCOUNTER
When this patient was here Allen asked me about her and told me she asked patient numerous times if she wanted to pursue any testing for this dizziness and she said NO now she is telling me she saw Dr Villalba a while back and told him and he didn't do anything but EKG,  now she wants us to set her up for an ECHO in [facility] she doesn't see us til Kristen please advise what to do

## 2021-04-14 NOTE — TELEPHONE ENCOUNTER
Can we please let her know to discuss with cardiologist or will discuss further at her next office visit since we did not see her for the dizziness

## 2021-04-14 NOTE — TELEPHONE ENCOUNTER
Pt informed. She is not sure if she wants to continue to see Dr. Chairez or not but she will let us know what she decides.

## 2021-06-08 ENCOUNTER — OFFICE VISIT (OUTPATIENT)
Dept: FAMILY MEDICINE CLINIC | Facility: CLINIC | Age: 86
End: 2021-06-08

## 2021-06-08 VITALS
OXYGEN SATURATION: 96 % | BODY MASS INDEX: 25.25 KG/M2 | TEMPERATURE: 97.3 F | DIASTOLIC BLOOD PRESSURE: 76 MMHG | HEART RATE: 63 BPM | SYSTOLIC BLOOD PRESSURE: 126 MMHG | HEIGHT: 62 IN | WEIGHT: 137.2 LBS

## 2021-06-08 DIAGNOSIS — F51.01 PRIMARY INSOMNIA: ICD-10-CM

## 2021-06-08 DIAGNOSIS — E11.649 TYPE 2 DIABETES MELLITUS WITH HYPOGLYCEMIA WITHOUT COMA, WITHOUT LONG-TERM CURRENT USE OF INSULIN (HCC): Primary | ICD-10-CM

## 2021-06-08 DIAGNOSIS — E78.2 MIXED HYPERLIPIDEMIA: ICD-10-CM

## 2021-06-08 DIAGNOSIS — N18.31 STAGE 3A CHRONIC KIDNEY DISEASE (HCC): ICD-10-CM

## 2021-06-08 PROBLEM — N81.6 RECTOCELE: Status: ACTIVE | Noted: 2021-06-08

## 2021-06-08 PROBLEM — N32.81 OAB (OVERACTIVE BLADDER): Status: ACTIVE | Noted: 2021-06-08

## 2021-06-08 LAB — HBA1C MFR BLD: 7.8 %

## 2021-06-08 PROCEDURE — 99214 OFFICE O/P EST MOD 30 MIN: CPT | Performed by: FAMILY MEDICINE

## 2021-06-08 PROCEDURE — 83036 HEMOGLOBIN GLYCOSYLATED A1C: CPT | Performed by: FAMILY MEDICINE

## 2021-06-08 RX ORDER — ZOLPIDEM TARTRATE 10 MG/1
TABLET ORAL
Qty: 90 TABLET | Refills: 0 | Status: SHIPPED | OUTPATIENT
Start: 2021-06-08 | End: 2021-12-01 | Stop reason: SDUPTHER

## 2021-06-08 NOTE — PATIENT INSTRUCTIONS
Needs to decrease sweets  Recommend low fat/low calorie diet and exercise greater than 150 minutes of cardio per week.   Needs to avoid all NSAIDs  Continue current treatment plan.

## 2021-06-08 NOTE — PROGRESS NOTES
Fermin Wilks is a 87 y.o. female.     Vitals:    06/08/21 0927   BP: 126/76   Pulse: 63   Temp: 97.3 °F (36.3 °C)   SpO2: 96%        Chief Complaint   Patient presents with   • Diabetes     3m fu - fasting   • Insomnia     3m fu    • Hyperlipidemia        History of Present Illness    5-month follow-up on DM, insomnia, hyperlipidemia, and CKD    LOV with me in January via telemedicine visit for routine med refills and labs.  No changes were made at that visit, all labs WNL.    Overall, continues to do very well all considering.  No particular complaints today.  Just due for refills and fasting labs.     Compliant with and tolerates all medications without side effects.  Still takes her Ambien 1/2-1 whole pill nightly.  Honorio report reviewed by me today shows last refill for Ambien a quantity of 90 on 1/6/2021.    Admits to eating more sweets during recent weeks.  In particular, just ate a whole tray of banana pudding over the last several weeks.  Noticing her fasting blood sugars are now ranging 140-150.  Although, prior they were in the low 120s.  No reported lows.  Weight stable.  Denies polyuria, polydipsia, SOA, chest pain.    The following portions of the patient's history were reviewed and updated as appropriate: allergies, current medications, past family history, past medical history, past social history, past surgical history and problem list.    Review of Systems   Constitutional: Negative for unexpected weight gain and unexpected weight loss.   Respiratory: Negative for shortness of breath.    Cardiovascular: Negative for chest pain.   Genitourinary: Positive for urgency and urinary incontinence.   Psychiatric/Behavioral: Positive for sleep disturbance.       Objective   Physical Exam  Vitals and nursing note reviewed.   Constitutional:       Appearance: Normal appearance. She is well-developed and normal weight.   HENT:      Head: Normocephalic and atraumatic.      Nose: Nose normal.    Eyes:      Conjunctiva/sclera: Conjunctivae normal.      Pupils: Pupils are equal, round, and reactive to light.   Neck:      Thyroid: No thyromegaly.   Cardiovascular:      Rate and Rhythm: Normal rate and regular rhythm.      Heart sounds: Normal heart sounds. No murmur heard.     Pulmonary:      Effort: Pulmonary effort is normal.      Breath sounds: Normal breath sounds.   Abdominal:      General: Abdomen is flat. Bowel sounds are normal. There is no distension.      Palpations: Abdomen is soft. There is no hepatomegaly, splenomegaly or mass.      Tenderness: There is no abdominal tenderness. There is no guarding or rebound.      Hernia: No hernia is present.   Musculoskeletal:         General: Normal range of motion.      Cervical back: Normal range of motion and neck supple.      Right lower leg: No edema.      Left lower leg: No edema.   Lymphadenopathy:      Cervical: No cervical adenopathy.   Skin:     General: Skin is warm.   Neurological:      General: No focal deficit present.      Mental Status: She is alert.   Psychiatric:         Mood and Affect: Mood normal.         Behavior: Behavior normal.         Thought Content: Thought content normal.         Judgment: Judgment normal.          LABS/STUDIES  -today's A1c 7.8 (previous A1c in January was 7.5)  January 2021 --, GFR 44, TSH normal, normal LFTs    Procedures     Assessment/Plan   Diagnoses and all orders for this visit:    1. Type 2 diabetes mellitus with hypoglycemia without coma, without long-term current use of insulin (CMS/[facility]) (Primary)  -uncontrolled due to more sweets during recent months.  At this time, we will hold on adding any additional medication given she would like the opportunity to improve upon a better diet.  Given her age, history of hypoglycemia, and comorbid conditions will just try to maintain A1c less than 7.5.  Continue/refill Amaryl 4 mg twice daily, will refill upon request  Needs low-carb/low calorie/low  cholesterol diet and increase cardio exercise to greater than 150 minutes weekly  -     POC Glycosylated Hemoglobin (Hb A1C)    2. Primary insomnia  -stable.  Honorio reviewed, appropriate.  No change with prescription.  Refill Ambien as directed below  -     zolpidem (Ambien) 10 MG tablet; Take 1/2  Or 1 whole tablet p.o. nightly as needed  Dispense: 90 tablet; Refill: 0    3. Mixed hyperlipidemia  -uncontrolled  Due to recheck lipids and CMP  Ideally goal LDL needs to be less than 70  However, given myalgias with higher dose statins and advanced age will just continue Crestor at 10 mg every other day, will refill upon request.  Needs low-carb/low calorie/low cholesterol diet and increase cardio exercise to greater than 150 minutes weekly  -     Comprehensive Metabolic Panel  -     Lipid Panel With LDL / HDL Ratio    4. Stage 3a chronic kidney disease (CMS/HCC)  -stable  Due to recheck renal function today  Continue to avoid all NSAIDs  -     Comprehensive Metabolic Panel                 Wore goggles and face mask during entire visit.    Return in about 4 months (around 10/8/2021) for Medicare Wellness, Recheck.

## 2021-06-09 LAB
ALBUMIN SERPL-MCNC: 4.7 G/DL (ref 3.5–5.2)
ALBUMIN/GLOB SERPL: 1.8 G/DL
ALP SERPL-CCNC: 62 U/L (ref 39–117)
ALT SERPL-CCNC: 16 U/L (ref 1–33)
AST SERPL-CCNC: 17 U/L (ref 1–32)
BILIRUB SERPL-MCNC: 1.2 MG/DL (ref 0–1.2)
BUN SERPL-MCNC: 19 MG/DL (ref 8–23)
BUN/CREAT SERPL: 22.1 (ref 7–25)
CALCIUM SERPL-MCNC: 9.8 MG/DL (ref 8.6–10.5)
CHLORIDE SERPL-SCNC: 105 MMOL/L (ref 98–107)
CHOLEST SERPL-MCNC: 228 MG/DL (ref 0–200)
CO2 SERPL-SCNC: 26.4 MMOL/L (ref 22–29)
CREAT SERPL-MCNC: 0.86 MG/DL (ref 0.57–1)
GLOBULIN SER CALC-MCNC: 2.6 GM/DL
GLUCOSE SERPL-MCNC: 141 MG/DL (ref 65–99)
HDLC SERPL-MCNC: 53 MG/DL (ref 40–60)
LDLC SERPL CALC-MCNC: 141 MG/DL (ref 0–100)
LDLC/HDLC SERPL: 2.58 {RATIO}
POTASSIUM SERPL-SCNC: 4.7 MMOL/L (ref 3.5–5.2)
PROT SERPL-MCNC: 7.3 G/DL (ref 6–8.5)
SODIUM SERPL-SCNC: 142 MMOL/L (ref 136–145)
TRIGL SERPL-MCNC: 191 MG/DL (ref 0–150)
VLDLC SERPL CALC-MCNC: 34 MG/DL (ref 5–40)

## 2021-06-18 RX ORDER — LEVOTHYROXINE SODIUM 88 UG/1
TABLET ORAL
Qty: 90 TABLET | Refills: 1 | Status: SHIPPED | OUTPATIENT
Start: 2021-06-18 | End: 2021-11-04

## 2021-06-23 RX ORDER — EZETIMIBE 10 MG/1
10 TABLET ORAL DAILY
Qty: 90 TABLET | Refills: 1 | Status: SHIPPED | OUTPATIENT
Start: 2021-06-23 | End: 2021-12-01 | Stop reason: SDUPTHER

## 2021-10-04 RX ORDER — GLIMEPIRIDE 4 MG/1
TABLET ORAL
Qty: 180 TABLET | Refills: 0 | Status: SHIPPED | OUTPATIENT
Start: 2021-10-04 | End: 2021-11-29

## 2021-11-04 RX ORDER — LEVOTHYROXINE SODIUM 88 UG/1
TABLET ORAL
Qty: 90 TABLET | Refills: 1 | Status: SHIPPED | OUTPATIENT
Start: 2021-11-04 | End: 2021-12-01 | Stop reason: SDUPTHER

## 2021-11-10 ENCOUNTER — OFFICE VISIT (OUTPATIENT)
Dept: CARDIOLOGY | Facility: CLINIC | Age: 86
End: 2021-11-10

## 2021-11-10 VITALS
WEIGHT: 131.8 LBS | OXYGEN SATURATION: 97 % | DIASTOLIC BLOOD PRESSURE: 80 MMHG | SYSTOLIC BLOOD PRESSURE: 132 MMHG | HEIGHT: 62 IN | BODY MASS INDEX: 24.25 KG/M2 | HEART RATE: 75 BPM

## 2021-11-10 DIAGNOSIS — N18.31 STAGE 3A CHRONIC KIDNEY DISEASE (HCC): ICD-10-CM

## 2021-11-10 DIAGNOSIS — I25.10 CORONARY ARTERY DISEASE INVOLVING NATIVE HEART WITHOUT ANGINA PECTORIS, UNSPECIFIED VESSEL OR LESION TYPE: Primary | ICD-10-CM

## 2021-11-10 DIAGNOSIS — E11.649 TYPE 2 DIABETES MELLITUS WITH HYPOGLYCEMIA WITHOUT COMA, WITHOUT LONG-TERM CURRENT USE OF INSULIN (HCC): ICD-10-CM

## 2021-11-10 DIAGNOSIS — E78.2 MIXED HYPERLIPIDEMIA: ICD-10-CM

## 2021-11-10 PROCEDURE — 93000 ELECTROCARDIOGRAM COMPLETE: CPT | Performed by: NURSE PRACTITIONER

## 2021-11-10 PROCEDURE — 99214 OFFICE O/P EST MOD 30 MIN: CPT | Performed by: NURSE PRACTITIONER

## 2021-11-10 NOTE — PROGRESS NOTES
Date of Office Visit: 11/10/2021  Encounter Provider: BISHOP Warner  Place of Service: [facility]  Patient Name: Lisbet Wilks  :1934    Chief Complaint   Patient presents with   • NSTEMI   • Coronary Artery Disease   • Follow-up   :     HPI: Lisbet Wilks is a 87 y.o. female who is a patient of Dr. Villalba and is new to me today.  She had an inferior wall MI in 2018 and was managed conservatively due to her age and her wishes.  She did have a stress test showing an inferior infarct but no ischemia with preserved EF.  She was last in the office a year ago and was doing pretty well.  She comes in today for routine follow-up.    She really looks good for 87 years ago. She is very mobile and does not seem frail. She walks about 2-3 miles everyday. She denies any symptoms with activity. Occasionally she will get a sharp sticking pain in the chest.   Previous testing and notes have been reviewed by me.   Past Medical History:   Diagnosis Date   • Benign essential HTN    • CAD (coronary artery disease)    • Diabetes (HCC)    • Diverticulosis    • Fatigue    • Fatty liver    • GERD (gastroesophageal reflux disease) 4/10/2018   • Hyperlipidemia    • Myocardial infarction (HCC)    • Palpitations    • Pericardial effusion    • Sleep apnea    • Thyroid disorder        Past Surgical History:   Procedure Laterality Date   • APPENDECTOMY     • CATARACT EXTRACTION     • COLONOSCOPY  07/15/2014    CARMELINA, tori,    • ENDOSCOPY  07/15/2014    acute gastritis   • HYSTERECTOMY     • THYROID BIOPSY     • TONSILLECTOMY         Social History     Socioeconomic History   • Marital status:    • Years of education: 12   Tobacco Use   • Smoking status: Former Smoker     Packs/day: 1.00     Years: 31.00     Pack years: 31.00   • Smokeless tobacco: Former User   • Tobacco comment: CAFFEINE USE- 2 CUPS COFFEE DAILY   Substance and Sexual Activity   • Alcohol use: No   • Drug use:  No   • Sexual activity: Defer       Family History   Problem Relation Age of Onset   • Colon cancer Maternal Uncle    • Colon cancer Maternal Uncle    • No Known Problems Mother    • No Known Problems Father    • No Known Problems Maternal Grandmother    • No Known Problems Maternal Grandfather    • No Known Problems Paternal Grandmother    • No Known Problems Paternal Grandfather    • Alzheimer's disease Sister        Review of Systems   Constitutional: Negative for diaphoresis and malaise/fatigue.   Cardiovascular: Positive for chest pain (short sharp sticking pain.). Negative for claudication, dyspnea on exertion, irregular heartbeat, leg swelling, near-syncope, orthopnea, palpitations, paroxysmal nocturnal dyspnea and syncope.   Respiratory: Negative for cough, shortness of breath and sleep disturbances due to breathing.    Musculoskeletal: Negative for falls.   Neurological: Negative for dizziness and weakness.   Psychiatric/Behavioral: Negative for altered mental status and substance abuse.       Allergies   Allergen Reactions   • Pioglitazone Rash         Current Outpatient Medications:   •  aspirin 81 MG tablet, Take 81 mg by mouth Daily., Disp: , Rfl:   •  atenolol (TENORMIN) 25 MG tablet, TAKE 1 TABLET TWICE DAILY (Patient taking differently: 25 mg Daily.), Disp: 180 tablet, Rfl: 2  •  ezetimibe (Zetia) 10 MG tablet, Take 1 tablet by mouth Daily., Disp: 90 tablet, Rfl: 1  •  glimepiride (AMARYL) 4 MG tablet, TAKE 1 TABLET TWICE DAILY, Disp: 180 tablet, Rfl: 0  •  levothyroxine (SYNTHROID, LEVOTHROID) 88 MCG tablet, TAKE 1 TABLET EVERY DAY, Disp: 90 tablet, Rfl: 1  •  nitrofurantoin, macrocrystal-monohydrate, (Macrobid) 100 MG capsule, Take 1 capsule by mouth 2 (Two) Times a Day., Disp: 14 capsule, Rfl: 0  •  omeprazole (priLOSEC) 20 MG capsule, Take 1 capsule by mouth 2 (Two) Times a Day. (Patient taking differently: Take 20 mg by mouth Daily.), Disp: 180 capsule, Rfl: 3  •  rosuvastatin (CRESTOR) 10 MG  "tablet, Take 1 tablet by mouth Daily. (Patient taking differently: Take 10 mg by mouth Every Other Day.), Disp: 90 tablet, Rfl: 1  •  zolpidem (Ambien) 10 MG tablet, Take 1/2  Or 1 whole tablet p.o. nightly as needed, Disp: 90 tablet, Rfl: 0      Objective:     Vitals:    11/10/21 1309   BP: 132/80   BP Location: Right arm   Patient Position: Sitting   Pulse: 75   SpO2: 97%   Weight: 59.8 kg (131 lb 12.8 oz)   Height: 157.5 cm (62\")     Body mass index is 24.11 kg/m².    PHYSICAL EXAM:    Constitutional:       General: Not in acute distress.     Appearance: Normal appearance. Well-developed.   Eyes:      Pupils: Pupils are equal, round, and reactive to light.   HENT:      Head: Normocephalic.   Neck:      Vascular: No carotid bruit or JVD.   Pulmonary:      Effort: Pulmonary effort is normal. No tachypnea.      Breath sounds: Normal breath sounds. No wheezing. No rales.   Cardiovascular:      Normal rate. Regular rhythm.      No gallop.   Pulses:     Intact distal pulses.   Edema:     Peripheral edema absent.   Abdominal:      General: Bowel sounds are normal.      Palpations: Abdomen is soft.      Tenderness: There is no abdominal tenderness.   Musculoskeletal: Normal range of motion.      Cervical back: Normal range of motion and neck supple. No edema. Skin:     General: Skin is warm and dry.   Neurological:      Mental Status: Alert and oriented to person, place, and time.           ECG 12 Lead    Date/Time: 11/10/2021 1:18 PM  Performed by: Emilee Perales APRN  Authorized by: Emilee Perales APRN   Comparison: compared with previous ECG from 11/4/2020  Similar to previous ECG  Rhythm: sinus rhythm  Q waves: III and aVF    T inversion: II, III, aVF, V3, V4, V5 and V6    Clinical impression: abnormal EKG              Assessment:       Diagnosis Plan   1. Coronary artery disease involving native heart without angina pectoris, unspecified vessel or lesion type     2. Mixed hyperlipidemia     3. Type 2 " diabetes mellitus with hypoglycemia without coma, without long-term current use of insulin (HCC)     4. Stage 3a chronic kidney disease (HCC)       Orders Placed This Encounter   Procedures   • ECG 12 Lead     This order was created via procedure documentation     Order Specific Question:   Release to patient     Answer:   Immediate          Plan:       Patient has no complaints of angina symptoms. 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 She does not have the details yet. She is going to let me know the surgery and the surgeons name so I can send clearance. Pending on what type of surgery she would be cleared with moderate but acceptable risk and need to take her atenolol with a sip of water the morning of surgery. She can come back in 1 year.         Your medication list          Accurate as of November 10, 2021  1:26 PM. If you have any questions, ask your nurse or doctor.            CHANGE how you take these medications      Instructions Last Dose Given Next Dose Due   atenolol 25 MG tablet  Commonly known as: TENORMIN  What changed:   · how to take this  · when to take this      TAKE 1 TABLET TWICE DAILY       omeprazole 20 MG capsule  Commonly known as: priLOSEC  What changed: when to take this      Take 1 capsule by mouth 2 (Two) Times a Day.       rosuvastatin 10 MG tablet  Commonly known as: CRESTOR  What changed: when to take this      Take 1 tablet by mouth Daily.          CONTINUE taking these medications      Instructions Last Dose Given Next Dose Due   aspirin 81 MG tablet      Take 81 mg by mouth Daily.       ezetimibe 10 MG tablet  Commonly known as: Zetia      Take 1 tablet by mouth Daily.       glimepiride 4 MG tablet  Commonly known as: AMARYL      TAKE 1 TABLET TWICE DAILY       levothyroxine 88 MCG tablet  Commonly known as: SYNTHROID, LEVOTHROID      TAKE 1 TABLET EVERY DAY       nitrofurantoin (macrocrystal-monohydrate) 100 MG  capsule  Commonly known as: Macrobid      Take 1 capsule by mouth 2 (Two) Times a Day.       zolpidem 10 MG tablet  Commonly known as: Ambien      Take 1/2  Or 1 whole tablet p.o. nightly as needed                As always, it has been a pleasure to participate in your patient's care.      Sincerely,     Emilee ALAS

## 2021-11-29 RX ORDER — ATENOLOL 25 MG/1
TABLET ORAL
Qty: 180 TABLET | Refills: 2 | Status: SHIPPED | OUTPATIENT
Start: 2021-11-29 | End: 2021-12-01 | Stop reason: SDUPTHER

## 2021-11-29 RX ORDER — ROSUVASTATIN CALCIUM 10 MG/1
TABLET, COATED ORAL
Qty: 90 TABLET | Refills: 1 | Status: SHIPPED | OUTPATIENT
Start: 2021-11-29 | End: 2021-12-01 | Stop reason: SDUPTHER

## 2021-11-29 RX ORDER — GLIMEPIRIDE 4 MG/1
TABLET ORAL
Qty: 180 TABLET | Refills: 0 | Status: SHIPPED | OUTPATIENT
Start: 2021-11-29 | End: 2021-12-01 | Stop reason: SDUPTHER

## 2021-12-01 ENCOUNTER — OFFICE VISIT (OUTPATIENT)
Dept: FAMILY MEDICINE CLINIC | Facility: CLINIC | Age: 86
End: 2021-12-01

## 2021-12-01 VITALS
TEMPERATURE: 97.3 F | WEIGHT: 133 LBS | DIASTOLIC BLOOD PRESSURE: 76 MMHG | BODY MASS INDEX: 24.48 KG/M2 | HEIGHT: 62 IN | SYSTOLIC BLOOD PRESSURE: 104 MMHG | HEART RATE: 66 BPM | OXYGEN SATURATION: 97 %

## 2021-12-01 DIAGNOSIS — Z23 NEED FOR TDAP VACCINATION: ICD-10-CM

## 2021-12-01 DIAGNOSIS — F51.01 PRIMARY INSOMNIA: ICD-10-CM

## 2021-12-01 DIAGNOSIS — Z12.31 ENCOUNTER FOR SCREENING MAMMOGRAM FOR BREAST CANCER: ICD-10-CM

## 2021-12-01 DIAGNOSIS — N18.2 STAGE 2 CHRONIC KIDNEY DISEASE: ICD-10-CM

## 2021-12-01 DIAGNOSIS — E78.2 MIXED HYPERLIPIDEMIA: ICD-10-CM

## 2021-12-01 DIAGNOSIS — E11.649 TYPE 2 DIABETES MELLITUS WITH HYPOGLYCEMIA WITHOUT COMA, WITHOUT LONG-TERM CURRENT USE OF INSULIN (HCC): ICD-10-CM

## 2021-12-01 DIAGNOSIS — I25.10 CORONARY ARTERY DISEASE INVOLVING NATIVE HEART WITHOUT ANGINA PECTORIS, UNSPECIFIED VESSEL OR LESION TYPE: ICD-10-CM

## 2021-12-01 DIAGNOSIS — E03.9 ACQUIRED HYPOTHYROIDISM: ICD-10-CM

## 2021-12-01 DIAGNOSIS — Z00.00 ENCOUNTER FOR MEDICARE ANNUAL WELLNESS EXAM: Primary | ICD-10-CM

## 2021-12-01 PROBLEM — N39.0 URINARY TRACT INFECTION WITHOUT HEMATURIA: Status: RESOLVED | Noted: 2021-04-07 | Resolved: 2021-12-01

## 2021-12-01 PROBLEM — R30.0 DYSURIA: Status: RESOLVED | Noted: 2021-04-07 | Resolved: 2021-12-01

## 2021-12-01 PROBLEM — N18.31 STAGE 3A CHRONIC KIDNEY DISEASE (HCC): Status: RESOLVED | Noted: 2021-06-08 | Resolved: 2021-12-01

## 2021-12-01 LAB
EXPIRATION DATE: ABNORMAL
HBA1C MFR BLD: 7 %
Lab: ABNORMAL

## 2021-12-01 PROCEDURE — 99214 OFFICE O/P EST MOD 30 MIN: CPT | Performed by: FAMILY MEDICINE

## 2021-12-01 PROCEDURE — 1159F MED LIST DOCD IN RCRD: CPT | Performed by: FAMILY MEDICINE

## 2021-12-01 PROCEDURE — 83036 HEMOGLOBIN GLYCOSYLATED A1C: CPT | Performed by: FAMILY MEDICINE

## 2021-12-01 PROCEDURE — 3051F HG A1C>EQUAL 7.0%<8.0%: CPT | Performed by: FAMILY MEDICINE

## 2021-12-01 PROCEDURE — 90471 IMMUNIZATION ADMIN: CPT | Performed by: FAMILY MEDICINE

## 2021-12-01 PROCEDURE — 36416 COLLJ CAPILLARY BLOOD SPEC: CPT | Performed by: FAMILY MEDICINE

## 2021-12-01 PROCEDURE — G0439 PPPS, SUBSEQ VISIT: HCPCS | Performed by: FAMILY MEDICINE

## 2021-12-01 PROCEDURE — 1170F FXNL STATUS ASSESSED: CPT | Performed by: FAMILY MEDICINE

## 2021-12-01 PROCEDURE — 90715 TDAP VACCINE 7 YRS/> IM: CPT | Performed by: FAMILY MEDICINE

## 2021-12-01 RX ORDER — ZOLPIDEM TARTRATE 10 MG/1
TABLET ORAL
Qty: 90 TABLET | Refills: 0 | Status: SHIPPED | OUTPATIENT
Start: 2021-12-01 | End: 2022-05-03 | Stop reason: SDUPTHER

## 2021-12-01 RX ORDER — LEVOTHYROXINE SODIUM 88 UG/1
88 TABLET ORAL DAILY
Qty: 90 TABLET | Refills: 1 | Status: SHIPPED | OUTPATIENT
Start: 2021-12-01 | End: 2022-09-16

## 2021-12-01 RX ORDER — GLIMEPIRIDE 4 MG/1
4 TABLET ORAL 2 TIMES DAILY
Qty: 180 TABLET | Refills: 1 | Status: SHIPPED | OUTPATIENT
Start: 2021-12-01 | End: 2022-04-28

## 2021-12-01 RX ORDER — EZETIMIBE 10 MG/1
10 TABLET ORAL DAILY
Qty: 90 TABLET | Refills: 1 | Status: SHIPPED | OUTPATIENT
Start: 2021-12-01 | End: 2022-01-17 | Stop reason: SDUPTHER

## 2021-12-01 RX ORDER — OMEPRAZOLE 20 MG/1
20 CAPSULE, DELAYED RELEASE ORAL DAILY
Qty: 90 CAPSULE | Refills: 1 | Status: SHIPPED | OUTPATIENT
Start: 2021-12-01 | End: 2022-07-22

## 2021-12-01 RX ORDER — ATENOLOL 25 MG/1
25 TABLET ORAL 2 TIMES DAILY
Qty: 180 TABLET | Refills: 2 | Status: SHIPPED | OUTPATIENT
Start: 2021-12-01 | End: 2023-02-03

## 2021-12-01 RX ORDER — ROSUVASTATIN CALCIUM 10 MG/1
10 TABLET, COATED ORAL DAILY
Qty: 90 TABLET | Refills: 1 | Status: SHIPPED | OUTPATIENT
Start: 2021-12-01 | End: 2022-04-28

## 2021-12-01 NOTE — PATIENT INSTRUCTIONS
Recommend low fat/low calorie diet and exercise greater than 150 minutes of cardio per week.   Continue current treatment plan.    Needs info for Living Will  Please get mammogram, to ophthalmologist    Further recommendations to follow

## 2021-12-01 NOTE — PROGRESS NOTES
The ABCs of the Annual Wellness Visit  Subsequent Medicare Wellness Visit    Chief Complaint   Patient presents with   • Medicare Wellness-subsequent     YEARLY WELLNESS PATIENT IS FASTING   • Diabetes   • Hypothyroidism   • Hyperlipidemia       Subjective   History of Present Illness:  Lsibet Wilks is a 87 y.o. female who presents for a Subsequent Medicare Wellness Visit.    Presents for yearly wellness exam  And  6-month follow-up for DM, hyperlipidemia, hypothyroidism, CKD, and chronic insomnia    LOV with me in June for slightly uncontrolled diabetes, uncontrolled hyperlipidemia, and chronic med refills.  Despite slightly uncontrolled A1c of 7.8 and LDL of 141 no medication changes were made due to history of hypoglycemic episodes, intolerance to higher statin dose, and advanced age.  She wanted a chance to try to improve upon a diabetic diet, admitted to eat more sweets prior to that visit.    Overall, doing very well.  She has cut back on her sweets.  States her home blood sugars are generally running tween 80-90.  No reported low blood sugars.  Denies polyuria or polydipsia.  Weight stable.  Sleeping well.    No complaints today.  Just needs all refills and due for 6-month fasting labs.  Compliant with and tolerates all medications without side effects.  Still takes Ambien 1/2-1 whole tablet p.o. nightly as needed chronic insomnia.  Recent visit with cardiologist/NP no medication changes made.  Has an upcoming appointment with uro-GYN/Darinel.    Routine health maintenance/screening test:  Last mammogram greater than 2 years ago  Last DEXA greater than 5 years ago, declines further screening  Covid vaccinations up-to-date, declines influenza vaccine  Former smoker, quit over 20 years ago.  No alcohol use  Greater than 2 years since last ophthalmology visit.  Dental visits up-to-date.  Tries to maintain a diabetic diet and very active lifestyle.  Family history noncontributory    HEALTH RISK  ASSESSMENT    Recent Hospitalizations:  No hospitalization(s) within the last year.    Current Medical Providers:  Patient Care Team:  Aleah Lopes MD as PCP - General  Aleah Lopes MD as PCP - Family Medicine    Smoking Status:  Social History     Tobacco Use   Smoking Status Former Smoker   • Packs/day: 1.00   • Years: 31.00   • Pack years: 31.00   Smokeless Tobacco Former User   Tobacco Comment    CAFFEINE USE- 2 CUPS COFFEE DAILY       Alcohol Consumption:  Social History     Substance and Sexual Activity   Alcohol Use No       Depression Screen:   PHQ-2/PHQ-9 Depression Screening 12/1/2021   Little interest or pleasure in doing things 0   Feeling down, depressed, or hopeless 0   Total Score 0       Fall Risk Screen:  STEADI Fall Risk Assessment was completed, and patient is at LOW risk for falls.Assessment completed on:12/1/2021    Health Habits and Functional and Cognitive Screening:  No flowsheet data found.      Does the patient have evidence of cognitive impairment? No    Asprin use counseling:Does not need ASA (and currently is not on it)    Age-appropriate Screening Schedule:  Refer to the list below for future screening recommendations based on patient's age, sex and/or medical conditions. Orders for these recommended tests are listed in the plan section. The patient has been provided with a written plan.    Health Maintenance   Topic Date Due   • URINE MICROALBUMIN  Never done   • DXA SCAN  Never done   • ZOSTER VACCINE (1 of 2) Never done   • DIABETIC EYE EXAM  08/09/2020   • INFLUENZA VACCINE  08/01/2021   • HEMOGLOBIN A1C  06/01/2022   • LIPID PANEL  06/08/2022   • TDAP/TD VACCINES (2 - Td or Tdap) 12/01/2031          The following portions of the patient's history were reviewed and updated as appropriate: allergies, current medications, past family history, past medical history, past social history, past surgical history and problem list.    Outpatient Medications Prior to Visit    Medication Sig Dispense Refill   • aspirin 81 MG tablet Take 81 mg by mouth Daily.     • atenolol (TENORMIN) 25 MG tablet TAKE 1 TABLET TWICE DAILY 180 tablet 2   • ezetimibe (Zetia) 10 MG tablet Take 1 tablet by mouth Daily. 90 tablet 1   • glimepiride (AMARYL) 4 MG tablet TAKE 1 TABLET TWICE DAILY 180 tablet 0   • levothyroxine (SYNTHROID, LEVOTHROID) 88 MCG tablet TAKE 1 TABLET EVERY DAY 90 tablet 1   • omeprazole (priLOSEC) 20 MG capsule Take 1 capsule by mouth 2 (Two) Times a Day. (Patient taking differently: Take 20 mg by mouth Daily.) 180 capsule 3   • rosuvastatin (CRESTOR) 10 MG tablet TAKE 1 TABLET EVERY DAY 90 tablet 1   • zolpidem (Ambien) 10 MG tablet Take 1/2  Or 1 whole tablet p.o. nightly as needed 90 tablet 0   • nitrofurantoin, macrocrystal-monohydrate, (Macrobid) 100 MG capsule Take 1 capsule by mouth 2 (Two) Times a Day. 14 capsule 0     No facility-administered medications prior to visit.       Patient Active Problem List   Diagnosis   • Type 2 diabetes mellitus with hypoglycemia ([facility])   • NSTEMI (non-ST elevated myocardial infarction) ([facility])   • Pericardial effusion   • Gastroesophageal reflux disease without esophagitis   • Rib fracture   • DNR (do not resuscitate)   • CAD (coronary artery disease)   • Primary insomnia   • Acquired hypothyroidism   • Mixed hyperlipidemia   • Dizziness   • Rectocele   • OAB (overactive bladder)   • Stage 2 chronic kidney disease       Advanced Care Planning:  ACP discussion was held with the patient during this visit. Patient does not have an advance directive, information provided.    Review of Systems   Constitutional: Negative for fever and unexpected weight change.   Respiratory: Negative for cough and shortness of breath.    Cardiovascular: Negative for chest pain.       Compared to one year ago, the patient feels her physical health is the same.  Compared to one year ago, the patient feels her mental health is the same.    Reviewed chart for potential of  "high risk medication in the elderly: yes  Reviewed chart for potential of harmful drug interactions in the elderly:yes    Objective         Vitals:    12/01/21 0936   BP: 104/76   Pulse: 66   Temp: 97.3 °F (36.3 °C)   SpO2: 97%   Weight: 60.3 kg (133 lb)   Height: 157.5 cm (62\")       Body mass index is 24.33 kg/m².  Discussed the patient's BMI with her. The BMI is in the acceptable range.    Physical Exam  Vitals and nursing note reviewed.   Constitutional:       Appearance: Normal appearance. She is well-developed and normal weight.   HENT:      Head: Normocephalic and atraumatic.      Nose: Nose normal.   Eyes:      Conjunctiva/sclera: Conjunctivae normal.      Pupils: Pupils are equal, round, and reactive to light.   Neck:      Thyroid: No thyromegaly.   Cardiovascular:      Rate and Rhythm: Normal rate and regular rhythm.      Heart sounds: Normal heart sounds. No murmur heard.      Pulmonary:      Effort: Pulmonary effort is normal.      Breath sounds: Normal breath sounds.   Abdominal:      General: Abdomen is flat. Bowel sounds are normal. There is no distension.      Palpations: Abdomen is soft. There is no hepatomegaly, splenomegaly or mass.      Tenderness: There is no abdominal tenderness. There is no guarding or rebound.      Hernia: No hernia is present.   Musculoskeletal:         General: Normal range of motion.      Cervical back: Normal range of motion and neck supple.      Right lower leg: No edema.      Left lower leg: No edema.   Feet:      Right foot:      Skin integrity: Skin integrity normal. No ulcer, blister or skin breakdown.      Left foot:      Skin integrity: Skin integrity normal. No ulcer, blister or skin breakdown.      Comments: S/P diabetic foot exam done, WNL  Lymphadenopathy:      Cervical: No cervical adenopathy.   Skin:     General: Skin is warm.   Neurological:      General: No focal deficit present.      Mental Status: She is alert.   Psychiatric:         Mood and Affect: Mood " normal.         Behavior: Behavior normal.         Thought Content: Thought content normal.         Judgment: Judgment normal.       LABS --today's A1c is 7.0 (previous A1c in June was 7.8)    June 2021 --, CMP  Last TSH in January 2021, normal  Lab Results   Component Value Date    HGBA1C 7.0 12/01/2021        Procedures       Assessment/Plan   Medicare Risks and Personalized Health Plan  CMS Preventative Services Quick Reference  Advance Directive Discussion  Breast Cancer/Mammogram Screening  Cardiovascular risk  Dementia/Memory   Depression/Dysphoria  Diabetic Lab Screening   Fall Risk  Glaucoma Risk  Immunizations Discussed/Encouraged (specific immunizations; Tdap, Influenza, Pneumococcal 23, Shingrix and COVID19 )  Osteoporosis Risk  Urinary Incontinence    The above risks/problems have been discussed with the patient.  Pertinent information has been shared with the patient in the After Visit Summary.  Follow up plans and orders are seen below in the Assessment/Plan Section.    Diagnoses and all orders for this visit:    1. Encounter for Medicare annual wellness exam (Primary)  -S/P subsequent  exam done, WNL  All screening up-to-date except for needs CBC, mammogram, DEXA (declines despite recommendations,) Tdap vaccine, influenza vaccine (declines despite recommendations,) and a Living Will (information provided.)  See orders below.  Also, reminded patient to reschedule appointment with ophthalmologist as she is overdue.  Otherwise, keep up the great job with a healthy diabetic diet and regular cardio exercise greater than 150 minutes weekly  -     CBC & Differential    2. Encounter for screening mammogram for breast cancer  -     Mammo Screening Bilateral With CAD; Future    3. Type 2 diabetes mellitus with hypoglycemia without coma, without long-term current use of insulin (HCC)  -controlled.  Continue Amaryl 4 mg 1 p.o. twice daily, refilled today  Needs ophthalmology visit and due for urine  microalbumin creatinine ratio, needing an ACE inhibitor?  -     Microalbumin / Creatinine Urine Ratio - Urine, Clean Catch  -     POC Glycosylated Hemoglobin (Hb A1C)    4. Primary insomnia  -well-controlled.  Honorio reviewed, appropriate.  No change of medication.  Tolerates without side effects.  Understands risks.  Refill Ambien as directed below  -     zolpidem (Ambien) 10 MG tablet; Take 1/2  Or 1 whole tablet p.o. nightly as needed  Dispense: 90 tablet; Refill: 0    5. Mixed hyperlipidemia  -uncontrolled.  Yet, intolerant to higher dose statin.  Thus, no change with medication.  Recheck lipids and CMP  Plan to continue/refill both Zetia 10 mg daily and Crestor 10 mg daily  Needs low-carb/low calorie/low cholesterol diet and increase cardio exercise to greater than 150 minutes weekly  -     Comprehensive Metabolic Panel  -     Lipid Panel With LDL / HDL Ratio    6. Acquired hypothyroidism -appears controlled.  Due to recheck TSH today  If therapeutic then no change with Synthroid 88 mcg 1 p.o. every morning, refilled today  -     TSH    7. Stage 2 chronic kidney disease stable.  Recheck renal function  Continue to avoid NSAIDs    8. Coronary artery disease involving native heart without angina pectoris, unspecified vessel or lesion type  -stable.  Continue with aggressive risk factor control  Continue with ASA 81 mg daily  Continue with follow-up from cardiologist as planned  Continue/refill Tenormin 25 mg 1 p.o. twice daily.    9. Need for Tdap vaccination  -     Tdap Vaccine Greater Than or Equal To 6yo IM    Other orders  -     atenolol (TENORMIN) 25 MG tablet; Take 1 tablet by mouth 2 (Two) Times a Day.  Dispense: 180 tablet; Refill: 2  -     ezetimibe (Zetia) 10 MG tablet; Take 1 tablet by mouth Daily.  Dispense: 90 tablet; Refill: 1  -     glimepiride (AMARYL) 4 MG tablet; Take 1 tablet by mouth 2 (Two) Times a Day.  Dispense: 180 tablet; Refill: 1  -     levothyroxine (SYNTHROID, LEVOTHROID) 88 MCG  tablet; Take 1 tablet by mouth Daily.  Dispense: 90 tablet; Refill: 1  -     omeprazole (priLOSEC) 20 MG capsule; Take 1 capsule by mouth Daily.  Dispense: 90 capsule; Refill: 1  -     rosuvastatin (CRESTOR) 10 MG tablet; Take 1 tablet by mouth Daily.  Dispense: 90 tablet; Refill: 1      Follow Up:  Return in about 6 months (around 6/1/2022) for Recheck.     An After Visit Summary and PPPS were given to the patient.  Wore goggles and face mask during entire visit

## 2021-12-02 LAB
ALBUMIN SERPL-MCNC: 4.4 G/DL (ref 3.6–4.6)
ALBUMIN/CREAT UR: 49 MG/G CREAT (ref 0–29)
ALBUMIN/GLOB SERPL: 1.8 {RATIO} (ref 1.2–2.2)
ALP SERPL-CCNC: 68 IU/L (ref 44–121)
ALT SERPL-CCNC: 12 IU/L (ref 0–32)
AST SERPL-CCNC: 17 IU/L (ref 0–40)
BASOPHILS # BLD AUTO: 0.1 X10E3/UL (ref 0–0.2)
BASOPHILS NFR BLD AUTO: 1 %
BILIRUB SERPL-MCNC: 1.2 MG/DL (ref 0–1.2)
BUN SERPL-MCNC: 16 MG/DL (ref 8–27)
BUN/CREAT SERPL: 20 (ref 12–28)
CALCIUM SERPL-MCNC: 9.6 MG/DL (ref 8.7–10.3)
CHLORIDE SERPL-SCNC: 104 MMOL/L (ref 96–106)
CHOLEST SERPL-MCNC: 160 MG/DL (ref 100–199)
CO2 SERPL-SCNC: 24 MMOL/L (ref 20–29)
CREAT SERPL-MCNC: 0.8 MG/DL (ref 0.57–1)
CREAT UR-MCNC: 109.2 MG/DL
EOSINOPHIL # BLD AUTO: 0.2 X10E3/UL (ref 0–0.4)
EOSINOPHIL NFR BLD AUTO: 2 %
ERYTHROCYTE [DISTWIDTH] IN BLOOD BY AUTOMATED COUNT: 12.9 % (ref 11.7–15.4)
GLOBULIN SER CALC-MCNC: 2.5 G/DL (ref 1.5–4.5)
GLUCOSE SERPL-MCNC: 101 MG/DL (ref 65–99)
HCT VFR BLD AUTO: 45.1 % (ref 34–46.6)
HDLC SERPL-MCNC: 52 MG/DL
HGB BLD-MCNC: 14.6 G/DL (ref 11.1–15.9)
IMM GRANULOCYTES # BLD AUTO: 0 X10E3/UL (ref 0–0.1)
IMM GRANULOCYTES NFR BLD AUTO: 0 %
LDLC SERPL CALC-MCNC: 87 MG/DL (ref 0–99)
LDLC/HDLC SERPL: 1.7 RATIO (ref 0–3.2)
LYMPHOCYTES # BLD AUTO: 2.5 X10E3/UL (ref 0.7–3.1)
LYMPHOCYTES NFR BLD AUTO: 27 %
MCH RBC QN AUTO: 29.1 PG (ref 26.6–33)
MCHC RBC AUTO-ENTMCNC: 32.4 G/DL (ref 31.5–35.7)
MCV RBC AUTO: 90 FL (ref 79–97)
MICROALBUMIN UR-MCNC: 53.8 UG/ML
MONOCYTES # BLD AUTO: 0.7 X10E3/UL (ref 0.1–0.9)
MONOCYTES NFR BLD AUTO: 7 %
NEUTROPHILS # BLD AUTO: 5.7 X10E3/UL (ref 1.4–7)
NEUTROPHILS NFR BLD AUTO: 63 %
PLATELET # BLD AUTO: 248 X10E3/UL (ref 150–450)
POTASSIUM SERPL-SCNC: 4.8 MMOL/L (ref 3.5–5.2)
PROT SERPL-MCNC: 6.9 G/DL (ref 6–8.5)
RBC # BLD AUTO: 5.02 X10E6/UL (ref 3.77–5.28)
SODIUM SERPL-SCNC: 142 MMOL/L (ref 134–144)
TRIGL SERPL-MCNC: 115 MG/DL (ref 0–149)
TSH SERPL DL<=0.005 MIU/L-ACNC: 0.25 UIU/ML (ref 0.45–4.5)
VLDLC SERPL CALC-MCNC: 21 MG/DL (ref 5–40)
WBC # BLD AUTO: 9.1 X10E3/UL (ref 3.4–10.8)

## 2021-12-15 DIAGNOSIS — Z12.31 ENCOUNTER FOR SCREENING MAMMOGRAM FOR BREAST CANCER: ICD-10-CM

## 2021-12-16 RX ORDER — LEVOTHYROXINE SODIUM 0.07 MG/1
75 TABLET ORAL DAILY
Qty: 90 TABLET | Refills: 3 | Status: SHIPPED | OUTPATIENT
Start: 2021-12-16 | End: 2021-12-27 | Stop reason: SDUPTHER

## 2021-12-16 RX ORDER — LISINOPRIL 5 MG/1
5 TABLET ORAL DAILY
Qty: 90 TABLET | Refills: 3 | Status: SHIPPED | OUTPATIENT
Start: 2021-12-16 | End: 2021-12-27 | Stop reason: SDUPTHER

## 2021-12-27 ENCOUNTER — TELEPHONE (OUTPATIENT)
Dept: FAMILY MEDICINE CLINIC | Facility: CLINIC | Age: 86
End: 2021-12-27

## 2021-12-27 RX ORDER — LEVOTHYROXINE SODIUM 0.07 MG/1
75 TABLET ORAL DAILY
Qty: 90 TABLET | Refills: 3 | Status: SHIPPED | OUTPATIENT
Start: 2021-12-27 | End: 2022-09-21

## 2021-12-27 RX ORDER — LISINOPRIL 5 MG/1
5 TABLET ORAL DAILY
Qty: 90 TABLET | Refills: 3 | Status: SHIPPED | OUTPATIENT
Start: 2021-12-27 | End: 2022-09-21

## 2021-12-27 NOTE — TELEPHONE ENCOUNTER
PATIENT CALLED STATING THAT SHE WAS SUPPOSED TO GET MEDICATION CALLED IN TO [facility] ORDER [facility]    SOMETHING FOR HER KIDNEYS AND THE CHANGE IN DOSE FOR SYNTHROID     NEEDS 90 DAY SUPPLY              [facility] - [place], [place] - [address]  - [phone]  - [phone]   [phone]     PLEASE ADVISE   Efe, Lisbet HASSAN (Self) [phone] (H)

## 2022-01-17 RX ORDER — EZETIMIBE 10 MG/1
10 TABLET ORAL DAILY
Qty: 90 TABLET | Refills: 1 | Status: SHIPPED | OUTPATIENT
Start: 2022-01-17 | End: 2022-01-19 | Stop reason: SDUPTHER

## 2022-01-19 RX ORDER — EZETIMIBE 10 MG/1
10 TABLET ORAL DAILY
Qty: 90 TABLET | Refills: 1 | Status: SHIPPED | OUTPATIENT
Start: 2022-01-19 | End: 2022-09-14

## 2022-04-25 ENCOUNTER — TELEPHONE (OUTPATIENT)
Dept: FAMILY MEDICINE CLINIC | Facility: CLINIC | Age: 87
End: 2022-04-25

## 2022-04-25 DIAGNOSIS — F51.01 PRIMARY INSOMNIA: ICD-10-CM

## 2022-04-25 NOTE — TELEPHONE ENCOUNTER
Rx Refill Note  Requested Prescriptions     Pending Prescriptions Disp Refills   • rosuvastatin (CRESTOR) 10 MG tablet [Pharmacy Med Name: ROSUVASTATIN CALCIUM 10 MG Tablet] 90 tablet 1     Sig: TAKE 1 TABLET EVERY DAY   • glimepiride (AMARYL) 4 MG tablet [Pharmacy Med Name: GLIMEPIRIDE 4 MG Tablet] 180 tablet 1     Sig: TAKE 1 TABLET TWICE DAILY   • zolpidem (AMBIEN) 10 MG tablet [Pharmacy Med Name: ZOLPIDEM TARTRATE 10 MG Tablet] 90 tablet 0     Sig: TAKE 1/2 OR 1 TABLET EVERY NIGHT AS NEEDED      Last office visit with prescribing clinician: 12/1/2021      Next office visit with prescribing clinician: 5/3/2022            Prachi Craven MA/LMR  04/25/22, 14:05 EDT

## 2022-04-25 NOTE — TELEPHONE ENCOUNTER
Called to reschedule pt from 5/13/22 due to provider being out of office.  Could not reach pt. Left VM to call back.  Unable to leave [employer] message.

## 2022-04-26 ENCOUNTER — TELEPHONE (OUTPATIENT)
Dept: FAMILY MEDICINE CLINIC | Facility: CLINIC | Age: 87
End: 2022-04-26

## 2022-04-28 RX ORDER — GLIMEPIRIDE 4 MG/1
TABLET ORAL
Qty: 180 TABLET | Refills: 1 | Status: SHIPPED | OUTPATIENT
Start: 2022-04-28 | End: 2023-02-03

## 2022-04-28 RX ORDER — ROSUVASTATIN CALCIUM 10 MG/1
TABLET, COATED ORAL
Qty: 90 TABLET | Refills: 1 | Status: SHIPPED | OUTPATIENT
Start: 2022-04-28

## 2022-04-28 RX ORDER — ZOLPIDEM TARTRATE 10 MG/1
TABLET ORAL
Qty: 90 TABLET | Refills: 0 | OUTPATIENT
Start: 2022-04-28

## 2022-05-03 ENCOUNTER — OFFICE VISIT (OUTPATIENT)
Dept: FAMILY MEDICINE CLINIC | Facility: CLINIC | Age: 87
End: 2022-05-03

## 2022-05-03 VITALS
DIASTOLIC BLOOD PRESSURE: 70 MMHG | OXYGEN SATURATION: 98 % | TEMPERATURE: 98.2 F | HEART RATE: 70 BPM | WEIGHT: 133.8 LBS | SYSTOLIC BLOOD PRESSURE: 110 MMHG | BODY MASS INDEX: 24.62 KG/M2 | HEIGHT: 62 IN

## 2022-05-03 DIAGNOSIS — E03.9 ACQUIRED HYPOTHYROIDISM: ICD-10-CM

## 2022-05-03 DIAGNOSIS — F51.01 PRIMARY INSOMNIA: ICD-10-CM

## 2022-05-03 DIAGNOSIS — N18.2 STAGE 2 CHRONIC KIDNEY DISEASE: ICD-10-CM

## 2022-05-03 DIAGNOSIS — E11.9 TYPE 2 DIABETES MELLITUS WITHOUT COMPLICATION, WITHOUT LONG-TERM CURRENT USE OF INSULIN: Primary | ICD-10-CM

## 2022-05-03 DIAGNOSIS — E78.2 MIXED HYPERLIPIDEMIA: ICD-10-CM

## 2022-05-03 LAB
EXPIRATION DATE: ABNORMAL
HBA1C MFR BLD: 6.2 %
Lab: ABNORMAL

## 2022-05-03 PROCEDURE — 3044F HG A1C LEVEL LT 7.0%: CPT | Performed by: FAMILY MEDICINE

## 2022-05-03 PROCEDURE — 99214 OFFICE O/P EST MOD 30 MIN: CPT | Performed by: FAMILY MEDICINE

## 2022-05-03 PROCEDURE — 83036 HEMOGLOBIN GLYCOSYLATED A1C: CPT | Performed by: FAMILY MEDICINE

## 2022-05-03 RX ORDER — HYDROXYZINE HYDROCHLORIDE 25 MG/1
TABLET, FILM COATED ORAL
Qty: 90 TABLET | Refills: 1 | Status: SHIPPED | OUTPATIENT
Start: 2022-05-03 | End: 2022-09-21

## 2022-05-03 RX ORDER — ZOLPIDEM TARTRATE 10 MG/1
TABLET ORAL
Qty: 90 TABLET | Refills: 0 | Status: SHIPPED | OUTPATIENT
Start: 2022-05-03 | End: 2022-07-22

## 2022-05-03 NOTE — PATIENT INSTRUCTIONS
Get lab work done within the next 2 weeks    May start Vistaril/hydroxyzine 25 mg 1 p.o. nightly    Continue current treatment plan.

## 2022-05-03 NOTE — PROGRESS NOTES
Chief Complaint  Insomnia (MED REFILLS), Diabetes (NO RECENT LABS NOT FASTING LATE IN DAY UNABLE TO DO), Hyperlipidemia, Hypertension, and Hypothyroidism     6-month follow-up for DM, HTN, hyperlipidemia, hypothyroidism, and chronic insomnia    LOV with me in early December for wellness exam, chronic med refills, and uncontrolled hypothyroidism  -- -Screening tests were updated.  Chronic med refills done.  -- Synthroid was decreased to 75 mcg every morning due to a suppressed TSH  -- Lisinopril 5 mg daily was added due to a positive urine microalbumin.    Overall, continues to do very well.  She has been compliant with all the above medication changes and tolerating without side effects.  Continues to maintain a healthy and active lifestyle.  Blood sugars have been running very good.  Fasting blood sugars generally running between 70-80 very consistently.  No reported highs or lows.  Denies polyuria, polydipsia.  Weight stable.    Only complaint today is --still issue with chronic insomnia.  She is trying really hard to decrease her Ambien to 5 mg nightly; however, this does not seem to work.  She may start off taking half an Ambien, but then around 1:30 AM she needs to get up and take the other half of the pill.  Unsure if she has tried other medications in past.    Uncertain about needed refills today?  Except she knows she needs her Ambien, uses mail order service.  Due for 6-month fasting labs.  However lab is closed now, late day appointment.  Compliant with and tolerates all medications without side effects.    Review of Systems   Constitutional: Negative for fever and unexpected weight change.   Respiratory: Negative for cough and shortness of breath.    Cardiovascular: Negative for chest pain.        Subjective          Lisbet J Sheets [facility] to [facility]    Objective   Vital Signs:   Vitals:    05/03/22 1610   BP: 110/70   BP Location: Left arm   Patient Position: Sitting  "  Cuff Size: Adult   Pulse: 70   Temp: 98.2 °F (36.8 °C)   SpO2: 98%   Weight: 60.7 kg (133 lb 12.8 oz)   Height: 157.5 cm (62\")      Physical Exam  Vitals and nursing note reviewed.   Constitutional:       Appearance: Normal appearance. She is well-developed and normal weight.   HENT:      Head: Normocephalic and atraumatic.      Nose: Nose normal.   Eyes:      Conjunctiva/sclera: Conjunctivae normal.      Pupils: Pupils are equal, round, and reactive to light.   Neck:      Thyroid: No thyromegaly.   Cardiovascular:      Rate and Rhythm: Normal rate and regular rhythm.      Heart sounds: Normal heart sounds. No murmur heard.  Pulmonary:      Effort: Pulmonary effort is normal.      Breath sounds: Normal breath sounds.   Abdominal:      General: Abdomen is flat. Bowel sounds are normal. There is no distension.      Palpations: Abdomen is soft. There is no hepatomegaly, splenomegaly or mass.      Tenderness: There is no abdominal tenderness. There is no guarding or rebound.      Hernia: No hernia is present.   Musculoskeletal:         General: Normal range of motion.      Cervical back: Normal range of motion and neck supple.      Right lower leg: No edema.      Left lower leg: No edema.   Lymphadenopathy:      Cervical: No cervical adenopathy.   Skin:     General: Skin is warm.   Neurological:      General: No focal deficit present.      Mental Status: She is alert.   Psychiatric:         Mood and Affect: Mood normal.         Behavior: Behavior normal.         Thought Content: Thought content normal.         Judgment: Judgment normal.        Result Review :     Common labs    Common Labsle 6/8/21 6/8/21 6/8/21 12/1/21 12/1/21 12/1/21 12/1/21 12/1/21 5/3/22    0948 1018 1018 1010 1018 1018 1018 1058    Glucose  141 (A)    101 (A)      BUN  19    16      Creatinine  0.86    0.80      eGFR Non  Am  62    67      eGFR  Am  76    77      Sodium  142    142      Potassium  4.7    4.8      Chloride  105    " 104      Calcium  9.8    9.6      Total Protein  7.3    6.9      Albumin  4.70    4.4      Total Bilirubin  1.2    1.2      Alkaline Phosphatase  62    68      AST (SGOT)  17    17      ALT (SGPT)  16    12      WBC     9.1       Hemoglobin     14.6       Hematocrit     45.1       Platelets     248       Total Cholesterol   228 (A)    160     Triglycerides   191 (A)    115     HDL Cholesterol   53    52     LDL Cholesterol    141 (A)    87     Hemoglobin A1C 7.8   7.0     6.2   Microalbumin, Urine        53.8    (A) Abnormal value       Comments are available for some flowsheets but are not being displayed.           TSH    TSH 12/1/21   TSH 0.247 (A)   (A) Abnormal value            A1C Last 3 Results    HGBA1C Last 3 Results 6/8/21 12/1/21 5/3/22   Hemoglobin A1C 7.8 7.0 6.2           Microalbumin    Microalbumin 12/1/21   Microalbumin, Urine 53.8             No results found for: ANADIRECT, FERRITIN, TESTOSTEROTT, [id number], FOLATE, RF, BNP            Assessment and Plan    Diagnoses and all orders for this visit:    1. Type 2 diabetes mellitus without complication, without long-term current use of insulin (HCC) (Primary)  -well-controlled, A1c 6.2.  At this time, no change with medication.  Plan to continue Amaryl 4 mg twice daily.  May continue to monitor blood sugars, if hypoglycemic episodes then may decrease her Amaryl to 4 mg daily.  Continue with a healthy diabetic diet and regular cardio exercise.  -     POC Glycosylated Hemoglobin (Hb A1C)    2. Acquired hypothyroidism  -uncontrolled at last visit, overactive  Hopefully improved since decreasing the dose of Synthroid from 88 mcg to 75 mcg at last visit/December 2021.  Orders placed to recheck TSH in the near future, will the next 1 to 2 weeks.  If therapeutic plan to continue Synthroid at 75 mcg 1 p.o. every morning.  -     TSH; Future    3. Mixed hyperlipidemia  -fair control.  Orders placed to recheck lipids and CMP  Ideally goal LDL needs to be less  than 70 given history of CAD.  However intolerant to higher dose statins.  Thus we will plan to continue Zetia 10 mg daily and Crestor 10 mg daily.  Will refill upon request.  -     Comprehensive Metabolic Panel; Future  -     Lipid Panel With LDL / HDL Ratio; Future    4. Stage 2 chronic kidney disease  -stable.  Plan to recheck renal function, continue to avoid NSAIDs  -     Comprehensive Metabolic Panel; Future    5. Primary insomnia  -uncontrolled.  She is in agreement to trying to add Vistaril 25 mg 1 p.o. nightly.  May continue/refill Ambien as directed below for 90-day supply.  Hopefully if Vistaril is effective then she can continue to decrease her dose and reliance upon Ambien.  Honorio report reviewed, appropriate.  Very low risk patient.  -     zolpidem (Ambien) 10 MG tablet; Take 1/2  Or 1 whole tablet p.o. nightly as needed  Dispense: 90 tablet; Refill: 0    Other orders  -     hydrOXYzine (ATARAX) 25 MG tablet; ONE AT BED  Dispense: 90 tablet; Refill: 1    If all labs WNL and doing well then may follow-up between 4 and 6 months, depending on needed refill for Ambien?        Follow Up   Return in about 4 months (around 9/3/2022) for Recheck.  Patient was given instructions and counseling regarding her condition or for health maintenance advice. Please see specific information pulled into the AVS if appropriate.

## 2022-05-10 LAB
ALBUMIN SERPL-MCNC: 4.5 G/DL (ref 3.6–4.6)
ALBUMIN/GLOB SERPL: 1.7 {RATIO} (ref 1.2–2.2)
ALP SERPL-CCNC: 65 IU/L (ref 44–121)
ALT SERPL-CCNC: 14 IU/L (ref 0–32)
AST SERPL-CCNC: 18 IU/L (ref 0–40)
BILIRUB SERPL-MCNC: 0.7 MG/DL (ref 0–1.2)
BUN SERPL-MCNC: 17 MG/DL (ref 8–27)
BUN/CREAT SERPL: 20 (ref 12–28)
CALCIUM SERPL-MCNC: 9.5 MG/DL (ref 8.7–10.3)
CHLORIDE SERPL-SCNC: 105 MMOL/L (ref 96–106)
CHOLEST SERPL-MCNC: 152 MG/DL (ref 100–199)
CO2 SERPL-SCNC: 24 MMOL/L (ref 20–29)
CREAT SERPL-MCNC: 0.87 MG/DL (ref 0.57–1)
EGFRCR SERPLBLD CKD-EPI 2021: 64 ML/MIN/1.73
GLOBULIN SER CALC-MCNC: 2.6 G/DL (ref 1.5–4.5)
GLUCOSE SERPL-MCNC: 102 MG/DL (ref 65–99)
HDLC SERPL-MCNC: 51 MG/DL
LDLC SERPL CALC-MCNC: 79 MG/DL (ref 0–99)
LDLC/HDLC SERPL: 1.5 RATIO (ref 0–3.2)
POTASSIUM SERPL-SCNC: 5.2 MMOL/L (ref 3.5–5.2)
PROT SERPL-MCNC: 7.1 G/DL (ref 6–8.5)
SODIUM SERPL-SCNC: 143 MMOL/L (ref 134–144)
TRIGL SERPL-MCNC: 121 MG/DL (ref 0–149)
TSH SERPL DL<=0.005 MIU/L-ACNC: 1.27 UIU/ML (ref 0.45–4.5)
VLDLC SERPL CALC-MCNC: 22 MG/DL (ref 5–40)

## 2022-05-13 ENCOUNTER — TELEPHONE (OUTPATIENT)
Dept: FAMILY MEDICINE CLINIC | Facility: CLINIC | Age: 87
End: 2022-05-13

## 2022-05-13 NOTE — TELEPHONE ENCOUNTER
Caller: Julia Wilks    Relationship: Emergency Contact    Best call back number: [phone]    What form or medical record are you requesting: [facility] PAPERWORK       How would you like to receive the form or medical records (pick-up, mail, fax): FAX   If fax, what is the fax number: [phone]    Timeframe paperwork needed: ASAP       Additional notes: PATIENT CALLED AND STATED THE [facility] PAPERWORK IS NEEDING THE REST OF THE PAPERWORK FILLED OUT. QUESTION 2, TREATMENT NEEDED FOR PATIENT,AMOUNT OF LEAVE NEEDED,

## 2022-05-16 NOTE — TELEPHONE ENCOUNTER
fmla paperwork is scanned in chart. Left message for pt to call back to see exactly what needs to be done

## 2022-05-16 NOTE — TELEPHONE ENCOUNTER
FMLA paper work needs to be updated information is written on the forms. The forms are in [employer] folder up front

## 2022-06-02 ENCOUNTER — TELEPHONE (OUTPATIENT)
Dept: FAMILY MEDICINE CLINIC | Facility: CLINIC | Age: 87
End: 2022-06-02

## 2022-06-02 NOTE — TELEPHONE ENCOUNTER
Pt's daughter called regarding [facility] paperwork.  I do see that it was faxed, however she is stating that her job is telling her she needs a revision for estimated days and hours that she will need to be with her mother, if she is home sick or needs to go to a DrErik appt or have a procedure. Pt is requesting the [facility] paperwork be refaxed with the new information.

## 2022-06-02 NOTE — TELEPHONE ENCOUNTER
Left message with patient, advised Julia to call office and to speak with Emil in regards to new FMLA info.

## 2022-07-21 DIAGNOSIS — F51.01 PRIMARY INSOMNIA: ICD-10-CM

## 2022-07-22 RX ORDER — OMEPRAZOLE 20 MG/1
CAPSULE, DELAYED RELEASE ORAL
Qty: 90 CAPSULE | Refills: 1 | Status: SHIPPED | OUTPATIENT
Start: 2022-07-22

## 2022-07-24 RX ORDER — ZOLPIDEM TARTRATE 10 MG/1
TABLET ORAL
Qty: 30 TABLET | Refills: 1 | Status: SHIPPED | OUTPATIENT
Start: 2022-07-24 | End: 2022-09-16 | Stop reason: SDUPTHER

## 2022-09-14 RX ORDER — EZETIMIBE 10 MG/1
TABLET ORAL
Qty: 90 TABLET | Refills: 1 | Status: SHIPPED | OUTPATIENT
Start: 2022-09-14

## 2022-09-16 ENCOUNTER — OFFICE VISIT (OUTPATIENT)
Dept: FAMILY MEDICINE CLINIC | Facility: CLINIC | Age: 87
End: 2022-09-16

## 2022-09-16 VITALS
WEIGHT: 131.6 LBS | BODY MASS INDEX: 24.22 KG/M2 | TEMPERATURE: 97.5 F | SYSTOLIC BLOOD PRESSURE: 140 MMHG | DIASTOLIC BLOOD PRESSURE: 80 MMHG | HEIGHT: 62 IN | HEART RATE: 90 BPM | OXYGEN SATURATION: 98 %

## 2022-09-16 DIAGNOSIS — F51.01 PRIMARY INSOMNIA: Primary | ICD-10-CM

## 2022-09-16 DIAGNOSIS — N81.6 RECTOCELE: ICD-10-CM

## 2022-09-16 DIAGNOSIS — E11.9 TYPE 2 DIABETES MELLITUS WITHOUT COMPLICATION, WITHOUT LONG-TERM CURRENT USE OF INSULIN: ICD-10-CM

## 2022-09-16 LAB
EXPIRATION DATE: ABNORMAL
HBA1C MFR BLD: 6.7 %
Lab: ABNORMAL

## 2022-09-16 PROCEDURE — 36416 COLLJ CAPILLARY BLOOD SPEC: CPT | Performed by: FAMILY MEDICINE

## 2022-09-16 PROCEDURE — 83036 HEMOGLOBIN GLYCOSYLATED A1C: CPT | Performed by: FAMILY MEDICINE

## 2022-09-16 PROCEDURE — 99214 OFFICE O/P EST MOD 30 MIN: CPT | Performed by: FAMILY MEDICINE

## 2022-09-16 PROCEDURE — 3044F HG A1C LEVEL LT 7.0%: CPT | Performed by: FAMILY MEDICINE

## 2022-09-16 RX ORDER — TRAZODONE HYDROCHLORIDE 100 MG/1
100 TABLET ORAL NIGHTLY
Qty: 90 TABLET | Refills: 1 | Status: SHIPPED | OUTPATIENT
Start: 2022-09-16 | End: 2023-02-14

## 2022-09-16 RX ORDER — ZOLPIDEM TARTRATE 10 MG/1
TABLET ORAL
Qty: 90 TABLET | Refills: 0 | Status: SHIPPED | OUTPATIENT
Start: 2022-09-16 | End: 2023-01-17 | Stop reason: SDUPTHER

## 2022-09-16 NOTE — PATIENT INSTRUCTIONS
Continue current treatment plan.     NEEDS NOTE SAYING SHE CAME TO DOCTOR TODAY ---FOR DAUGHTER    Hold hydroxyzine, start trazodone 1/2 tablet nightly x1 week, may increase to 1 tablet nightly for insomnia.  If this is working then please continue to decrease reliance upon Ambien.    If not working, then go back to the hydroxyzine as planned

## 2022-09-16 NOTE — PROGRESS NOTES
"Chief Complaint  Diabetes (Check up medication refills no changes last visit patient is fasting), Insomnia, Hyperlipidemia, and Hypothyroidism     4-month follow-up for chronic insomnia and DM    LOV with me in May for chronic med refills with labs.  -- All labs WNL, no changes made.  -- Yet, Vistaril 25 mg was added for her chronic insomnia, with hopes to decrease her reliance upon Ambien.    Overall, continues to do well.  No recent ER visits, hospitalizations, or falls.  She does believe the new medication/Vistaril is helping with her chronic insomnia but still needs to take Ambien 5 mg nightly.  Per patient report, not needing to take a whole tablet/Ambien 10 mg for insomnia any longer.  Weight remains stable.  Appetite good.    No particular complaints or concerns today.  Uncertain about lab work?  Compliant with and tolerates all medications without side effects.  She is having a hard time getting back in touch with her urogynecologist/Dr. Tena regarding her pelvic prolapse and rectocele surgery.    Review of Systems   Constitutional: Negative for fever and unexpected weight change.   Respiratory: Negative for cough and shortness of breath.    Cardiovascular: Negative for chest pain.        Subjective          Lisbet J Sheets presents to [facility]    Objective   Vital Signs:   Vitals:    09/16/22 0900   BP: 140/80   BP Location: Left arm   Patient Position: Sitting   Cuff Size: Adult   Pulse: 90   Temp: 97.5 °F (36.4 °C)   SpO2: 98%   Weight: 59.7 kg (131 lb 9.6 oz)   Height: 157.5 cm (62\")      Body mass index is 24.07 kg/m².   Physical Exam  Vitals and nursing note reviewed.   Constitutional:       Appearance: Normal appearance. She is well-developed.   HENT:      Head: Normocephalic and atraumatic.      Nose: Nose normal.   Eyes:      Conjunctiva/sclera: Conjunctivae normal.      Pupils: Pupils are equal, round, and reactive to light.   Neck:      Thyroid: No thyromegaly. "   Cardiovascular:      Rate and Rhythm: Normal rate and regular rhythm.      Heart sounds: Normal heart sounds. No murmur heard.  Pulmonary:      Effort: Pulmonary effort is normal.      Breath sounds: Normal breath sounds.   Abdominal:      General: Abdomen is flat. Bowel sounds are normal. There is no distension.      Palpations: Abdomen is soft. There is no hepatomegaly, splenomegaly or mass.      Tenderness: There is no abdominal tenderness. There is no guarding or rebound.      Hernia: No hernia is present.   Musculoskeletal:         General: Normal range of motion.      Cervical back: Normal range of motion and neck supple.      Right lower leg: No edema.      Left lower leg: No edema.   Lymphadenopathy:      Cervical: No cervical adenopathy.   Skin:     General: Skin is warm.   Neurological:      General: No focal deficit present.      Mental Status: She is alert.   Psychiatric:         Mood and Affect: Mood normal.         Behavior: Behavior normal.         Thought Content: Thought content normal.         Judgment: Judgment normal.        Result Review :     Common labs    Common Labsle 5/3/22 5/9/22 5/9/22 9/16/22     0858 0858    Glucose  102 (A)     BUN  17     Creatinine  0.87     Sodium  143     Potassium  5.2     Chloride  105     Calcium  9.5     Total Protein  7.1     Albumin  4.5     Total Bilirubin  0.7     Alkaline Phosphatase  65     AST (SGOT)  18     ALT (SGPT)  14     Total Cholesterol   152    Triglycerides   121    HDL Cholesterol   51    LDL Cholesterol    79    Hemoglobin A1C 6.2   6.7   (A) Abnormal value            TSH    TSH 12/1/21 5/9/22   TSH 0.247 (A) 1.270   (A) Abnormal value            A1C Last 3 Results    HGBA1C Last 3 Results 12/1/21 5/3/22 9/16/22   Hemoglobin A1C 7.0 6.2 6.7             No results found for: ANADIRECT, FERRITIN, TESTOSTEROTT, [id number], FOLATE, RF, BNP            Assessment and Plan    Diagnoses and all orders for this visit:    1. Primary insomnia (Primary)   -still uncontrolled, but improved  Doing better with hydroxyzine 25 mg nightly, now only needing to take Ambien 5 mg nightly.  Goal is still to try to get her off Ambien.  Therefore, would like to try adding trazodone.  Suggest holding hydroxyzine at this time.  Add trazodone 50 mg nightly, if needed may increase to 100 mg nightly.  If this is effective, then would like to continue to wean her off Ambien.  For now, refill Ambien for 90-day supply as directed below.  These directions have been explained to her in detail, hand written by me.  Honorio report reviewed, appropriate.  -     zolpidem (AMBIEN) 10 MG tablet; TAKE 1/2  OR 1  TABLET  NIGHTLY AS NEEDED  Dispense: 90 tablet; Refill: 0    2. Type 2 diabetes mellitus without complication, without long-term current use of insulin ([facility])  -controlled, A1c 6.7  No change with Amaryl, refill upon request.  Continue with diabetic diet and regular cardio exercise  -     POC Glycosylated Hemoglobin (Hb A1C)    3. Rectocele  -needs to follow-up with urogynecologist/Dr. Tena.  Have recommended to her scheduling an appointment if having a hard time reaching her by phone.    Other orders  -     traZODone (DESYREL) 100 MG tablet; Take 1 tablet by mouth Every Night.  Dispense: 90 tablet; Refill: 1    Labs all up-to-date.  Plan lab work at follow-up in 3 to 4 months with Medicare wellness.          Follow Up   Return in about 4 months (around 1/16/2023) for Medicare Wellness.  Patient was given instructions and counseling regarding her condition or for health maintenance advice. Please see specific information pulled into the AVS if appropriate.

## 2022-09-20 DIAGNOSIS — F51.01 PRIMARY INSOMNIA: ICD-10-CM

## 2022-09-21 RX ORDER — LEVOTHYROXINE SODIUM 0.07 MG/1
TABLET ORAL
Qty: 90 TABLET | Refills: 3 | Status: SHIPPED | OUTPATIENT
Start: 2022-09-21

## 2022-09-21 RX ORDER — ZOLPIDEM TARTRATE 10 MG/1
TABLET ORAL
Qty: 90 TABLET | Refills: 0 | OUTPATIENT
Start: 2022-09-21

## 2022-09-21 RX ORDER — LISINOPRIL 5 MG/1
TABLET ORAL
Qty: 90 TABLET | Refills: 3 | Status: SHIPPED | OUTPATIENT
Start: 2022-09-21 | End: 2023-01-30 | Stop reason: DRUGHIGH

## 2022-09-21 RX ORDER — HYDROXYZINE HYDROCHLORIDE 25 MG/1
TABLET, FILM COATED ORAL
Qty: 90 TABLET | Refills: 1 | Status: SHIPPED | OUTPATIENT
Start: 2022-09-21

## 2022-11-16 DIAGNOSIS — F51.01 PRIMARY INSOMNIA: ICD-10-CM

## 2022-11-18 RX ORDER — ZOLPIDEM TARTRATE 10 MG/1
TABLET ORAL
Qty: 25 TABLET | Refills: 0 | OUTPATIENT
Start: 2022-11-18

## 2023-01-17 ENCOUNTER — OFFICE VISIT (OUTPATIENT)
Dept: FAMILY MEDICINE CLINIC | Facility: CLINIC | Age: 88
End: 2023-01-17
Payer: MEDICARE

## 2023-01-17 VITALS
WEIGHT: 130 LBS | BODY MASS INDEX: 23.92 KG/M2 | TEMPERATURE: 97.7 F | SYSTOLIC BLOOD PRESSURE: 100 MMHG | HEIGHT: 62 IN | HEART RATE: 72 BPM | OXYGEN SATURATION: 95 % | DIASTOLIC BLOOD PRESSURE: 60 MMHG

## 2023-01-17 DIAGNOSIS — N18.2 STAGE 2 CHRONIC KIDNEY DISEASE: ICD-10-CM

## 2023-01-17 DIAGNOSIS — E11.9 TYPE 2 DIABETES MELLITUS WITHOUT COMPLICATION, WITHOUT LONG-TERM CURRENT USE OF INSULIN: ICD-10-CM

## 2023-01-17 DIAGNOSIS — Z00.00 ENCOUNTER FOR MEDICARE ANNUAL WELLNESS EXAM: Primary | ICD-10-CM

## 2023-01-17 DIAGNOSIS — E78.2 MIXED HYPERLIPIDEMIA: ICD-10-CM

## 2023-01-17 DIAGNOSIS — F51.01 PRIMARY INSOMNIA: ICD-10-CM

## 2023-01-17 DIAGNOSIS — E03.9 ACQUIRED HYPOTHYROIDISM: ICD-10-CM

## 2023-01-17 DIAGNOSIS — Z79.899 HIGH RISK MEDICATION USE: ICD-10-CM

## 2023-01-17 DIAGNOSIS — N81.6 RECTOCELE: ICD-10-CM

## 2023-01-17 LAB
EXPIRATION DATE: NORMAL
HBA1C MFR BLD: 6.6 %
Lab: NORMAL

## 2023-01-17 PROCEDURE — 1159F MED LIST DOCD IN RCRD: CPT | Performed by: FAMILY MEDICINE

## 2023-01-17 PROCEDURE — 1170F FXNL STATUS ASSESSED: CPT | Performed by: FAMILY MEDICINE

## 2023-01-17 PROCEDURE — 1126F AMNT PAIN NOTED NONE PRSNT: CPT | Performed by: FAMILY MEDICINE

## 2023-01-17 PROCEDURE — 3044F HG A1C LEVEL LT 7.0%: CPT | Performed by: FAMILY MEDICINE

## 2023-01-17 PROCEDURE — 99214 OFFICE O/P EST MOD 30 MIN: CPT | Performed by: FAMILY MEDICINE

## 2023-01-17 PROCEDURE — G0439 PPPS, SUBSEQ VISIT: HCPCS | Performed by: FAMILY MEDICINE

## 2023-01-17 PROCEDURE — 83036 HEMOGLOBIN GLYCOSYLATED A1C: CPT | Performed by: FAMILY MEDICINE

## 2023-01-17 RX ORDER — ZOLPIDEM TARTRATE 10 MG/1
TABLET ORAL
Qty: 90 TABLET | Refills: 0 | Status: SHIPPED | OUTPATIENT
Start: 2023-01-17

## 2023-01-17 NOTE — PROGRESS NOTES
The ABCs of the Annual Wellness Visit  Subsequent Medicare Wellness Visit    Chief Complaint   Patient presents with   • Medicare Wellness-subsequent     YEARLY WELLNESS PATIENT IS FASTING   • Diabetes   • Hypothyroidism   • Insomnia   • Hyperlipidemia      Subjective    History of Present Illness:  Lisbet Wilks is a 88 y.o. female who presents for a Subsequent Medicare Wellness Visit.    NEEDS ANNUAL WELLNESS EXAM  And  3 to 4-month follow-up for chronic insomnia, DM, and hyperlipidemia    LOV with me in September for chronic med refills  -- Trazodone 50 mg nightly was added for chronic insomnia, with hopes of further weaning Ambien.  She was compliant with this and believes the trazodone is helping but still relies on her Ambien every night.  Although taking a lower dose/5 mg on most occasions.    Overall, continues to do remarkably well.  Remains very independent.  Active lifestyle, healthy eating and regular cardio exercise/walking.  Weight remains stable.  Denies chest pain, SOA, lower extremity edema.    Recently seen new cardiologist (needed a cardiologist closer to her home.)  Now seeing Dr. Meier, no medication changes made.    Complaints today of recurrent issues with her rectocele.  She is very frustrated with her urogynecologist, unable to get a call back from office.  Plans were to repair her rectocele?  Still with fecal incontinence.    Otherwise, needs chronic med refills.  Due for 6-month fasting labs.  Compliant with and tolerates all medications without side effects except she has NOT been very compliant with her lipid medications (Crestor 10 mg daily and Zetia daily.)  She has not taken her Crestor x 6 months (due to fatigue and myalgias.)  And is only taking her Zetia approximately 3 days/week.  Still relies on Ambien nightly, most days only 5 mg nightly also taking the new medication/trazodone 50 mg nightly for sleep    Routine health maintenance/screening test:  PAP --- nonapplicable,  aged out  MAMMO --- declines further screening  DEXA --- declines further screening  Colorectal Screen --- C-scope done about 10 years ago, normal.  Declines further screening  Vaccines --- up-to-date per patient (received at pharmacy.)  Smoking/ETOH Status --- reformed tobacco use.  Social alcohol only  Dentist, Eye Exam, Derm --- maintains regular dental visits, overdue for eye exam.  No dermatologist  Diet/Exercise --- maintains a diabetic/low-cholesterol diet and regular cardio exercise/enjoys walking.  Pertinent FH --- noncontributory    The following portions of the patient's history were reviewed and   updated as appropriate: allergies, current medications, past family history, past medical history, past social history, past surgical history and problem list.    Compared to one year ago, the patient feels her physical   health is the same.    Compared to one year ago, the patient feels her mental   health is the same.    Recent Hospitalizations:  She was not admitted to the hospital during the last year.       Current Medical Providers:  Patient Care Team:  Aleah Lopes MD as PCP - General  Aleah Lopes MD as PCP - Family Medicine    Outpatient Medications Prior to Visit   Medication Sig Dispense Refill   • aspirin 81 MG tablet Take 81 mg by mouth Daily.     • atenolol (TENORMIN) 25 MG tablet Take 1 tablet by mouth 2 (Two) Times a Day. 180 tablet 2   • ezetimibe (ZETIA) 10 MG tablet TAKE 1 TABLET EVERY DAY 90 tablet 1   • glimepiride (AMARYL) 4 MG tablet TAKE 1 TABLET TWICE DAILY 180 tablet 1   • hydrOXYzine (ATARAX) 25 MG tablet TAKE 1 TABLET AT BEDTIME 90 tablet 1   • levothyroxine (SYNTHROID, LEVOTHROID) 75 MCG tablet TAKE 1 TABLET EVERY DAY (DOSE CHANGE) 90 tablet 3   • lisinopril (PRINIVIL,ZESTRIL) 5 MG tablet TAKE 1 TABLET EVERY DAY 90 tablet 3   • omeprazole (priLOSEC) 20 MG capsule TAKE 1 CAPSULE EVERY DAY 90 capsule 1   • rosuvastatin (CRESTOR) 10 MG tablet TAKE 1 TABLET EVERY DAY 90 tablet  "1   • traZODone (DESYREL) 100 MG tablet Take 1 tablet by mouth Every Night. 90 tablet 1   • zolpidem (AMBIEN) 10 MG tablet TAKE 1/2  OR 1  TABLET  NIGHTLY AS NEEDED 90 tablet 0     No facility-administered medications prior to visit.       No opioid medication identified on active medication list. I have reviewed chart for other potential  high risk medication/s and harmful drug interactions in the elderly.          Aspirin is on active medication list. Aspirin use is indicated based on review of current medical condition/s. Pros and cons of this therapy have been discussed today. Benefits of this medication outweigh potential harm.  Patient has been encouraged to continue taking this medication.  .      Patient Active Problem List   Diagnosis   • Type 2 diabetes mellitus with hypoglycemia ([facility])   • NSTEMI (non-ST elevated myocardial infarction) ([facility])   • Pericardial effusion   • Gastroesophageal reflux disease without esophagitis   • Rib fracture   • DNR (do not resuscitate)   • CAD (coronary artery disease)   • Primary insomnia   • Acquired hypothyroidism   • Mixed hyperlipidemia   • Dizziness   • Rectocele   • OAB (overactive bladder)   • Stage 2 chronic kidney disease   • Type 2 diabetes mellitus without complication, without long-term current use of insulin ([facility])   • Statin intolerance     Advance Care Planning  Advance Directive is not on file.  ACP discussion was held with the patient during this visit. Patient has an advance directive (not in EMR), copy requested.    Review of Systems   Constitutional: Negative for fever.   Respiratory: Negative for cough and shortness of breath.    Cardiovascular: Negative for chest pain.        Objective    Vitals:    01/17/23 0933   BP: 100/60   BP Location: Right arm   Patient Position: Sitting   Cuff Size: Adult   Pulse: 72   Temp: 97.7 °F (36.5 °C)   SpO2: 95%   Weight: 59 kg (130 lb)   Height: 157.5 cm (62\")   PainSc: 0-No pain     BMI Readings from Last 1 Encounters: "   01/17/23 23.78 kg/m²   BMI is within normal parameters. No follow-up required.    Does the patient have evidence of cognitive impairment? No    Physical Exam  Vitals and nursing note reviewed.   Constitutional:       Appearance: Normal appearance. She is well-developed.   HENT:      Head: Normocephalic and atraumatic.      Nose: Nose normal.   Eyes:      Conjunctiva/sclera: Conjunctivae normal.      Pupils: Pupils are equal, round, and reactive to light.   Neck:      Thyroid: No thyromegaly.   Cardiovascular:      Rate and Rhythm: Normal rate and regular rhythm.      Heart sounds: Normal heart sounds. No murmur heard.  Pulmonary:      Effort: Pulmonary effort is normal.      Breath sounds: Normal breath sounds.   Abdominal:      General: Abdomen is flat. Bowel sounds are normal. There is no distension.      Palpations: Abdomen is soft. There is no hepatomegaly, splenomegaly or mass.      Tenderness: There is no abdominal tenderness. There is no guarding or rebound.      Hernia: No hernia is present.   Musculoskeletal:         General: Normal range of motion.      Cervical back: Normal range of motion and neck supple.      Right lower leg: No edema.      Left lower leg: No edema.   Feet:      Right foot:      Skin integrity: Skin integrity normal. No ulcer, blister or skin breakdown.      Left foot:      Skin integrity: Skin integrity normal. No ulcer, blister or skin breakdown.      Comments: S/P diabetic foot exam done, WNL  Lymphadenopathy:      Cervical: No cervical adenopathy.   Skin:     General: Skin is warm.   Neurological:      General: No focal deficit present.      Mental Status: She is alert.   Psychiatric:         Mood and Affect: Mood normal.         Behavior: Behavior normal.         Thought Content: Thought content normal.         Judgment: Judgment normal.       Common labs    Common Labs 5/9/22 5/9/22 9/16/22 1/17/23 1/17/23    0858 0858  0959 1056   Glucose 102 (A)       BUN 17       Creatinine  0.87       Sodium 143       Potassium 5.2       Chloride 105       Calcium 9.5       Total Protein 7.1       Albumin 4.5       Total Bilirubin 0.7       Alkaline Phosphatase 65       AST (SGOT) 18       ALT (SGPT) 14       Total Cholesterol  152      Triglycerides  121      HDL Cholesterol  51      LDL Cholesterol   79      Hemoglobin A1C   6.7 6.6    Microalbumin, Urine     57.7   (A) Abnormal value            TSH    TSH 5/9/22   TSH 1.270           A1C Last 3 Results    HGBA1C Last 3 Results 5/3/22 9/16/22 1/17/23   Hemoglobin A1C 6.2 6.7 6.6           Microalbumin    Microalbumin 1/17/23   Microalbumin, Urine 57.7             No results found for: ANADIRECT, FERRITIN, TESTOSTEROTT, [id number], FOLATE, RF, BNP          HEALTH RISK ASSESSMENT    Smoking Status:  Social History     Tobacco Use   Smoking Status Former   • Packs/day: 1.00   • Years: 31.00   • Pack years: 31.00   • Types: Cigarettes   Smokeless Tobacco Former   Tobacco Comments    CAFFEINE USE- 2 CUPS COFFEE DAILY     Alcohol Consumption:  Social History     Substance and Sexual Activity   Alcohol Use No     Fall Risk Screen:    TIMADI Fall Risk Assessment was completed, and patient is at LOW risk for falls.Assessment completed on:1/17/2023    Depression Screening:  PHQ-2/PHQ-9 Depression Screening 1/17/2023   Retired PHQ-9 Total Score -   Retired Total Score -   Little Interest or Pleasure in Doing Things 0-->not at all   Feeling Down, Depressed or Hopeless 0-->not at all   PHQ-9: Brief Depression Severity Measure Score 0       Health Habits and Functional and Cognitive Screening:  Functional & Cognitive Status 1/17/2023   Do you have difficulty preparing food and eating? No   Do you have difficulty bathing yourself, getting dressed or grooming yourself? No   Do you have difficulty using the toilet? No   Do you have difficulty moving around from place to place? No   Do you have trouble with steps or getting out of a bed or a chair? No   Current Diet  Well Balanced Diet   Dental Exam Up to date   Eye Exam Not up to date   Exercise (times per week) 4 times per week   Current Exercises Include Walking   Do you need help using the phone?  No   Are you deaf or do you have serious difficulty hearing?  No   Do you need help with transportation? No   Do you need help shopping? No   Do you need help preparing meals?  No   Do you need help with housework?  No   Do you need help with laundry? No   Do you need help taking your medications? No   Do you need help managing money? No   Do you ever drive or ride in a car without wearing a seat belt? No       Age-appropriate Screening Schedule:  Refer to the list below for future screening recommendations based on patient's age, sex and/or medical conditions. Orders for these recommended tests are listed in the plan section. The patient has been provided with a written plan.    Health Maintenance   Topic Date Due   • DXA SCAN  Never done   • ZOSTER VACCINE (1 of 2) Never done   • DIABETIC EYE EXAM  08/09/2020   • INFLUENZA VACCINE  08/01/2022   • LIPID PANEL  05/09/2023   • HEMOGLOBIN A1C  07/17/2023   • URINE MICROALBUMIN  01/17/2024   • TDAP/TD VACCINES (2 - Td or Tdap) 12/01/2031              Assessment & Plan   CMS Preventative Services Quick Reference  Risk Factors Identified During Encounter  Vision Screening Recommended  The above risks/problems have been discussed with the patient.  Follow up actions/plans if indicated are seen below in the Assessment/Plan Section.  Pertinent information has been shared with the patient in the After Visit Summary.    Diagnoses and all orders for this visit:    1. Encounter for Medicare annual wellness exam (Primary)  -S/P subsequent  exam done, WNL  Needed screening today includes CBC and ophthalmology exam.  Declines further colon screening, mammograms, and bone density.  Have discussed and provided information regarding a Living Will.  Otherwise, continue with healthy lifestyle  --Needs low-carb/low calorie/low cholesterol diet and increase cardio exercise to greater than 150 minutes weekly  -     CBC & Differential    2. Mixed hyperlipidemia  -uncontrolled  Noncompliant with Zetia dosing (only taking 3 days/week)  No longer taking Crestor, myalgias.  Plan to recheck lipids, CMP  Ideally goal LDL needs to be less than 70 given history of DM and remote CAD history.  If at goal plan to continue Zetia 10 mg daily.    Otherwise, may need to consider restarting Crestor but at 5 mg?  -     Comprehensive Metabolic Panel  -     Lipid Panel With LDL / HDL Ratio    3. Type 2 diabetes mellitus without complication, without long-term current use of insulin ([facility]CC)  Controlled, A1c 6.6  -no change of medication.  Plan to continue Amaryl as prescribed.  -     POC Glycosylated Hemoglobin (Hb A1C)  -     Microalbumin / Creatinine Urine Ratio - Urine, Clean Catch    4. Acquired hypothyroidism  -clinically appears controlled  Due to recheck TSH  If therapeutic plan to continue Synthroid 75 mcg 1 p.o. every morning  -     TSH    5. Primary insomnia  -remains uncontrolled  Update Urine Tox screen today.  Honorio report reviewed, appropriate.  Very low risk patient behavior.  Recommend increasing trazodone to 100 mg nightly, with goal of further weaning Ambien  -     zolpidem (AMBIEN) 10 MG tablet; TAKE 1/2  OR 1  TABLET  NIGHTLY AS NEEDED  Dispense: 90 tablet; Refill: 0  -     ToxASSURE Select 13 Discrete -    6. Stage 2 chronic kidney disease  -stable, continue to avoid NSAIDs    7. Rectocele  -uncontrolled  Requesting a new referral  Increase fiber to diet.  Continue with regular cardio exercise  -     Ambulatory Referral to Colorectal Surgery    8. High risk medication use  -     ToxASSURE Select 13 Discrete -    In addition to wellness exam today, seen and treated for separate and identifiable uncontrolled insomnia, uncontrolled hyperlipidemia, and uncontrolled  rectocele    Follow Up:   Return in about 4  months (around 5/17/2023) for Recheck.     An After Visit Summary and PPPS were made available to the patient.

## 2023-01-17 NOTE — PATIENT INSTRUCTIONS
Needs to take Zetia every day  Further recommendations to follow with Crestor, may need 1/2 tablet?    May increase trazodone to 100 mg nightly, if successful then may further wean Ambien to as needed only 5 mg    Needs to see ophthalmologist  New referral placed to colorectal surgeon  Please give information for Living Will    Needs to check with pharmacy regarding vaccination record pneumococcal vaccines?  Needs shingles vaccine    Continue current treatment plan.

## 2023-01-18 LAB
ALBUMIN/CREAT UR: 44 MG/G CREAT (ref 0–29)
CREAT UR-MCNC: 131.2 MG/DL
MICROALBUMIN UR-MCNC: 57.7 UG/ML

## 2023-01-19 DIAGNOSIS — N81.6 RECTOCELE: Primary | ICD-10-CM

## 2023-01-22 PROBLEM — Z78.9 STATIN INTOLERANCE: Status: ACTIVE | Noted: 2023-01-22

## 2023-01-26 LAB
6MAM UR QL CFM: NEGATIVE
6MAM/CREAT UR: NOT DETECTED NG/MG CREAT
7AMINOCLONAZEPAM/CREAT UR: NOT DETECTED NG/MG CREAT
A-OH ALPRAZ/CREAT UR: NOT DETECTED NG/MG CREAT
A-OH-TRIAZOLAM/CREAT UR CFM: NOT DETECTED NG/MG CREAT
ALBUMIN SERPL-MCNC: 4.4 G/DL (ref 3.5–5.2)
ALBUMIN/GLOB SERPL: 1.5 G/DL
ALFENTANIL/CREAT UR CFM: NOT DETECTED NG/MG CREAT
ALP SERPL-CCNC: 67 U/L (ref 39–117)
ALPHA-HYDROXYMIDAZOLAM, URINE: NOT DETECTED NG/MG CREAT
ALPRAZ/CREAT UR CFM: NOT DETECTED NG/MG CREAT
ALT SERPL-CCNC: 9 U/L (ref 1–33)
AMOBARBITAL UR QL CFM: NOT DETECTED
AMPHET/CREAT UR: NOT DETECTED NG/MG CREAT
AMPHETAMINES UR QL CFM: NEGATIVE
AST SERPL-CCNC: 14 U/L (ref 1–32)
BARBITAL UR QL CFM: NOT DETECTED
BARBITURATES UR QL CFM: NEGATIVE
BASOPHILS # BLD AUTO: 0.07 10*3/MM3 (ref 0–0.2)
BASOPHILS NFR BLD AUTO: 0.7 % (ref 0–1.5)
BENZODIAZ UR QL CFM: NEGATIVE
BILIRUB SERPL-MCNC: 0.8 MG/DL (ref 0–1.2)
BUN SERPL-MCNC: 22 MG/DL (ref 8–23)
BUN/CREAT SERPL: 20.4 (ref 7–25)
BUPRENORPHINE UR QL CFM: NEGATIVE
BUPRENORPHINE/CREAT UR: NOT DETECTED NG/MG CREAT
BUTABARBITAL UR QL CFM: NOT DETECTED
BUTALBITAL UR QL CFM: NOT DETECTED
BZE/CREAT UR: NOT DETECTED NG/MG CREAT
CALCIUM SERPL-MCNC: 9.6 MG/DL (ref 8.6–10.5)
CANNABINOIDS UR QL CFM: NEGATIVE
CARBOXYTHC/CREAT UR: NOT DETECTED NG/MG CREAT
CHLORIDE SERPL-SCNC: 105 MMOL/L (ref 98–107)
CHOLEST SERPL-MCNC: 220 MG/DL (ref 0–200)
CLONAZEPAM/CREAT UR CFM: NOT DETECTED NG/MG CREAT
CO2 SERPL-SCNC: 27.1 MMOL/L (ref 22–29)
COCAETHYLENE/CREAT UR CFM: NOT DETECTED NG/MG CREAT
COCAINE UR QL CFM: NEGATIVE
COCAINE/CREAT UR CFM: NOT DETECTED NG/MG CREAT
CODEINE/CREAT UR: NOT DETECTED NG/MG CREAT
CREAT SERPL-MCNC: 1.08 MG/DL (ref 0.57–1)
CREAT UR-MCNC: 131 MG/DL
DESALKYLFLURAZ/CREAT UR: NOT DETECTED NG/MG CREAT
DESMETHYLFLUNITRAZEPAM: NOT DETECTED NG/MG CREAT
DHC/CREAT UR: NOT DETECTED NG/MG CREAT
DIAZEPAM/CREAT UR: NOT DETECTED NG/MG CREAT
DRUGS UR: NORMAL
EDDP/CREAT UR: NOT DETECTED NG/MG CREAT
EGFRCR SERPLBLD CKD-EPI 2021: 49.5 ML/MIN/1.73
EOSINOPHIL # BLD AUTO: 0.21 10*3/MM3 (ref 0–0.4)
EOSINOPHIL NFR BLD AUTO: 2.1 % (ref 0.3–6.2)
ERYTHROCYTE [DISTWIDTH] IN BLOOD BY AUTOMATED COUNT: 12.7 % (ref 12.3–15.4)
ETHANOL UR CFM-MCNC: NOT DETECTED G/DL
ETHANOL UR QL CFM: NEGATIVE
FENTANYL UR QL CFM: NEGATIVE
FENTANYL/CREAT UR: NOT DETECTED NG/MG CREAT
FLUNITRAZEPAM UR QL CFM: NOT DETECTED NG/MG CREAT
GLOBULIN SER CALC-MCNC: 3 GM/DL
GLUCOSE SERPL-MCNC: 97 MG/DL (ref 65–99)
HCT VFR BLD AUTO: 43.6 % (ref 34–46.6)
HDLC SERPL-MCNC: 51 MG/DL (ref 40–60)
HGB BLD-MCNC: 14.7 G/DL (ref 12–15.9)
HYDROCODONE/CREAT UR: NOT DETECTED NG/MG CREAT
HYDROMORPHONE/CREAT UR: NOT DETECTED NG/MG CREAT
IMM GRANULOCYTES # BLD AUTO: 0.02 10*3/MM3 (ref 0–0.05)
IMM GRANULOCYTES NFR BLD AUTO: 0.2 % (ref 0–0.5)
LDLC SERPL CALC-MCNC: 141 MG/DL (ref 0–100)
LDLC/HDLC SERPL: 2.7 {RATIO}
LORAZEPAM/CREAT UR: NOT DETECTED NG/MG CREAT
LYMPHOCYTES # BLD AUTO: 2.55 10*3/MM3 (ref 0.7–3.1)
LYMPHOCYTES NFR BLD AUTO: 25.7 % (ref 19.6–45.3)
MCH RBC QN AUTO: 29.5 PG (ref 26.6–33)
MCHC RBC AUTO-ENTMCNC: 33.7 G/DL (ref 31.5–35.7)
MCV RBC AUTO: 87.4 FL (ref 79–97)
MDA/CREAT UR: NOT DETECTED NG/MG CREAT
MDMA/CREAT UR: NOT DETECTED NG/MG CREAT
MEPHOBARBITAL UR QL CFM: NOT DETECTED
METHADONE UR QL CFM: NEGATIVE
METHADONE/CREAT UR: NOT DETECTED NG/MG CREAT
METHAMPHET/CREAT UR: NOT DETECTED NG/MG CREAT
MIDAZOLAM/CREAT UR CFM: NOT DETECTED NG/MG CREAT
MONOCYTES # BLD AUTO: 0.58 10*3/MM3 (ref 0.1–0.9)
MONOCYTES NFR BLD AUTO: 5.8 % (ref 5–12)
MORPHINE/CREAT UR: NOT DETECTED NG/MG CREAT
N-NORTRAMADOL/CREAT UR CFM: NOT DETECTED NG/MG CREAT
NARCOTICS UR: NEGATIVE
NEUTROPHILS # BLD AUTO: 6.51 10*3/MM3 (ref 1.7–7)
NEUTROPHILS NFR BLD AUTO: 65.5 % (ref 42.7–76)
NORBUPRENORPHINE/CREAT UR: NOT DETECTED NG/MG CREAT
NORCODEINE/CREAT UR CFM: NOT DETECTED NG/MG CREAT
NORDIAZEPAM/CREAT UR: NOT DETECTED NG/MG CREAT
NORFENTANYL/CREAT UR: NOT DETECTED NG/MG CREAT
NORHYDROCODONE/CREAT UR: NOT DETECTED NG/MG CREAT
NORMORPHINE UR-MCNC: NOT DETECTED NG/MG CREAT
NOROXYCODONE/CREAT UR: NOT DETECTED NG/MG CREAT
NOROXYMORPHONE/CREAT UR CFM: NOT DETECTED NG/MG CREAT
NRBC BLD AUTO-RTO: 0 /100 WBC (ref 0–0.2)
O-NORTRAMADOL UR CFM-MCNC: NOT DETECTED NG/MG CREAT
OPIATES UR QL CFM: NEGATIVE
OXAZEPAM/CREAT UR: NOT DETECTED NG/MG CREAT
OXYCODONE UR QL CFM: NEGATIVE
OXYCODONE/CREAT UR: NOT DETECTED NG/MG CREAT
OXYMORPHONE/CREAT UR: NOT DETECTED NG/MG CREAT
PENTOBARB UR QL CFM: NOT DETECTED
PHENOBARB UR QL CFM: NOT DETECTED
PLATELET # BLD AUTO: 292 10*3/MM3 (ref 140–450)
POTASSIUM SERPL-SCNC: 4.8 MMOL/L (ref 3.5–5.2)
PROT SERPL-MCNC: 7.4 G/DL (ref 6–8.5)
RBC # BLD AUTO: 4.99 10*6/MM3 (ref 3.77–5.28)
SECOBARBITAL UR QL CFM: NOT DETECTED
SERVICE CMNT 02-IMP: NORMAL
SODIUM SERPL-SCNC: 141 MMOL/L (ref 136–145)
SUFENTANIL/CREAT UR CFM: NOT DETECTED NG/MG CREAT
TAPENTADOL UR QL CFM: NEGATIVE
TAPENTADOL/CREAT UR: NOT DETECTED NG/MG CREAT
TEMAZEPAM/CREAT UR: NOT DETECTED NG/MG CREAT
THIOPENTAL UR QL CFM: NOT DETECTED
TRAMADOL UR QL CFM: NOT DETECTED NG/MG CREAT
TRIGL SERPL-MCNC: 157 MG/DL (ref 0–150)
TSH SERPL DL<=0.005 MIU/L-ACNC: 1.41 UIU/ML (ref 0.27–4.2)
VLDLC SERPL CALC-MCNC: 28 MG/DL (ref 5–40)
WBC # BLD AUTO: 9.94 10*3/MM3 (ref 3.4–10.8)

## 2023-01-30 RX ORDER — LISINOPRIL 10 MG/1
10 TABLET ORAL DAILY
Qty: 90 TABLET | Refills: 1 | Status: SHIPPED | OUTPATIENT
Start: 2023-01-30

## 2023-01-30 NOTE — PROGRESS NOTES
Positive microalbumin--given diabetes increase lisinopril to 10 mg daily    Also cholesterol/LDL bad/141, given diabetes needs to be a lot better.  But this is probably because she is not taking Crestor and only taking her Zetia 3 days/week?  Really needs to at least take Zetia 10 mg daily, every day, may consider adding Crestor 5 mg?.  Follow-up as planned

## 2023-02-03 RX ORDER — GLIMEPIRIDE 4 MG/1
TABLET ORAL
Qty: 180 TABLET | Refills: 1 | Status: SHIPPED | OUTPATIENT
Start: 2023-02-03

## 2023-02-03 RX ORDER — ATENOLOL 25 MG/1
TABLET ORAL
Qty: 180 TABLET | Refills: 2 | Status: SHIPPED | OUTPATIENT
Start: 2023-02-03

## 2023-02-14 RX ORDER — TRAZODONE HYDROCHLORIDE 100 MG/1
TABLET ORAL
Qty: 90 TABLET | Refills: 1 | Status: SHIPPED | OUTPATIENT
Start: 2023-02-14

## 2023-05-10 NOTE — TELEPHONE ENCOUNTER
Rx Refill Note  Requested Prescriptions     Pending Prescriptions Disp Refills   • hydrOXYzine (ATARAX) 25 MG tablet [Pharmacy Med Name: HYDROXYZINE HYDROCHLORIDE 25 MG Tablet] 90 tablet 1     Sig: TAKE 1 TABLET AT BEDTIME      Last office visit with prescribing clinician: 1/17/2023   Last telemedicine visit with prescribing clinician: 2/14/2023   Next office visit with prescribing clinician: 5/23/2023                         Would you like a call back once the refill request has been completed: [] Yes [] No    If the office needs to give you a call back, can they leave a voicemail: [] Yes [] No    Prachi Craven MA/LMR  05/10/23, 17:01 EDT

## 2023-05-11 RX ORDER — HYDROXYZINE HYDROCHLORIDE 25 MG/1
TABLET, FILM COATED ORAL
Qty: 90 TABLET | Refills: 1 | Status: SHIPPED | OUTPATIENT
Start: 2023-05-11

## 2023-06-13 ENCOUNTER — OFFICE VISIT (OUTPATIENT)
Dept: FAMILY MEDICINE CLINIC | Facility: CLINIC | Age: 88
End: 2023-06-13
Payer: MEDICARE

## 2023-06-13 VITALS
DIASTOLIC BLOOD PRESSURE: 60 MMHG | TEMPERATURE: 98.2 F | WEIGHT: 135.4 LBS | HEART RATE: 68 BPM | SYSTOLIC BLOOD PRESSURE: 110 MMHG | OXYGEN SATURATION: 98 % | HEIGHT: 62 IN | BODY MASS INDEX: 24.92 KG/M2

## 2023-06-13 DIAGNOSIS — N18.2 STAGE 2 CHRONIC KIDNEY DISEASE: ICD-10-CM

## 2023-06-13 DIAGNOSIS — E78.2 MIXED HYPERLIPIDEMIA: ICD-10-CM

## 2023-06-13 DIAGNOSIS — F51.01 PRIMARY INSOMNIA: ICD-10-CM

## 2023-06-13 DIAGNOSIS — E11.9 TYPE 2 DIABETES MELLITUS WITHOUT COMPLICATION, WITHOUT LONG-TERM CURRENT USE OF INSULIN: Primary | ICD-10-CM

## 2023-06-13 LAB
EXPIRATION DATE: NORMAL
HBA1C MFR BLD: 6.5 %
Lab: NORMAL

## 2023-06-13 RX ORDER — ZOLPIDEM TARTRATE 10 MG/1
TABLET ORAL
Qty: 90 TABLET | Refills: 0 | Status: SHIPPED | OUTPATIENT
Start: 2023-06-13

## 2023-06-13 NOTE — PROGRESS NOTES
"Chief Complaint  Diabetes (Check up), Hyperlipidemia, Insomnia, and Hypothyroidism     4 to 5-month follow-up on chronic insomnia, DM, hyperlipidemia, and CKD    LOV with me in January for Medicare wellness, chronic med refills, and uncontrolled lipids and insomnia.  Multiple things done at last visit  -- Lisinopril increased from 5 to 10 mg daily due to positive urine microalbumin, given DM  -- Restarted on Crestor 10 mg daily due to LDL of 141, given DM  -- Trazodone increased from 50 to 100 mg nightly for uncontrolled insomnia in efforts to further decrease Ambien dose    Overall, continues to do well.  Still lives a very healthy and active lifestyle, walks 2 miles per day nearly every day!!  She has been compliant with all the above recommendations and tolerating new medications without side effects.  Mood remains good.  Sleeping fine with Ambien, generally 1/2 tablet / 5 mg nightly and on occasions may take 10 mg.  Weight remains stable.  No chest pain, SOA.    Does not check blood sugars routinely.  No changes made with Amaryl 4 mg daily in quite some time.  No further hypoglycemic episodes.  Denies polyuria, polydipsia.    No new complaints or concerns today.  Needs chronic med refills.  Due for repeat fasting labs, restarted Crestor 10 mg at last visit.  Compliant with and tolerates all medications without side effects.    Review of Systems   Constitutional:  Negative for fever and unexpected weight change.   Respiratory:  Negative for cough and shortness of breath.    Cardiovascular:  Negative for chest pain.      Subjective          Lisbet J Sheets presents to [facility]    Objective   Vital Signs:   Vitals:    06/13/23 1428   BP: 110/60   BP Location: Left arm   Patient Position: Sitting   Cuff Size: Adult   Pulse: 68   Temp: 98.2 °F (36.8 °C)   SpO2: 98%   Weight: 61.4 kg (135 lb 6.4 oz)   Height: 157.5 cm (62\")      Body mass index is 24.76 kg/m².   Physical Exam  Vitals " and nursing note reviewed.   Constitutional:       Appearance: Normal appearance. She is well-developed.   HENT:      Head: Normocephalic and atraumatic.      Nose: Nose normal.   Eyes:      Conjunctiva/sclera: Conjunctivae normal.      Pupils: Pupils are equal, round, and reactive to light.   Neck:      Thyroid: No thyromegaly.   Cardiovascular:      Rate and Rhythm: Normal rate and regular rhythm.      Heart sounds: Normal heart sounds. No murmur heard.  Pulmonary:      Effort: Pulmonary effort is normal.      Breath sounds: Normal breath sounds.   Abdominal:      General: Abdomen is flat. Bowel sounds are normal. There is no distension.      Palpations: Abdomen is soft. There is no hepatomegaly, splenomegaly or mass.      Tenderness: There is no abdominal tenderness. There is no guarding or rebound.      Hernia: No hernia is present.   Musculoskeletal:         General: Normal range of motion.      Cervical back: Normal range of motion and neck supple.      Right lower leg: No edema.      Left lower leg: No edema.   Lymphadenopathy:      Cervical: No cervical adenopathy.   Skin:     General: Skin is warm.   Neurological:      General: No focal deficit present.      Mental Status: She is alert.   Psychiatric:         Mood and Affect: Mood normal.         Behavior: Behavior normal.         Thought Content: Thought content normal.         Judgment: Judgment normal.        Result Review :     Common labs        9/16/2022    09:19 1/17/2023    09:59 1/17/2023    10:31 1/17/2023    10:56 6/13/2023    15:02   Common Labs   Glucose   97      BUN   22      Creatinine   1.08      Sodium   141      Potassium   4.8      Chloride   105      Calcium   9.6      Total Protein   7.4      Albumin   4.4      Total Bilirubin   0.8      Alkaline Phosphatase   67      AST (SGOT)   14      ALT (SGPT)   9      WBC   9.94      Hemoglobin   14.7      Hematocrit   43.6      Platelets   292      Total Cholesterol   220      Triglycerides    157      HDL Cholesterol   51      LDL Cholesterol    141      Hemoglobin A1C 6.7  6.6    6.5    Microalbumin, Urine    57.7       TSH        1/17/2023    10:31   TSH   TSH 1.410      A1C Last 3 Results        9/16/2022    09:19 1/17/2023    09:59 6/13/2023    15:02   HGBA1C Last 3 Results   Hemoglobin A1C 6.7  6.6  6.5      Microalbumin        1/17/2023    10:56   Microalbumin   Microalbumin, Urine 57.7        No results found for: ANADIRECT, FERRITIN, TESTOSTEROTT, [id number], FOLATE, RF, BNP            Assessment and Plan    Diagnoses and all orders for this visit:    1. Type 2 diabetes mellitus without complication, without long-term current use of insulin (Primary)  -well-controlled, A1c 6.5  No change with medication, continue Amaryl 4 mg as prescribed  Urine microalbumin up-to-date, ACE inhibitor increased at last dose.  Diabetic eye exam and foot exam up-to-date.  Needs low-carb/low calorie/low cholesterol diet and increase cardio exercise to greater than 150 minutes weekly  -     POC Glycosylated Hemoglobin (Hb A1C)    2. Mixed hyperlipidemia -uncontrolled  Hopefully improved since restarting Crestor 10 mg daily at last visit approximately 4 months ago.  Plan to recheck lipids, CMP today.  Goal LDL needs to be less than 70 given history of DM  If at goal plan to continue Crestor 10 mg daily, otherwise may need to increase to 20 mg possibly restart Zetia (history of intolerance to high-dose statins.)  Continue with healthy lifestyle --Needs low-carb/low calorie/low cholesterol diet and increase cardio exercise to greater than 150 minutes weekly  -     Comprehensive Metabolic Panel  -     Lipid Panel With LDL / HDL Ratio    3. Stage 2 chronic kidney disease -stable, continue to avoid NSAIDs     4. Primary insomnia -controlled  Urine Tox screen/January 2023 and [facility] report both reviewed, appropriate.  Very low risk patient behavior.  Plan to continue trazodone 100 mg nightly  May continue/refill Ambien as  prescribed below  -     zolpidem (AMBIEN) 10 MG tablet; TAKE 1/2  OR 1  TABLET  NIGHTLY AS NEEDED  Dispense: 90 tablet; Refill: 0        Follow Up   Return in about 4 months (around 10/13/2023) for Recheck.  Patient was given instructions and counseling regarding her condition or for health maintenance advice. Please see specific information pulled into the AVS if appropriate.          no

## 2023-06-14 ENCOUNTER — TELEPHONE (OUTPATIENT)
Dept: FAMILY MEDICINE CLINIC | Facility: CLINIC | Age: 88
End: 2023-06-14

## 2023-06-14 LAB
ALBUMIN SERPL-MCNC: 4.7 G/DL (ref 3.5–5.2)
ALBUMIN/GLOB SERPL: 1.7 G/DL
ALP SERPL-CCNC: 62 U/L (ref 39–117)
ALT SERPL-CCNC: 13 U/L (ref 1–33)
AST SERPL-CCNC: 20 U/L (ref 1–32)
BILIRUB SERPL-MCNC: 0.6 MG/DL (ref 0–1.2)
BUN SERPL-MCNC: 29 MG/DL (ref 8–23)
BUN/CREAT SERPL: 26.9 (ref 7–25)
CALCIUM SERPL-MCNC: 10.3 MG/DL (ref 8.6–10.5)
CHLORIDE SERPL-SCNC: 105 MMOL/L (ref 98–107)
CHOLEST SERPL-MCNC: 151 MG/DL (ref 0–200)
CO2 SERPL-SCNC: 25.2 MMOL/L (ref 22–29)
CREAT SERPL-MCNC: 1.08 MG/DL (ref 0.57–1)
EGFRCR SERPLBLD CKD-EPI 2021: 49.2 ML/MIN/1.73
GLOBULIN SER CALC-MCNC: 2.8 GM/DL
GLUCOSE SERPL-MCNC: 66 MG/DL (ref 65–99)
HDLC SERPL-MCNC: 53 MG/DL (ref 40–60)
LDLC SERPL CALC-MCNC: 71 MG/DL (ref 0–100)
LDLC/HDLC SERPL: 1.26 {RATIO}
POTASSIUM SERPL-SCNC: 5 MMOL/L (ref 3.5–5.2)
PROT SERPL-MCNC: 7.5 G/DL (ref 6–8.5)
SODIUM SERPL-SCNC: 142 MMOL/L (ref 136–145)
TRIGL SERPL-MCNC: 156 MG/DL (ref 0–150)
VLDLC SERPL CALC-MCNC: 27 MG/DL (ref 5–40)

## 2023-06-14 NOTE — TELEPHONE ENCOUNTER
Caller: Lisbet Wilks    Relationship: Self    Best call back number: [phone]    What was the call regarding: PATIENT IS CALLING TO LET DR SMITH KNOW THAT SHE IS TAKING 10 MG OF ROSUVASTATIN.

## 2023-07-24 RX ORDER — LISINOPRIL 10 MG/1
TABLET ORAL
Qty: 90 TABLET | Refills: 1 | Status: SHIPPED | OUTPATIENT
Start: 2023-07-24

## 2023-10-09 RX ORDER — GLIMEPIRIDE 4 MG/1
TABLET ORAL
Qty: 180 TABLET | Refills: 10 | Status: SHIPPED | OUTPATIENT
Start: 2023-10-09

## 2023-10-30 RX ORDER — LEVOTHYROXINE SODIUM 0.07 MG/1
TABLET ORAL
Qty: 90 TABLET | Refills: 10 | Status: SHIPPED | OUTPATIENT
Start: 2023-10-30

## 2023-11-01 ENCOUNTER — OFFICE VISIT (OUTPATIENT)
Dept: FAMILY MEDICINE CLINIC | Facility: CLINIC | Age: 88
End: 2023-11-01
Payer: MEDICARE

## 2023-11-01 VITALS
OXYGEN SATURATION: 98 % | SYSTOLIC BLOOD PRESSURE: 122 MMHG | TEMPERATURE: 97.8 F | WEIGHT: 137.6 LBS | DIASTOLIC BLOOD PRESSURE: 68 MMHG | HEART RATE: 70 BPM | BODY MASS INDEX: 25.32 KG/M2 | HEIGHT: 62 IN

## 2023-11-01 DIAGNOSIS — F51.01 PRIMARY INSOMNIA: Primary | ICD-10-CM

## 2023-11-01 DIAGNOSIS — E11.9 TYPE 2 DIABETES MELLITUS WITHOUT COMPLICATION, WITHOUT LONG-TERM CURRENT USE OF INSULIN: ICD-10-CM

## 2023-11-01 DIAGNOSIS — E78.2 MIXED HYPERLIPIDEMIA: ICD-10-CM

## 2023-11-01 DIAGNOSIS — E03.9 ACQUIRED HYPOTHYROIDISM: ICD-10-CM

## 2023-11-01 DIAGNOSIS — N18.31 STAGE 3A CHRONIC KIDNEY DISEASE: ICD-10-CM

## 2023-11-01 DIAGNOSIS — N32.81 OAB (OVERACTIVE BLADDER): ICD-10-CM

## 2023-11-01 LAB
EXPIRATION DATE: ABNORMAL
HBA1C MFR BLD: 6.7 % (ref 4.5–5.7)
Lab: ABNORMAL

## 2023-11-01 RX ORDER — HYDROXYZINE HYDROCHLORIDE 25 MG/1
25 TABLET, FILM COATED ORAL NIGHTLY
COMMUNITY
Start: 2023-10-25

## 2023-11-01 RX ORDER — EZETIMIBE 10 MG/1
10 TABLET ORAL DAILY
COMMUNITY

## 2023-11-01 RX ORDER — ZOLPIDEM TARTRATE 10 MG/1
TABLET ORAL
Qty: 90 TABLET | Refills: 0 | Status: SHIPPED | OUTPATIENT
Start: 2023-11-01

## 2023-11-01 NOTE — PATIENT INSTRUCTIONS
Further recommendations to follow based on labs, may need to restart Crestor at 5 mg daily (1/2 tablet).  Continue Zetia as planned    Continue all meds as planned.  Doing a great job!

## 2023-11-01 NOTE — PROGRESS NOTES
Chief Complaint  Diabetes (Check up med refills patient is fasting), Insomnia, Hyperlipidemia, Hypertension, and Hypothyroidism    4-month follow-up on chronic insomnia, DM, CKD, hyperlipidemia, HTN, hypothyroidism, and OAB    LOV with me in June for chronic med refills and uncontrolled lipids  -- A1c 6.5, no changes with diabetic meds  -- All routine labs stable, GFR 49, except for lipids yet intolerant to multiple meds.  -- Plan was to continue low-dose Crestor 10 mg and add Zetia due to LDL greater than 100      Overall, continues to do well.  However, she decided she just could not handle the Crestor at all due to the myalgias so she stopped taking it altogether a few months ago.  She did start the Zetia and seems to be tolerating this.  Still lives a very healthy and active lifestyle, walks 2 miles per day nearly every day!!  Mood remains good.    Sleeping fine with Ambien, generally 1/2 tablet (5 mg) nightly and on occasions may take 10 mg.  No longer taking trazodone, did not like the side effects.  Weight remains stable.  No chest pain, SOA.     Does not check blood sugars routinely.  No changes made with Amaryl 4 mg daily in quite some time.  No further hypoglycemic episodes.  Denies polyuria, polydipsia.     No new complaints or concerns today, except for her overactive bladder.  Still with urgency, at times may leak before getting to the restroom.  However, really does not want medication.  Needs chronic med refills.  Due for repeat fasting labs, only on Zetia 10 mg now.  Compliant with and tolerates all medications without side effects, except for the statins and trazodone.      Review of Systems   Constitutional:  Negative for fever and unexpected weight change.   Respiratory:  Negative for cough and shortness of breath.    Cardiovascular:  Negative for chest pain.        Subjective          Lisbet MO Wilks presents to [facility]    Objective   Vital Signs:   Vitals:     "11/01/23 1121   BP: 122/68   BP Location: Left arm   Patient Position: Sitting   Cuff Size: Adult   Pulse: 70   Temp: 97.8 °F (36.6 °C)   SpO2: 98%   Weight: 62.4 kg (137 lb 9.6 oz)   Height: 157.5 cm (62\")      Body mass index is 25.17 kg/m².   Physical Exam  Vitals and nursing note reviewed.   Constitutional:       Appearance: Normal appearance. She is well-developed.   HENT:      Head: Normocephalic and atraumatic.      Nose: Nose normal.   Eyes:      Conjunctiva/sclera: Conjunctivae normal.      Pupils: Pupils are equal, round, and reactive to light.   Neck:      Thyroid: No thyromegaly.   Cardiovascular:      Rate and Rhythm: Normal rate and regular rhythm.      Heart sounds: Normal heart sounds. No murmur heard.  Pulmonary:      Effort: Pulmonary effort is normal.      Breath sounds: Normal breath sounds.   Abdominal:      General: Abdomen is flat. Bowel sounds are normal. There is no distension.      Palpations: Abdomen is soft. There is no hepatomegaly, splenomegaly or mass.      Tenderness: There is no abdominal tenderness. There is no guarding or rebound.      Hernia: No hernia is present.   Musculoskeletal:         General: Normal range of motion.      Cervical back: Normal range of motion and neck supple.      Right lower leg: No edema.      Left lower leg: No edema.   Lymphadenopathy:      Cervical: No cervical adenopathy.   Skin:     General: Skin is warm.   Neurological:      General: No focal deficit present.      Mental Status: She is alert.   Psychiatric:         Mood and Affect: Mood normal.         Behavior: Behavior normal.         Thought Content: Thought content normal.         Judgment: Judgment normal.        Result Review :     Common labs          1/17/2023    09:59 1/17/2023    10:31 1/17/2023    10:56 6/13/2023    15:02 6/13/2023    15:03 11/1/2023    11:44 11/1/2023    12:07   Common Labs   Glucose  97    66   82    BUN  22    29   34    Creatinine  1.08    1.08   1.13    Sodium  141    " "142   140    Potassium  4.8    5.0   4.9    Chloride  105    105   105    Calcium  9.6    10.3   9.3    Total Protein  7.4    7.5      Albumin  4.4    4.7      Total Bilirubin  0.8    0.6      Alkaline Phosphatase  67    62      AST (SGOT)  14    20      ALT (SGPT)  9    13      WBC  9.94         Hemoglobin  14.7         Hematocrit  43.6         Platelets  292         Total Cholesterol  220    151   228    Triglycerides  157    156   178    HDL Cholesterol  51    53   45    LDL Cholesterol   141    71   151    Hemoglobin A1C 6.6    6.5   6.7     Microalbumin, Urine   57.7          TSH          1/17/2023    10:31 11/1/2023    12:07   TSH   TSH 1.410  0.507      A1C Last 3 Results          1/17/2023    09:59 6/13/2023    15:02 11/1/2023    11:44   HGBA1C Last 3 Results   Hemoglobin A1C 6.6  6.5  6.7      Microalbumin          1/17/2023    10:56   Microalbumin   Microalbumin, Urine 57.7        No results found for: \"ANADIRECT\", \"FERRITIN\", \"TESTOSTEROTT\", \"[id number]\", \"FOLATE\", \"RF\", \"BNP\"                Assessment and Plan    Diagnoses and all orders for this visit:    1. Primary insomnia (Primary) --controlled  Urine Tox screen/January and controlled substance contract up-to-date.  [facility] report reviewed, appropriate.  Very low risk patient behavior.  Continue/refill Ambien as directed below for 90-day supply.  -     zolpidem (AMBIEN) 10 MG tablet; TAKE 1/2  OR 1  TABLET  NIGHTLY AS NEEDED  Dispense: 90 tablet; Refill: 0    2. Mixed hyperlipidemia --uncontrolled  Likely uncontrolled given no longer on statin/Crestor?  Currently only taking Zetia 10 mg daily.  Recheck lipids today.  Ideally goal LDL needs to be less than 70 given history of DM and CAD.  However, advanced age.  May possibly consider adding Crestor 5 mg?  Maybe 3 days/week?  Further recommendations to follow  Continue with healthy lifestyle ---Needs low-carb/low calorie/low cholesterol diet and increase cardio exercise to greater than 150 minutes weekly "   -     Lipid Panel With LDL / HDL Ratio    3. Type 2 diabetes mellitus without complication, without long-term current use of insulin --controlled, A1c 6.7  No change with Amaryl  -     POC Glycosylated Hemoglobin (Hb A1C)    4. Acquired hypothyroidism --appears controlled  Due to recheck TSH  If therapeutic plan to continue Synthroid at 75 mcg every morning  -     TSH    5. Stage 3a chronic kidney disease --stable  Continue to avoid NSAIDs.  Continue to monitor  -     Basic Metabolic Panel    6. OAB (overactive bladder) --stable, declines treatment        Follow Up   Return in about 3 months (around 2/1/2024) for Medicare Wellness, 3 to 4-month follow-up is fine.  Patient was given instructions and counseling regarding her condition or for health maintenance advice. Please see specific information pulled into the AVS if appropriate.

## 2023-11-02 LAB
BUN SERPL-MCNC: 34 MG/DL (ref 8–27)
BUN/CREAT SERPL: 30 (ref 12–28)
CALCIUM SERPL-MCNC: 9.3 MG/DL (ref 8.7–10.3)
CHLORIDE SERPL-SCNC: 105 MMOL/L (ref 96–106)
CHOLEST SERPL-MCNC: 228 MG/DL (ref 100–199)
CO2 SERPL-SCNC: 22 MMOL/L (ref 20–29)
CREAT SERPL-MCNC: 1.13 MG/DL (ref 0.57–1)
EGFRCR SERPLBLD CKD-EPI 2021: 47 ML/MIN/1.73
GLUCOSE SERPL-MCNC: 82 MG/DL (ref 70–99)
HDLC SERPL-MCNC: 45 MG/DL
LDLC SERPL CALC-MCNC: 151 MG/DL (ref 0–99)
LDLC/HDLC SERPL: 3.4 RATIO (ref 0–3.2)
POTASSIUM SERPL-SCNC: 4.9 MMOL/L (ref 3.5–5.2)
SODIUM SERPL-SCNC: 140 MMOL/L (ref 134–144)
TRIGL SERPL-MCNC: 178 MG/DL (ref 0–149)
TSH SERPL DL<=0.005 MIU/L-ACNC: 0.51 UIU/ML (ref 0.45–4.5)
VLDLC SERPL CALC-MCNC: 32 MG/DL (ref 5–40)

## 2024-02-16 ENCOUNTER — OFFICE VISIT (OUTPATIENT)
Dept: FAMILY MEDICINE CLINIC | Facility: CLINIC | Age: 89
End: 2024-02-16
Payer: MEDICARE

## 2024-02-16 VITALS
OXYGEN SATURATION: 99 % | HEART RATE: 68 BPM | SYSTOLIC BLOOD PRESSURE: 110 MMHG | BODY MASS INDEX: 24.66 KG/M2 | WEIGHT: 134 LBS | TEMPERATURE: 97.6 F | DIASTOLIC BLOOD PRESSURE: 62 MMHG | HEIGHT: 62 IN

## 2024-02-16 DIAGNOSIS — E78.2 MIXED HYPERLIPIDEMIA: ICD-10-CM

## 2024-02-16 DIAGNOSIS — E03.9 ACQUIRED HYPOTHYROIDISM: ICD-10-CM

## 2024-02-16 DIAGNOSIS — F51.01 PRIMARY INSOMNIA: ICD-10-CM

## 2024-02-16 DIAGNOSIS — E11.9 TYPE 2 DIABETES MELLITUS WITHOUT COMPLICATION, WITHOUT LONG-TERM CURRENT USE OF INSULIN: ICD-10-CM

## 2024-02-16 DIAGNOSIS — Z79.899 HIGH RISK MEDICATION USE: ICD-10-CM

## 2024-02-16 DIAGNOSIS — Z00.00 ENCOUNTER FOR MEDICARE ANNUAL WELLNESS EXAM: Primary | ICD-10-CM

## 2024-02-16 DIAGNOSIS — N18.31 STAGE 3A CHRONIC KIDNEY DISEASE: ICD-10-CM

## 2024-02-16 DIAGNOSIS — Z12.31 ENCOUNTER FOR SCREENING MAMMOGRAM FOR BREAST CANCER: ICD-10-CM

## 2024-02-16 RX ORDER — ZOLPIDEM TARTRATE 10 MG/1
TABLET ORAL
Qty: 90 TABLET | Refills: 0 | Status: SHIPPED | OUTPATIENT
Start: 2024-02-16

## 2024-02-16 RX ORDER — ROSUVASTATIN CALCIUM 5 MG/1
5 TABLET, COATED ORAL DAILY
Qty: 90 TABLET | Refills: 1 | Status: SHIPPED | OUTPATIENT
Start: 2024-02-16

## 2024-02-20 LAB
ALBUMIN/CREAT UR: 116 MG/G CREAT (ref 0–29)
CREAT UR-MCNC: 59.8 MG/DL
MICROALBUMIN UR-MCNC: 69.2 UG/ML

## 2024-02-26 RX ORDER — LISINOPRIL 20 MG/1
20 TABLET ORAL DAILY
Qty: 90 TABLET | Refills: 1 | Status: SHIPPED | OUTPATIENT
Start: 2024-02-26

## 2024-02-28 LAB
6MAM UR QL CFM: NEGATIVE
6MAM/CREAT UR: NOT DETECTED NG/MG CREAT
7AMINOCLONAZEPAM/CREAT UR: NOT DETECTED NG/MG CREAT
A-OH ALPRAZ/CREAT UR: NOT DETECTED NG/MG CREAT
A-OH-TRIAZOLAM/CREAT UR CFM: NOT DETECTED NG/MG CREAT
ALBUMIN SERPL-MCNC: 4.5 G/DL (ref 3.5–5.2)
ALBUMIN/GLOB SERPL: 1.8 G/DL
ALFENTANIL/CREAT UR CFM: NOT DETECTED NG/MG CREAT
ALP SERPL-CCNC: 74 U/L (ref 39–117)
ALPHA-HYDROXYMIDAZOLAM, URINE: NOT DETECTED NG/MG CREAT
ALPRAZ/CREAT UR CFM: NOT DETECTED NG/MG CREAT
ALT SERPL-CCNC: 7 U/L (ref 1–33)
AMOBARBITAL UR QL CFM: NOT DETECTED
AMPHET/CREAT UR: NOT DETECTED NG/MG CREAT
AMPHETAMINES UR QL CFM: NEGATIVE
AST SERPL-CCNC: 14 U/L (ref 1–32)
BARBITAL UR QL CFM: NOT DETECTED
BARBITURATES UR QL CFM: NEGATIVE
BASOPHILS # BLD AUTO: NORMAL 10*3/UL
BASOPHILS # BLD MANUAL: 0 10*3/MM3 (ref 0–0.2)
BASOPHILS NFR BLD MANUAL: 0 % (ref 0–1.5)
BENZODIAZ UR QL CFM: NEGATIVE
BILIRUB SERPL-MCNC: 0.6 MG/DL (ref 0–1.2)
BUN SERPL-MCNC: 22 MG/DL (ref 8–23)
BUN/CREAT SERPL: 22.9 (ref 7–25)
BUPRENORPHINE UR QL CFM: NEGATIVE
BUPRENORPHINE/CREAT UR: NOT DETECTED NG/MG CREAT
BUTABARBITAL UR QL CFM: NOT DETECTED
BUTALBITAL UR QL CFM: NOT DETECTED
BZE/CREAT UR: NOT DETECTED NG/MG CREAT
CALCIUM SERPL-MCNC: 9.4 MG/DL (ref 8.6–10.5)
CANNABINOIDS UR QL CFM: NEGATIVE
CARBOXYTHC/CREAT UR: NOT DETECTED NG/MG CREAT
CHLORIDE SERPL-SCNC: 104 MMOL/L (ref 98–107)
CHOLEST SERPL-MCNC: 216 MG/DL (ref 0–200)
CLONAZEPAM/CREAT UR CFM: NOT DETECTED NG/MG CREAT
CO2 SERPL-SCNC: 23.5 MMOL/L (ref 22–29)
COCAETHYLENE/CREAT UR CFM: NOT DETECTED NG/MG CREAT
COCAINE UR QL CFM: NEGATIVE
COCAINE/CREAT UR CFM: NOT DETECTED NG/MG CREAT
CODEINE/CREAT UR: NOT DETECTED NG/MG CREAT
CREAT SERPL-MCNC: 0.96 MG/DL (ref 0.57–1)
CREAT UR-MCNC: 63 MG/DL
DESALKYLFLURAZ/CREAT UR: NOT DETECTED NG/MG CREAT
DESMETHYLFLUNITRAZEPAM: NOT DETECTED NG/MG CREAT
DHC/CREAT UR: NOT DETECTED NG/MG CREAT
DIAZEPAM/CREAT UR: NOT DETECTED NG/MG CREAT
DIFFERENTIAL COMMENT: ABNORMAL
DRUGS UR: NORMAL
EDDP/CREAT UR: NOT DETECTED NG/MG CREAT
EGFRCR SERPLBLD CKD-EPI 2021: 56.7 ML/MIN/1.73
EOSINOPHIL # BLD AUTO: NORMAL 10*3/UL
EOSINOPHIL # BLD MANUAL: 0.75 10*3/MM3 (ref 0–0.4)
EOSINOPHIL NFR BLD AUTO: NORMAL %
EOSINOPHIL NFR BLD MANUAL: 9 % (ref 0.3–6.2)
ERYTHROCYTE [DISTWIDTH] IN BLOOD BY AUTOMATED COUNT: 13 % (ref 12.3–15.4)
ETHANOL UR CFM-MCNC: NOT DETECTED G/DL
ETHANOL UR QL CFM: NEGATIVE
FENTANYL UR QL CFM: NEGATIVE
FENTANYL/CREAT UR: NOT DETECTED NG/MG CREAT
FLUNITRAZEPAM UR QL CFM: NOT DETECTED NG/MG CREAT
GLOBULIN SER CALC-MCNC: 2.5 GM/DL
GLUCOSE SERPL-MCNC: 73 MG/DL (ref 65–99)
HCT VFR BLD AUTO: 42.2 % (ref 34–46.6)
HDLC SERPL-MCNC: 44 MG/DL (ref 40–60)
HGB BLD-MCNC: 14 G/DL (ref 12–15.9)
HYDROCODONE/CREAT UR: NOT DETECTED NG/MG CREAT
HYDROMORPHONE/CREAT UR: NOT DETECTED NG/MG CREAT
LDLC SERPL CALC-MCNC: 141 MG/DL (ref 0–100)
LDLC/HDLC SERPL: 3.13 {RATIO}
LORAZEPAM/CREAT UR: NOT DETECTED NG/MG CREAT
LYMPHOCYTES # BLD AUTO: NORMAL 10*3/UL
LYMPHOCYTES # BLD MANUAL: 4.4 10*3/MM3 (ref 0.7–3.1)
LYMPHOCYTES NFR BLD AUTO: NORMAL %
LYMPHOCYTES NFR BLD MANUAL: 53 % (ref 19.6–45.3)
MCH RBC QN AUTO: 29.4 PG (ref 26.6–33)
MCHC RBC AUTO-ENTMCNC: 33.2 G/DL (ref 31.5–35.7)
MCV RBC AUTO: 88.7 FL (ref 79–97)
MDA/CREAT UR: NOT DETECTED NG/MG CREAT
MDMA/CREAT UR: NOT DETECTED NG/MG CREAT
MEPHOBARBITAL UR QL CFM: NOT DETECTED
METHADONE UR QL CFM: NEGATIVE
METHADONE/CREAT UR: NOT DETECTED NG/MG CREAT
METHAMPHET/CREAT UR: NOT DETECTED NG/MG CREAT
MIDAZOLAM/CREAT UR CFM: NOT DETECTED NG/MG CREAT
MONOCYTES # BLD MANUAL: 1.25 10*3/MM3 (ref 0.1–0.9)
MONOCYTES NFR BLD AUTO: NORMAL %
MONOCYTES NFR BLD MANUAL: 15 % (ref 5–12)
MORPHINE/CREAT UR: NOT DETECTED NG/MG CREAT
N-NORTRAMADOL/CREAT UR CFM: NOT DETECTED NG/MG CREAT
NARCOTICS UR: NEGATIVE
NEUTROPHILS # BLD MANUAL: 1.91 10*3/MM3 (ref 1.7–7)
NEUTROPHILS NFR BLD AUTO: NORMAL %
NEUTROPHILS NFR BLD MANUAL: 23 % (ref 42.7–76)
NORBUPRENORPHINE/CREAT UR: NOT DETECTED NG/MG CREAT
NORCODEINE/CREAT UR CFM: NOT DETECTED NG/MG CREAT
NORDIAZEPAM/CREAT UR: NOT DETECTED NG/MG CREAT
NORFENTANYL/CREAT UR: NOT DETECTED NG/MG CREAT
NORHYDROCODONE/CREAT UR: NOT DETECTED NG/MG CREAT
NORMORPHINE UR-MCNC: NOT DETECTED NG/MG CREAT
NOROXYCODONE/CREAT UR: NOT DETECTED NG/MG CREAT
NOROXYMORPHONE/CREAT UR CFM: NOT DETECTED NG/MG CREAT
O-NORTRAMADOL UR CFM-MCNC: NOT DETECTED NG/MG CREAT
OPIATES UR QL CFM: NEGATIVE
OXAZEPAM/CREAT UR: NOT DETECTED NG/MG CREAT
OXYCODONE UR QL CFM: NEGATIVE
OXYCODONE/CREAT UR: NOT DETECTED NG/MG CREAT
OXYMORPHONE/CREAT UR: NOT DETECTED NG/MG CREAT
PENTOBARB UR QL CFM: NOT DETECTED
PHENOBARB UR QL CFM: NOT DETECTED
PLATELET # BLD AUTO: 239 10*3/MM3 (ref 140–450)
PLATELET BLD QL SMEAR: ABNORMAL
POTASSIUM SERPL-SCNC: 4.4 MMOL/L (ref 3.5–5.2)
PROT SERPL-MCNC: 7 G/DL (ref 6–8.5)
RBC # BLD AUTO: 4.76 10*6/MM3 (ref 3.77–5.28)
RBC MORPH BLD: ABNORMAL
SECOBARBITAL UR QL CFM: NOT DETECTED
SERVICE CMNT 02-IMP: NORMAL
SODIUM SERPL-SCNC: 141 MMOL/L (ref 136–145)
SUFENTANIL/CREAT UR CFM: NOT DETECTED NG/MG CREAT
TAPENTADOL UR QL CFM: NEGATIVE
TAPENTADOL/CREAT UR: NOT DETECTED NG/MG CREAT
TEMAZEPAM/CREAT UR: NOT DETECTED NG/MG CREAT
THIOPENTAL UR QL CFM: NOT DETECTED
TRAMADOL UR QL CFM: NOT DETECTED NG/MG CREAT
TRIGL SERPL-MCNC: 171 MG/DL (ref 0–150)
VLDLC SERPL CALC-MCNC: 31 MG/DL (ref 5–40)
WBC # BLD AUTO: 8.3 10*3/MM3 (ref 3.4–10.8)

## 2024-04-18 DIAGNOSIS — F51.01 PRIMARY INSOMNIA: ICD-10-CM

## 2024-04-19 NOTE — TELEPHONE ENCOUNTER
Rx Refill Note  Requested Prescriptions     Pending Prescriptions Disp Refills    zolpidem (AMBIEN) 10 MG tablet [Pharmacy Med Name: ZOLPIDEM TARTRATE 10 MG Tablet] 90 tablet 0     Sig: TAKE 1/2 TO 1 TABLET EVERY NIGHT AS NEEDED    hydrOXYzine (ATARAX) 25 MG tablet [Pharmacy Med Name: HYDROXYZINE HYDROCHLORIDE 25 MG Tablet] 90 tablet 3     Sig: TAKE 1 TABLET AT BEDTIME      Last office visit with prescribing clinician: 2/16/2024   Last telemedicine visit with prescribing clinician: Visit date not found   Next office visit with prescribing clinician: 5/31/2024                         Would you like a call back once the refill request has been completed: [] Yes [] No    If the office needs to give you a call back, can they leave a voicemail: [] Yes [] No    Emil Herman CMA/LMR  04/19/24, 07:35 EDT

## 2024-04-22 RX ORDER — ZOLPIDEM TARTRATE 10 MG/1
TABLET ORAL
Qty: 90 TABLET | Refills: 0 | OUTPATIENT
Start: 2024-04-22

## 2024-04-22 RX ORDER — HYDROXYZINE HYDROCHLORIDE 25 MG/1
25 TABLET, FILM COATED ORAL
Qty: 90 TABLET | Refills: 3 | Status: SHIPPED | OUTPATIENT
Start: 2024-04-22

## 2024-05-31 ENCOUNTER — OFFICE VISIT (OUTPATIENT)
Dept: FAMILY MEDICINE CLINIC | Facility: CLINIC | Age: 89
End: 2024-05-31
Payer: MEDICARE

## 2024-05-31 VITALS
HEIGHT: 62 IN | WEIGHT: 135 LBS | HEART RATE: 74 BPM | DIASTOLIC BLOOD PRESSURE: 70 MMHG | BODY MASS INDEX: 24.84 KG/M2 | TEMPERATURE: 98 F | SYSTOLIC BLOOD PRESSURE: 122 MMHG | OXYGEN SATURATION: 99 %

## 2024-05-31 DIAGNOSIS — F51.01 PRIMARY INSOMNIA: ICD-10-CM

## 2024-05-31 DIAGNOSIS — E03.9 ACQUIRED HYPOTHYROIDISM: ICD-10-CM

## 2024-05-31 DIAGNOSIS — M79.672 LEFT FOOT PAIN: ICD-10-CM

## 2024-05-31 DIAGNOSIS — E11.9 TYPE 2 DIABETES MELLITUS WITHOUT COMPLICATION, WITHOUT LONG-TERM CURRENT USE OF INSULIN: Primary | ICD-10-CM

## 2024-05-31 DIAGNOSIS — E78.2 MIXED HYPERLIPIDEMIA: ICD-10-CM

## 2024-05-31 PROBLEM — N18.31 STAGE 3A CHRONIC KIDNEY DISEASE: Status: RESOLVED | Noted: 2021-12-01 | Resolved: 2024-05-31

## 2024-05-31 LAB
EXPIRATION DATE: ABNORMAL
HBA1C MFR BLD: 7.6 % (ref 4.5–5.7)
Lab: ABNORMAL

## 2024-05-31 PROCEDURE — 83036 HEMOGLOBIN GLYCOSYLATED A1C: CPT | Performed by: FAMILY MEDICINE

## 2024-05-31 PROCEDURE — 1160F RVW MEDS BY RX/DR IN RCRD: CPT | Performed by: FAMILY MEDICINE

## 2024-05-31 PROCEDURE — 1159F MED LIST DOCD IN RCRD: CPT | Performed by: FAMILY MEDICINE

## 2024-05-31 PROCEDURE — 3051F HG A1C>EQUAL 7.0%<8.0%: CPT | Performed by: FAMILY MEDICINE

## 2024-05-31 PROCEDURE — 1126F AMNT PAIN NOTED NONE PRSNT: CPT | Performed by: FAMILY MEDICINE

## 2024-05-31 PROCEDURE — 99214 OFFICE O/P EST MOD 30 MIN: CPT | Performed by: FAMILY MEDICINE

## 2024-05-31 RX ORDER — ZOLPIDEM TARTRATE 10 MG/1
TABLET ORAL
Qty: 90 TABLET | Refills: 0 | Status: SHIPPED | OUTPATIENT
Start: 2024-05-31

## 2024-05-31 RX ORDER — MELOXICAM 15 MG/1
15 TABLET ORAL DAILY
Qty: 30 TABLET | Refills: 3 | Status: CANCELLED
Start: 2024-05-31

## 2024-05-31 NOTE — PATIENT INSTRUCTIONS
Get lab work done within the next 2 weeks, fasting    May take Advil or Aleve 2 times per day x 1 week for left foot strain    Needs to improve upon diabetic diet --Recommend low fat/low calorie diet and exercise greater than 150 minutes of cardio per week.      Continue current treatment plan.

## 2024-05-31 NOTE — PROGRESS NOTES
"Chief Complaint  Insomnia (3 MONTHS CHECK UP MED REFILLS), Diabetes, Hyperlipidemia, Hypothyroidism, Hypertension, and Foot Pain (LEFT FOOT PAIN NO INJURY STILL WALKING GOING ON COUPLE OF DAYS)    3-month follow-up on DM, hyperlipidemia, HTN, chronic insomnia, and acute left foot pain x 3 days    Last visit with me in February for Medicare wellness, chronic med refills with labs, and uncontrolled hyperlipidemia.  --Screening mammogram order placed, however not completed  -- A1c good at 6.9, no med changes made  -- Started on low-dose Crestor at 5 mg given persistently uncontrolled LDL in the setting of DM.  Continued on Zetia  -- Routine 6-month labs updated and microalbumin done.  Microalbumin positive, therefore called and asked to increase her lisinopril to 20 mg daily.    She was compliant with all the above recommendations.  Has restarted Crestor at 5 mg daily and is tolerating without side effects.  Also, she did increase lisinopril to 20 mg daily and is tolerating without side effects/cough.  No mammogram done.      Overall, continues to do very well.  No falls, ER visits, or hospitalizations.  Still lives a very healthy and active lifestyle, walks 2 miles per day at least 4 days/week.  Goes to the gym with her girlfriends.  Mood remains good.    Sleeping fine with Ambien, generally 1/2 tablet (5 mg) nightly and on occasions may take 10 mg.  No longer taking trazodone, did not like the side effects.  Weight remains stable.  No chest pain, SOA.     Does not check blood sugars routinely.  Admits her diet has not been so good during recent months, more sweets and eating out.    No changes made with Amaryl 4 mg daily in quite some time.  No further hypoglycemic episodes.  Denies polyuria, polydipsia.     Only new complaint today is some mild left foot pain x 3 days only.  This is on the \"top of her foot\" near the ankle joint.  No known injury.  Still doing her usual and normal amount of walking.  Little " "discomfort.  No meds tried.    Needs chronic med refills.  Due for repeat fasting labs, although nonfasting today.  Compliant with and tolerates all medications without side effects, except for high-dose statins and trazodone.        Review of Systems   Constitutional:  Negative for fever and unexpected weight change.   Respiratory:  Negative for cough and shortness of breath.    Cardiovascular:  Negative for chest pain.        Fermin Wilks presents to [facility]    Objective   Vital Signs:   Vitals:    05/31/24 1452   BP: 122/70   BP Location: Left arm   Patient Position: Sitting   Cuff Size: Adult   Pulse: 74   Temp: 98 °F (36.7 °C)   SpO2: 99%   Weight: 61.2 kg (135 lb)   Height: 157.5 cm (62\")      Body mass index is 24.69 kg/m².   Physical Exam  Vitals and nursing note reviewed.   Constitutional:       Appearance: Normal appearance. She is well-developed.   HENT:      Head: Normocephalic and atraumatic.      Nose: Nose normal.   Eyes:      Conjunctiva/sclera: Conjunctivae normal.      Pupils: Pupils are equal, round, and reactive to light.   Neck:      Thyroid: No thyromegaly.   Cardiovascular:      Rate and Rhythm: Normal rate and regular rhythm.      Heart sounds: Normal heart sounds. No murmur heard.  Pulmonary:      Effort: Pulmonary effort is normal. No respiratory distress.      Breath sounds: Normal breath sounds. No wheezing or rales.   Abdominal:      General: Abdomen is flat. Bowel sounds are normal. There is no distension.      Palpations: Abdomen is soft. There is no hepatomegaly, splenomegaly or mass.      Tenderness: There is no abdominal tenderness. There is no guarding or rebound.      Hernia: No hernia is present.   Musculoskeletal:         General: Normal range of motion.      Cervical back: Normal range of motion and neck supple.      Right lower leg: No edema.      Left lower leg: No edema.   Feet:      Comments: Left foot --mild " "tenderness with range of motion in the anterior ankle joint.  No edema, normal range of motion, normal strength  Lymphadenopathy:      Cervical: No cervical adenopathy.   Skin:     General: Skin is warm.   Neurological:      General: No focal deficit present.      Mental Status: She is alert.   Psychiatric:         Mood and Affect: Mood normal.         Behavior: Behavior normal.         Thought Content: Thought content normal.         Judgment: Judgment normal.        Result Review :     Common labs          11/1/2023    11:44 11/1/2023    12:07 2/16/2024    15:00 2/16/2024    15:22 5/31/2024    15:15   Common Labs   Glucose  82  73      BUN  34  22      Creatinine  1.13  0.96      Sodium  140  141      Potassium  4.9  4.4      Chloride  105  104      Calcium  9.3  9.4      Total Protein   7.0      Albumin   4.5      Total Bilirubin   0.6      Alkaline Phosphatase   74      AST (SGOT)   14      ALT (SGPT)   7      WBC   8.30      Hemoglobin   14.0      Hematocrit   42.2      Platelets   239      Total Cholesterol  228  216      Triglycerides  178  171      HDL Cholesterol  45  44      LDL Cholesterol   151  141      Hemoglobin A1C 6.7     7.6    Microalbumin, Urine    69.2       Lipid Panel          6/13/2023    15:03 11/1/2023    12:07 2/16/2024    15:00   Lipid Panel   Total Cholesterol 151  228  216    Triglycerides 156  178  171    HDL Cholesterol 53  45  44    VLDL Cholesterol 27  32  31    LDL Cholesterol  71  151  141    LDL/HDL Ratio 1.26  3.4  3.13      TSH          11/1/2023    12:07   TSH   TSH 0.507      A1C Last 3 Results          6/13/2023    15:02 11/1/2023    11:44 5/31/2024    15:15   HGBA1C Last 3 Results   Hemoglobin A1C 6.5  6.7  7.6      Microalbumin          2/16/2024    15:22   Microalbumin   Microalbumin, Urine 69.2        No results found for: \"ANADIRECT\", \"FERRITIN\", \"TESTOSTEROTT\", \"[id number]\", \"FOLATE\", \"RF\", \"BNP\"                Assessment and Plan    Diagnoses and all orders for this " visit:    1. Type 2 diabetes mellitus without complication, without long-term current use of insulin (Primary) --slightly uncontrolled, A1c 7.6  Little worse than usual.  Yet given age and history of hypoglycemic episodes with increased dose of Amaryl, no med changes will be made.  Needs to improve upon her diabetic diet.  Continue with her healthy lifestyle.  S/P recent microalbumin, positive, lisinopril was increased to 20 mg daily after last visit.  BP good.  -     POC Glycosylated Hemoglobin (Hb A1C)    2. Mixed hyperlipidemia --uncontrolled  Hopefully improved since restarting low-dose Crestor 5 mg daily.  Due to recheck LFTs and lipids.  Ideally goal LDL needs to be less than 70 given history of diabetes, at least less than 100 if possible.  Plan to continue both Crestor 5 mg daily and Zetia 10 mg daily.  Intolerant to higher dose statins.  -     Comprehensive Metabolic Panel; Future  -     Lipid Panel With LDL / HDL Ratio; Future    3. Acquired hypothyroidism --controlled due to recheck TSH   if therapeutic plan to continue Synthroid at 75 mcg every morning  -     TSH; Future    4. Primary insomnia --controlled  Urine Tox screen and controlled substance contract up-to-date/February 2024, appropriate.  [facility] report reviewed, appropriate.  May continue Ambien as directed below, due for 90-day supply.  -     zolpidem (AMBIEN) 10 MG tablet; TAKE 1/2  OR 1  TABLET  NIGHTLY AS NEEDED  Dispense: 90 tablet; Refill: 0    5. Left foot pain --new Dx, mild.  X 3 days only.  No injury.  Suspect strain only.  Treat conservatively, may take Advil or Aleve twice daily x 5 to 7 days then as needed.  Normal CBC and renal function.        Follow Up   Return in about 3 months (around 8/31/2024) for Recheck.  Patient was given instructions and counseling regarding her condition or for health maintenance advice. Please see specific information pulled into the AVS if appropriate.

## 2024-07-05 RX ORDER — ROSUVASTATIN CALCIUM 5 MG/1
5 TABLET, COATED ORAL DAILY
Qty: 90 TABLET | Refills: 3 | Status: SHIPPED | OUTPATIENT
Start: 2024-07-05

## 2024-07-22 RX ORDER — LISINOPRIL 20 MG/1
20 TABLET ORAL DAILY
Qty: 90 TABLET | Refills: 3 | Status: SHIPPED | OUTPATIENT
Start: 2024-07-22

## 2024-07-22 RX ORDER — ATENOLOL 25 MG/1
TABLET ORAL
Qty: 180 TABLET | Refills: 3 | Status: SHIPPED | OUTPATIENT
Start: 2024-07-22

## 2024-10-04 ENCOUNTER — OFFICE VISIT (OUTPATIENT)
Dept: FAMILY MEDICINE CLINIC | Facility: CLINIC | Age: 89
End: 2024-10-04
Payer: MEDICARE

## 2024-10-04 VITALS
SYSTOLIC BLOOD PRESSURE: 110 MMHG | HEIGHT: 62 IN | OXYGEN SATURATION: 98 % | HEART RATE: 72 BPM | BODY MASS INDEX: 24.48 KG/M2 | WEIGHT: 133 LBS | DIASTOLIC BLOOD PRESSURE: 62 MMHG | TEMPERATURE: 97.7 F

## 2024-10-04 DIAGNOSIS — E55.9 VITAMIN D DEFICIENCY: ICD-10-CM

## 2024-10-04 DIAGNOSIS — E11.9 TYPE 2 DIABETES MELLITUS WITHOUT COMPLICATION, WITHOUT LONG-TERM CURRENT USE OF INSULIN: Primary | ICD-10-CM

## 2024-10-04 DIAGNOSIS — E78.2 MIXED HYPERLIPIDEMIA: ICD-10-CM

## 2024-10-04 DIAGNOSIS — R09.82 PND (POST-NASAL DRIP): ICD-10-CM

## 2024-10-04 DIAGNOSIS — F51.01 PRIMARY INSOMNIA: ICD-10-CM

## 2024-10-04 DIAGNOSIS — I25.10 CORONARY ARTERY DISEASE INVOLVING NATIVE HEART WITHOUT ANGINA PECTORIS, UNSPECIFIED VESSEL OR LESION TYPE: ICD-10-CM

## 2024-10-04 DIAGNOSIS — E03.9 ACQUIRED HYPOTHYROIDISM: ICD-10-CM

## 2024-10-04 DIAGNOSIS — Z78.9 MEDICALLY COMPLEX PATIENT: ICD-10-CM

## 2024-10-04 LAB
EXPIRATION DATE: ABNORMAL
HBA1C MFR BLD: 7.2 % (ref 4.5–5.7)
Lab: ABNORMAL

## 2024-10-04 RX ORDER — ZOLPIDEM TARTRATE 10 MG/1
TABLET ORAL
Qty: 90 TABLET | Refills: 0 | Status: SHIPPED | OUTPATIENT
Start: 2024-10-04 | End: 2024-10-04 | Stop reason: SDUPTHER

## 2024-10-04 RX ORDER — ZOLPIDEM TARTRATE 10 MG/1
TABLET ORAL
Qty: 90 TABLET | Refills: 0 | Status: SHIPPED | OUTPATIENT
Start: 2024-10-04

## 2024-10-04 NOTE — PROGRESS NOTES
"Chief Complaint  Diabetes (3 months check up), Insomnia, Hyperlipidemia, and Hypothyroidism    3 to 4-month follow-up on diabetes, hyperlipidemia, CAD, hypothyroidism, and chronic insomnia    Last visit with me in late May 2024 for chronic med refills and uncontrolled DM  -- A1c 7.6, no med changes made given already on maximum dose of glimepiride, history of hypoglycemia, and geriatric age  -- Chronic med refills done, orders given to have fasting labs, LFTs, TSH completed given she has resumed Crestor.    She was compliant with the above recommendations.  States she did go have some fasting lab work done at the [facility] on [facility], however there is no record in the system?  Checked with [facility], no results found??    Continues to do remarkably well.  No falls, ER visits, or hospitalizations.  Still lives a very healthy and active lifestyle, walks 2 miles per day at least 4 days/week.  Goes to the gym with her girlfriends.  Mood remains good.    Sleeping fine with Ambien, generally 1/2 tablet (5 mg) nightly and on occasions may take 10 mg.  No longer taking trazodone, did not like the side effects.  Has not taken hydroxyzine in a while, she tells me she just forgot about this medicine?  Weight remains stable.  No chest pain, SOA.     Does not check blood sugars routinely.  Her diabetic diet has been better during the summer months.   No changes made with Amaryl 4 mg BID in quite some time.  No further hypoglycemic episodes.  Denies polyuria, polydipsia.     Her only complaint today is some mild nasal drainage.  This time of the year, \"constantly clearing her throat.\"  Not taking any antihistamines, has not been taking hydroxyzine nightly?  No nasal sprays.  No illness.      Needs chronic med refills.  Cannot find the lab results from 4 months ago?,  See above HPI.  Compliant with and tolerates all medications without side effects, except for high-dose statins and trazodone.  Also, good compliance with thyroid " "dosing regimen.        Review of Systems   Constitutional:  Negative for fever and unexpected weight change.   Respiratory:  Negative for cough and shortness of breath.    Cardiovascular:  Negative for chest pain.        Subjective          Lisbet Wilks presents to [facility]    Objective   Vital Signs:   Vitals:    10/04/24 1502   BP: 110/62   BP Location: Left arm   Patient Position: Sitting   Cuff Size: Adult   Pulse: 72   Temp: 97.7 °F (36.5 °C)   SpO2: 98%   Weight: 60.3 kg (133 lb)   Height: 157.5 cm (62\")      Body mass index is 24.33 kg/m².   Physical Exam  Vitals and nursing note reviewed.   Constitutional:       Appearance: Normal appearance. She is well-developed and normal weight.   HENT:      Head: Normocephalic and atraumatic.      Comments: Oropharynx --some gray cobblestoning drainage only     Nose: Nose normal.   Eyes:      Conjunctiva/sclera: Conjunctivae normal.      Pupils: Pupils are equal, round, and reactive to light.   Neck:      Thyroid: No thyromegaly.   Cardiovascular:      Rate and Rhythm: Normal rate and regular rhythm.      Heart sounds: Normal heart sounds. No murmur heard.  Pulmonary:      Effort: Pulmonary effort is normal. No respiratory distress.      Breath sounds: Normal breath sounds. No wheezing or rales.   Abdominal:      General: Abdomen is flat. Bowel sounds are normal. There is no distension.      Palpations: Abdomen is soft. There is no hepatomegaly, splenomegaly or mass.      Tenderness: There is no abdominal tenderness. There is no guarding or rebound.      Hernia: No hernia is present.   Musculoskeletal:         General: Normal range of motion.      Cervical back: Normal range of motion and neck supple.      Right lower leg: No edema.      Left lower leg: No edema.   Lymphadenopathy:      Cervical: No cervical adenopathy.   Skin:     General: Skin is warm.   Neurological:      General: No focal deficit present.      Mental Status: She " "is alert.   Psychiatric:         Mood and Affect: Mood normal.         Behavior: Behavior normal.         Thought Content: Thought content normal.         Judgment: Judgment normal.        Result Review :     Common labs          2/16/2024    15:00 2/16/2024    15:22 5/31/2024    15:15 10/4/2024    15:52   Common Labs   Glucose 73       BUN 22       Creatinine 0.96       Sodium 141       Potassium 4.4       Chloride 104       Calcium 9.4       Total Protein 7.0       Albumin 4.5       Total Bilirubin 0.6       Alkaline Phosphatase 74       AST (SGOT) 14       ALT (SGPT) 7       WBC 8.30       Hemoglobin 14.0       Hematocrit 42.2       Platelets 239       Total Cholesterol 216       Triglycerides 171       HDL Cholesterol 44       LDL Cholesterol  141       Hemoglobin A1C   7.6  7.2    Microalbumin, Urine  69.2        Lipid Panel          11/1/2023    12:07 2/16/2024    15:00   Lipid Panel   Total Cholesterol 228  216    Triglycerides 178  171    HDL Cholesterol 45  44    VLDL Cholesterol 32  31    LDL Cholesterol  151  141    LDL/HDL Ratio 3.4  3.13      TSH          11/1/2023    12:07   TSH   TSH 0.507      A1C Last 3 Results          11/1/2023    11:44 5/31/2024    15:15 10/4/2024    15:52   HGBA1C Last 3 Results   Hemoglobin A1C 6.7  7.6  7.2      Microalbumin          2/16/2024    15:22   Microalbumin   Microalbumin, Urine 69.2        No results found for: \"ANADIRECT\", \"FERRITIN\", \"TESTOSTEROTT\", \"[id number]\", \"FOLATE\", \"RF\", \"BNP\"                Assessment and Plan    Diagnoses and all orders for this visit:    1. Type 2 diabetes mellitus without complication, without long-term current use of insulin (Primary) --fair control, A1c 7.2  Improved diabetic control from last visit.  No change with meds, given on maximum dose of glimepiride, history of hypoglycemia, age 89, and comorbidities.  Plan to continue Amaryl 4 mg BID  -     CBC & Differential  -     POC Glycosylated Hemoglobin (Hb A1C)    2. Mixed " hyperlipidemia --controlled?  Need to recheck lipids, CMP, apparently no results from her last lab draw 4 months ago from other location?  Have checked with [facility], nothing in system.  Ideally goal LDL needs to be less than 70 given history of DM and CAD, however intolerant to higher dose statins.  Thus plan to continue Crestor at 5 mg daily and Zetia 10 mg daily, tolerating.  Need LFTs  -     Comprehensive Metabolic Panel  -     Lipid Panel With LDL / HDL Ratio    3. Coronary artery disease involving native heart without angina pectoris, unspecified vessel or lesion type --stable  Continue ASA, beta-blocker, and risk factor control    4. Acquired hypothyroidism -appears controlled  Due to recheck TSH, if therapeutic then no change with Synthroid 75 mcg every morning  -     TSH    5. Vitamin D deficiency  -     Vitamin D,25-Hydroxy    6. Primary insomnia --controlled  Urine Tox screen and controlled substance contract up-to-date/February 2024, appropriate  [place] report reviewed, appropriate.  Very low risk patient behavior.  Would like to see her restart hydroxyzine 25 mg, take 1/2 tablet to 1 whole tablet p.o. nightly (also for her allergies.)  May continue Ambien as directed below, most nights only takes 1/2 tablet, refilled for 90-day supply.  -     Discontinue: zolpidem (AMBIEN) 10 MG tablet; TAKE 1/2  OR 1  TABLET  NIGHTLY AS NEEDED  Dispense: 90 tablet; Refill: 0  -     zolpidem (AMBIEN) 10 MG tablet; TAKE 1/2  OR 1  TABLET  NIGHTLY AS NEEDED  Dispense: 90 tablet; Refill: 0    7. PND (post-nasal drip) --slightly uncontrolled, allergy season  Restart hydroxyzine 25 mg nightly  If not better needs to add OTC Flonase    8. Medically complex patient --see all the above active diagnoses that require close monitoring and longitudinal care        Follow Up   Return in about 4 months (around 2/4/2025) for Medicare Wellness.  Patient was given instructions and counseling regarding her condition or for health  maintenance advice. Please see specific information pulled into the AVS if appropriate.

## 2024-10-04 NOTE — PATIENT INSTRUCTIONS
Recommend low fat/low calorie diet and exercise greater than 150 minutes of cardio per week.      Continue current treatment plan.       Restart hydroxyzine 25 mg, 1/2 tablet to 1 whole tablet at bedtime to help with sleep and allergies

## 2024-10-05 LAB
25(OH)D3+25(OH)D2 SERPL-MCNC: 25.6 NG/ML (ref 30–100)
ALBUMIN SERPL-MCNC: 4.4 G/DL (ref 3.5–5.2)
ALBUMIN/GLOB SERPL: 1.7 G/DL
ALP SERPL-CCNC: 72 U/L (ref 39–117)
ALT SERPL-CCNC: 14 U/L (ref 1–33)
AST SERPL-CCNC: 23 U/L (ref 1–32)
BASOPHILS # BLD AUTO: 0.07 10*3/MM3 (ref 0–0.2)
BASOPHILS NFR BLD AUTO: 0.6 % (ref 0–1.5)
BILIRUB SERPL-MCNC: 0.5 MG/DL (ref 0–1.2)
BUN SERPL-MCNC: 26 MG/DL (ref 8–23)
BUN/CREAT SERPL: 26.8 (ref 7–25)
CALCIUM SERPL-MCNC: 9.5 MG/DL (ref 8.2–9.6)
CHLORIDE SERPL-SCNC: 104 MMOL/L (ref 98–107)
CHOLEST SERPL-MCNC: 164 MG/DL (ref 0–200)
CO2 SERPL-SCNC: 25.2 MMOL/L (ref 22–29)
CREAT SERPL-MCNC: 0.97 MG/DL (ref 0.57–1)
EGFRCR SERPLBLD CKD-EPI 2021: 55.6 ML/MIN/1.73
EOSINOPHIL # BLD AUTO: 0.17 10*3/MM3 (ref 0–0.4)
EOSINOPHIL NFR BLD AUTO: 1.5 % (ref 0.3–6.2)
ERYTHROCYTE [DISTWIDTH] IN BLOOD BY AUTOMATED COUNT: 12.6 % (ref 12.3–15.4)
GLOBULIN SER CALC-MCNC: 2.6 GM/DL
GLUCOSE SERPL-MCNC: 63 MG/DL (ref 65–99)
HCT VFR BLD AUTO: 42 % (ref 34–46.6)
HDLC SERPL-MCNC: 46 MG/DL (ref 40–60)
HGB BLD-MCNC: 13.4 G/DL (ref 12–15.9)
IMM GRANULOCYTES # BLD AUTO: 0.04 10*3/MM3 (ref 0–0.05)
IMM GRANULOCYTES NFR BLD AUTO: 0.4 % (ref 0–0.5)
LDLC SERPL CALC-MCNC: 92 MG/DL (ref 0–100)
LDLC/HDLC SERPL: 1.92 {RATIO}
LYMPHOCYTES # BLD AUTO: 2.95 10*3/MM3 (ref 0.7–3.1)
LYMPHOCYTES NFR BLD AUTO: 26.3 % (ref 19.6–45.3)
MCH RBC QN AUTO: 29.7 PG (ref 26.6–33)
MCHC RBC AUTO-ENTMCNC: 31.9 G/DL (ref 31.5–35.7)
MCV RBC AUTO: 93.1 FL (ref 79–97)
MONOCYTES # BLD AUTO: 0.7 10*3/MM3 (ref 0.1–0.9)
MONOCYTES NFR BLD AUTO: 6.2 % (ref 5–12)
NEUTROPHILS # BLD AUTO: 7.28 10*3/MM3 (ref 1.7–7)
NEUTROPHILS NFR BLD AUTO: 65 % (ref 42.7–76)
NRBC BLD AUTO-RTO: 0 /100 WBC (ref 0–0.2)
PLATELET # BLD AUTO: 249 10*3/MM3 (ref 140–450)
POTASSIUM SERPL-SCNC: 4.8 MMOL/L (ref 3.5–5.2)
PROT SERPL-MCNC: 7 G/DL (ref 6–8.5)
RBC # BLD AUTO: 4.51 10*6/MM3 (ref 3.77–5.28)
SODIUM SERPL-SCNC: 140 MMOL/L (ref 136–145)
TRIGL SERPL-MCNC: 149 MG/DL (ref 0–150)
TSH SERPL DL<=0.005 MIU/L-ACNC: 2.28 UIU/ML (ref 0.27–4.2)
VLDLC SERPL CALC-MCNC: 26 MG/DL (ref 5–40)
WBC # BLD AUTO: 11.21 10*3/MM3 (ref 3.4–10.8)

## 2024-11-02 DIAGNOSIS — E03.9 ACQUIRED HYPOTHYROIDISM: Primary | ICD-10-CM

## 2024-11-04 RX ORDER — LEVOTHYROXINE SODIUM 75 UG/1
TABLET ORAL
Qty: 90 TABLET | Refills: 3 | Status: SHIPPED | OUTPATIENT
Start: 2024-11-04

## 2024-11-27 RX ORDER — GLIMEPIRIDE 4 MG/1
TABLET ORAL
Qty: 180 TABLET | Refills: 3 | Status: SHIPPED | OUTPATIENT
Start: 2024-11-27

## 2025-03-07 ENCOUNTER — OFFICE VISIT (OUTPATIENT)
Dept: FAMILY MEDICINE CLINIC | Facility: CLINIC | Age: OVER 89
End: 2025-03-07
Payer: MEDICARE

## 2025-03-07 VITALS
TEMPERATURE: 97.5 F | DIASTOLIC BLOOD PRESSURE: 70 MMHG | SYSTOLIC BLOOD PRESSURE: 124 MMHG | WEIGHT: 135.4 LBS | HEIGHT: 62 IN | OXYGEN SATURATION: 97 % | HEART RATE: 68 BPM | BODY MASS INDEX: 24.92 KG/M2

## 2025-03-07 DIAGNOSIS — Z00.00 MEDICARE ANNUAL WELLNESS VISIT, SUBSEQUENT: Primary | ICD-10-CM

## 2025-03-07 DIAGNOSIS — Z79.899 HIGH RISK MEDICATION USE: ICD-10-CM

## 2025-03-07 DIAGNOSIS — R11.0 CHRONIC NAUSEA: ICD-10-CM

## 2025-03-07 DIAGNOSIS — E11.65 TYPE 2 DIABETES MELLITUS WITH HYPERGLYCEMIA, WITHOUT LONG-TERM CURRENT USE OF INSULIN: ICD-10-CM

## 2025-03-07 DIAGNOSIS — E78.2 MIXED HYPERLIPIDEMIA: ICD-10-CM

## 2025-03-07 DIAGNOSIS — R09.82 PND (POST-NASAL DRIP): ICD-10-CM

## 2025-03-07 DIAGNOSIS — E55.9 VITAMIN D DEFICIENCY: ICD-10-CM

## 2025-03-07 DIAGNOSIS — I25.10 CAD IN NATIVE ARTERY: ICD-10-CM

## 2025-03-07 DIAGNOSIS — Z78.9 MEDICALLY COMPLEX PATIENT: ICD-10-CM

## 2025-03-07 DIAGNOSIS — E03.9 ACQUIRED HYPOTHYROIDISM: ICD-10-CM

## 2025-03-07 DIAGNOSIS — F51.01 PRIMARY INSOMNIA: ICD-10-CM

## 2025-03-07 DIAGNOSIS — M43.6 STIFFNESS OF NECK: ICD-10-CM

## 2025-03-07 DIAGNOSIS — D72.829 LEUKOCYTOSIS, UNSPECIFIED TYPE: ICD-10-CM

## 2025-03-07 LAB
EXPIRATION DATE: ABNORMAL
HBA1C MFR BLD: 7.9 % (ref 4.5–5.7)
Lab: ABNORMAL

## 2025-03-07 RX ORDER — ONDANSETRON 4 MG/1
4 TABLET, FILM COATED ORAL EVERY 8 HOURS PRN
Qty: 20 TABLET | Refills: 1 | Status: SHIPPED | OUTPATIENT
Start: 2025-03-07

## 2025-03-07 RX ORDER — CYCLOBENZAPRINE HCL 5 MG
5 TABLET ORAL 3 TIMES DAILY PRN
Qty: 20 TABLET | Refills: 1 | Status: SHIPPED | OUTPATIENT
Start: 2025-03-07

## 2025-03-07 RX ORDER — ZOLPIDEM TARTRATE 10 MG/1
TABLET ORAL
Qty: 90 TABLET | Refills: 0 | Status: SHIPPED | OUTPATIENT
Start: 2025-03-07

## 2025-03-07 RX ORDER — PIOGLITAZONE 15 MG/1
15 TABLET ORAL DAILY
Status: CANCELLED | OUTPATIENT
Start: 2025-03-07

## 2025-03-07 RX ORDER — ZOLPIDEM TARTRATE 10 MG/1
TABLET ORAL
Qty: 90 TABLET | Refills: 0 | Status: SHIPPED | OUTPATIENT
Start: 2025-03-07 | End: 2025-03-07 | Stop reason: SDUPTHER

## 2025-03-07 NOTE — ASSESSMENT & PLAN NOTE
Controlled  Doing well with hydroxyzine 25 mg nightly and Ambien 5 mg nightly.  Refill Ambien for 90-day supply.  Urine Tox screen and controlled substance contract up-to-date/due to update Urine Tox screen and contract today.  Appropriate.  JACEY/ INSPECT report reviewed, appropriate.  Adequate pain relief, good medication adherence, and low risk behaviors for misuse.

## 2025-03-07 NOTE — ASSESSMENT & PLAN NOTE
Fair control  Ideally goal LDL needs to be less than 70 given history of diabetes and CAD.  However intolerant to higher dose statins.  Recheck lipids and LFTs today.  Plan to continue Crestor 5 mg daily and Zetia 10 mg daily

## 2025-03-07 NOTE — PATIENT INSTRUCTIONS
May start Flexeril as needed for neck stiffness    May start Zofran as needed for nausea    See ENT doctor, may be all that drainage is leading to nausea    Need to do better with diabetes, diet sweets etc. otherwise we need another medication

## 2025-03-07 NOTE — ASSESSMENT & PLAN NOTE
Uncontrolled, today's A1c 7.9  Suggested adding an additional medication today for diabetes given Amaryl at maximum dose.  However, declines.  Prefers to get back on track with a better diabetic diet.  Failed Actos in past/rash.  Metformin GI intolerance.  Continue Amaryl at 4 mg BID  Due to update urine microalbumin.  Diabetic eye exam up-to-date.  S/P diabetic foot exam done, WNL

## 2025-03-07 NOTE — PROGRESS NOTES
Subjective   The ABCs of the Annual Wellness Visit  Medicare Wellness Visit      Lisbet Wilks is a 89 y.o. patient who presents for a Medicare Wellness Visit.    Routine health maintenance/screening test:  PAP --- nonapplicable, aged out  MAMMO --- February 2023, declines further screening  DEXA --- declines further screening because refuses to take any medication for possible diagnosis of osteoporosis  Colorectal Screen --- C-scope done about age 80.  Declines further screening  Vaccines --- up-to-date per patient (received at pharmacy.) refuses any further immunizations.  Smoking/ETOH Status --- reformed tobacco use.  Social alcohol only  Dentist, Eye Exam, Derm --- maintains regular dental visits and ophthalmology visits (has MD,) no dermatologist  Diet/Exercise --- maintains a diabetic/low-cholesterol diet and regular cardio exercise/enjoys walking.  Pertinent FH --- noncontributory             The following portions of the patient's history were reviewed and   updated as appropriate: allergies, current medications, past family history, past medical history, past social history, past surgical history, and problem list.    Compared to one year ago, the patient's physical   health is the same.  Compared to one year ago, the patient's mental   health is the same.    Recent Hospitalizations:  She was not admitted to the hospital during the last year.     Current Medical Providers:  Patient Care Team:  Aleah Lopes MD as PCP - General  Aleah Lopes MD as PCP - Family Medicine  Damaso Oh MD as Consulting Physician (Ophthalmology)  Marin Arias MD as Consulting Physician (Cardiology)  Kenyon Nicole MD (Obstetrics and Gynecology)  Kenna Tena MD as Consulting Physician (Urogynecology)  David Villalba MD as Consulting Physician (Cardiology)  Dr. Amish Gonzalez, cardiology    Outpatient Medications Prior to Visit   Medication Sig Dispense Refill    aspirin 81 MG tablet Take 1 tablet by  mouth Daily.      atenolol (TENORMIN) 25 MG tablet TAKE 1 TABLET TWICE DAILY 180 tablet 3    Cholecalciferol 125 MCG (5000 UT) tablet Take  by mouth.      ezetimibe (ZETIA) 10 MG tablet Take 1 tablet by mouth Daily.      glimepiride (AMARYL) 4 MG tablet TAKE 1 TABLET TWICE DAILY 180 tablet 3    hydrOXYzine (ATARAX) 25 MG tablet TAKE 1 TABLET AT BEDTIME 90 tablet 3    levothyroxine (SYNTHROID, LEVOTHROID) 75 MCG tablet TAKE 1 TABLET EVERY DAY (DOSE CHANGE) 90 tablet 3    lisinopril (PRINIVIL,ZESTRIL) 20 MG tablet TAKE 1 TABLET EVERY DAY 90 tablet 3    rosuvastatin (CRESTOR) 5 MG tablet TAKE 1 TABLET EVERY DAY 90 tablet 3    zolpidem (AMBIEN) 10 MG tablet TAKE 1/2  OR 1  TABLET  NIGHTLY AS NEEDED 90 tablet 0     No facility-administered medications prior to visit.     No opioid medication identified on active medication list. I have reviewed chart for other potential  high risk medication/s and harmful drug interactions in the elderly.      Aspirin is on active medication list. Aspirin use is indicated based on review of current medical condition/s. Pros and cons of this therapy have been discussed today. Benefits of this medication outweigh potential harm.  Patient has been encouraged to continue taking this medication.  .      Patient Active Problem List   Diagnosis    Type 2 diabetes mellitus with hypoglycemia    NSTEMI (non-ST elevated myocardial infarction)    Pericardial effusion    Gastroesophageal reflux disease without esophagitis    Rib fracture    DNR (do not resuscitate)    CAD (coronary artery disease)    Primary insomnia    Acquired hypothyroidism    Mixed hyperlipidemia    Dizziness    Rectocele    OAB (overactive bladder)    Type 2 diabetes mellitus without complication, without long-term current use of insulin    Statin intolerance    Medically complex patient    Vitamin D deficiency    PND (post-nasal drip)    Chronic nausea    Stiffness of neck    Leukocytosis     Advance Care Planning Advance  "Directive is not on file.  ACP discussion was held with the patient during this visit. Patient does not have an advance directive, information provided.            Objective   Vitals:    25 1503   BP: 124/70   BP Location: Left arm   Patient Position: Sitting   Cuff Size: Adult   Pulse: 68   Temp: 97.5 °F (36.4 °C)   SpO2: 97%   Weight: 61.4 kg (135 lb 6.4 oz)   Height: 157.5 cm (62\")   PainSc: 0-No pain       Estimated body mass index is 24.76 kg/m² as calculated from the following:    Height as of this encounter: 157.5 cm (62\").    Weight as of this encounter: 61.4 kg (135 lb 6.4 oz).    BMI is within normal parameters. No other follow-up for BMI required.           Does the patient have evidence of cognitive impairment? No  Lab Results   Component Value Date    HGBA1C 7.9 (A) 2025                                                                                                Health  Risk Assessment    Smoking Status:  Social History     Tobacco Use   Smoking Status Former    Current packs/day: 0.00    Average packs/day: 1 pack/day for 30.0 years (30.0 ttl pk-yrs)    Types: Cigarettes    Start date:     Quit date:     Years since quittin.2   Smokeless Tobacco Never   Tobacco Comments    CAFFEINE USE- 2 CUPS COFFEE DAILY     Alcohol Consumption:  Social History     Substance and Sexual Activity   Alcohol Use No       Fall Risk Screen  STEADI Fall Risk Assessment was completed, and patient is at HIGH risk for falls. Assessment completed on:3/7/2025    Depression Screening   Little interest or pleasure in doing things? Not at all   Feeling down, depressed, or hopeless? Not at all   PHQ-2 Total Score 0      Health Habits and Functional and Cognitive Screening:      3/7/2025     3:04 PM   Functional & Cognitive Status   Do you have difficulty preparing food and eating? No   Do you have difficulty bathing yourself, getting dressed or grooming yourself? No   Do you have difficulty using the " toilet? No   Do you have difficulty moving around from place to place? No   Do you have trouble with steps or getting out of a bed or a chair? No   Current Diet Well Balanced Diet   Dental Exam Up to date   Eye Exam Up to date   Exercise (times per week) 0 times per week   Current Exercises Include No Regular Exercise   Do you need help using the phone?  No   Are you deaf or do you have serious difficulty hearing?  No   Do you need help to go to places out of walking distance? No   Do you need help shopping? No   Do you need help preparing meals?  No   Do you need help with housework?  No   Do you need help with laundry? No   Do you need help taking your medications? No   Do you need help managing money? No   Do you ever drive or ride in a car without wearing a seat belt? No   Have you felt unusual stress, anger or loneliness in the last month? No   Who do you live with? Child   If you need help, do you have trouble finding someone available to you? No   Have you been bothered in the last four weeks by sexual problems? No   Do you have difficulty concentrating, remembering or making decisions? No           Age-appropriate Screening Schedule:  Refer to the list below for future screening recommendations based on patient's age, sex and/or medical conditions. Orders for these recommended tests are listed in the plan section. The patient has been provided with a written plan.    Health Maintenance List  Health Maintenance   Topic Date Due    DXA SCAN  Never done    DIABETIC FOOT EXAM  Never done    Pneumococcal Vaccine 50+ (1 of 2 - PCV) Never done    ZOSTER VACCINE (1 of 2) Never done    RSV Vaccine - Adults (1 - 1-dose 75+ series) Never done    INFLUENZA VACCINE  07/01/2024    COVID-19 Vaccine (4 - 2024-25 season) 09/01/2024    ANNUAL WELLNESS VISIT  02/16/2025    URINE MICROALBUMIN-CREATININE RATIO (uACR)  02/16/2025    DIABETIC EYE EXAM  04/16/2025    HEMOGLOBIN A1C  09/07/2025    LIPID PANEL  10/04/2025    TDAP/TD  VACCINES (2 - Td or Tdap) 12/01/2031                                                                                                                                                CMS Preventative Services Quick Reference  Risk Factors Identified During Encounter  Immunizations Discussed/Encouraged: Influenza, Prevnar 20 (Pneumococcal 20-valent conjugate), Shingrix, COVID19, RSV (Respiratory Syncytial Virus), and declines further immunizations despite recommendations    The above risks/problems have been discussed with the patient.  Pertinent information has been shared with the patient in the After Visit Summary.  An After Visit Summary and PPPS were made available to the patient.    Follow Up:   Next Medicare Wellness visit to be scheduled in 1 year.         Additional E&M Note during same encounter follows:  Patient has additional, significant, and separately identifiable condition(s)/problem(s) that require work above and beyond the Medicare Wellness Visit     Chief Complaint  Medicare Wellness-subsequent (Yearly wellness), Diabetes, Hyperlipidemia, Hypothyroidism, Hypertension, Insomnia, Nausea (Has been staying nauseated), and Neck Pain (Some stiffness and hard to turn head no injury)    Subjective   Diabetes  Pertinent negatives for diabetes include no chest pain.   Hyperlipidemia  Exacerbating diseases include hypothyroidism. Pertinent negatives include no chest pain or shortness of breath.   Hypothyroidism  Her past medical history is significant for hyperlipidemia.   Hypertension  Pertinent negatives include no chest pain or shortness of breath.   Insomnia  Symptoms include nausea.    Pertinent negative symptoms include no chest pain, no cough and no fever.   Nausea  Symptoms include nausea.    Pertinent negative symptoms include no chest pain, no cough and no fever.     Lisbet is also being seen today for ADDITIONAL MEDICAL PROBLEM/S --- chronic nausea/uncontrolled, uncontrolled diabetes, new neck  pain/stiffness, and new Dx leukocytosis.    Also,  Needs 3 to 4-month follow-up for chronic insomnia, postnasal drainage  And  6-month follow-up on hyperlipidemia, CAD, hypothyroidism, HTN    Last visit with me in October 2024 for chronic med refills with labs, uncontrolled DM, uncontrolled postnasal drainage  -- A1c 7.2, no diabetic medication changes made.  Instructed to improve upon diabetic diet.  -- Chronic med refills with labs done, all stable except for very mild leukocytosis.  Asymptomatic.  -- Discussed adding Flonase due to constant postnasal drainage.    Other interval history since last seen by me --- recently seen by her cardiologist/Dr. Boris Meier about 6 WEEKS ago on 1/29/2025, records reviewed.  Yearly checkup for CAD.  No medication changes made.  Referred to ENT due to constant postnasal drainage.    For the most part, continues to do very well.  No falls, ER visits, or hospitalizations.  Still lives a very healthy and active lifestyle, walks 2 miles per day at least 4 days/week.  Goes to the gym with her girlfriends.  Mood remains good.    Sleeping fine with Ambien, generally 1/2 tablet (5 mg) nightly and on occasions may take 10 mg.  And, also takes hydroxyzine 25 mg nightly.  Weight remains stable.  No chest pain, SOA.     Does not check blood sugars routinely.  Admits she has not made any significant improvements with a diabetic diet in recent months, especially wintertime and holidays.   No changes made with Amaryl 4 mg BID in quite some time.  No further hypoglycemic episodes.  Denies polyuria, polydipsia.     However, today she has a few new complaints.  #1) complains of nausea, almost on a daily basis x the last 6 to 8 weeks.  She did have 2 episodes of vomiting back in January.  No new medications.  No fever, abdominal pain, diarrhea.  Appetite remains good.  Weight is stable.  Still has gallbladder.  Also still with lots of clearing of her throat and drainage.  Has upcoming  "appointment with ENT soon.    #2) complains of a stiff neck x 1 week.  No known injury.  No radiation.  Takes Aleve as needed, which does help.    #3) needs paperwork completed for [place] for daughter to help with transportation [place].       Needs chronic med refills.  Due for fasting labs.  Compliant with and tolerates all medications without side effects, except for high-dose statins and trazodone.  Also, good compliance with thyroid dosing regimen.         Review of Systems   Constitutional:  Negative for fever.   Respiratory:  Negative for cough and shortness of breath.    Cardiovascular:  Negative for chest pain.   Gastrointestinal:  Positive for nausea.   Psychiatric/Behavioral:  The patient has insomnia.               Objective   Vital Signs:  /70 (BP Location: Left arm, Patient Position: Sitting, Cuff Size: Adult)   Pulse 68   Temp 97.5 °F (36.4 °C)   Ht 157.5 cm (62\")   Wt 61.4 kg (135 lb 6.4 oz)   SpO2 97%   BMI 24.76 kg/m²   Physical Exam  Vitals and nursing note reviewed.   Constitutional:       General: She is not in acute distress.     Appearance: Normal appearance. She is well-developed. She is not ill-appearing, toxic-appearing or diaphoretic.   HENT:      Head: Normocephalic and atraumatic.      Comments: Oropharynx --lots of gray cobblestoning drainage only.  Constant clearing of throat     Right Ear: Tympanic membrane normal.      Left Ear: Tympanic membrane normal.      Nose: Nose normal.   Eyes:      Conjunctiva/sclera: Conjunctivae normal.      Pupils: Pupils are equal, round, and reactive to light.   Neck:      Thyroid: No thyromegaly.   Cardiovascular:      Rate and Rhythm: Normal rate and regular rhythm.      Heart sounds: Normal heart sounds. No murmur heard.  Pulmonary:      Effort: Pulmonary effort is normal. No respiratory distress.      Breath sounds: Normal breath sounds. No wheezing.   Abdominal:      General: Abdomen is flat. Bowel sounds are normal. There is no " distension.      Palpations: Abdomen is soft. There is no hepatomegaly, splenomegaly or mass.      Tenderness: There is no abdominal tenderness. There is no guarding or rebound.      Hernia: No hernia is present.   Musculoskeletal:         General: Normal range of motion.      Cervical back: Normal range of motion and neck supple.      Right lower leg: No edema.      Left lower leg: No edema.   Feet:      Right foot:      Skin integrity: Skin integrity normal. No ulcer, blister or skin breakdown.      Left foot:      Skin integrity: Skin integrity normal. No ulcer, blister or skin breakdown.      Comments: S/P diabetic foot exam done, WNL  Lymphadenopathy:      Cervical: No cervical adenopathy.   Skin:     General: Skin is warm.   Neurological:      General: No focal deficit present.      Mental Status: She is alert.   Psychiatric:         Mood and Affect: Mood normal.         Behavior: Behavior normal.         Thought Content: Thought content normal.         Judgment: Judgment normal.           Common labs          5/31/2024    15:15 10/4/2024    15:52 10/4/2024    16:03 3/7/2025    15:54   Common Labs   Glucose   63     BUN   26     Creatinine   0.97     Sodium   140     Potassium   4.8     Chloride   104     Calcium   9.5     Albumin   4.4     Total Bilirubin   0.5     Alkaline Phosphatase   72     AST (SGOT)   23     ALT (SGPT)   14     WBC   11.21     Hemoglobin   13.4     Hematocrit   42.0     Platelets   249     Total Cholesterol   164     Triglycerides   149     HDL Cholesterol   46     LDL Cholesterol    92     Hemoglobin A1C 7.6  7.2   7.9      Lipid Panel          10/4/2024    16:03   Lipid Panel   Total Cholesterol 164    Triglycerides 149    HDL Cholesterol 46    VLDL Cholesterol 26    LDL Cholesterol  92    LDL/HDL Ratio 1.92      TSH          10/4/2024    16:03   TSH   TSH 2.280      A1C Last 3 Results          5/31/2024    15:15 10/4/2024    15:52 3/7/2025    15:54   HGBA1C Last 3 Results    Hemoglobin A1C 7.6  7.2  7.9          Lab Results   Component Value Date    VHHH97EW 25.6 (L) 10/04/2024               Assessment and Plan               Medicare annual wellness visit, subsequent  S/P subsequent  exam done, WNL  Needed screening includes DEXA and multiple vaccinations, but declines despite recommendations.  See above HPI.  Have discussed and provided information regarding Living Will.  Otherwise, continue with healthy lifestyle, doing a great job.  Needs low-carb/low calorie/low cholesterol diet and increase cardio exercise to greater than 150 minutes weekly  Chronic nausea  New Dx x 6 to 8 weeks.  Etiology?  Suspect this may be due to lots of postnasal drainage and/or more uncontrolled diabetes?  Development of gastroparesis?  Besides nausea, completely asymptomatic.  She did have some mild leukocytosis at last visit in October 2024.  Recheck CBC, CMP, lipase.  Further recommendations to follow.  May start Zofran as needed.  Needs better diabetic control, see plan below.  Has upcoming appointment with ENT due to uncontrolled PND  Leukocytosis, unspecified type  New Dx.  Mild.  With some chronic nausea during the last 6 to 8 weeks.  Recheck labs today.  See above plan  No recent illnesses.  PND (post-nasal drip)  Uncontrolled  Failed antihistamine, Flonase.  Has appointment with ENT soon  Stiffness of neck  New Dx x 1 week.  Suspect this to be musculoskeletal only.  No radicular symptoms.  May continue Aleve as needed provided renal function and CBC are normal.  Start Flexeril 5 mg 1 p.o. 3 times daily as needed  Type 2 diabetes mellitus with hyperglycemia, without long-term current use of insulin  Uncontrolled, today's A1c 7.9  Suggested adding an additional medication today for diabetes given Amaryl at maximum dose.  However, declines.  Prefers to get back on track with a better diabetic diet.  Failed Actos in past/rash.  Metformin GI intolerance.  Continue Amaryl at 4 mg BID  Due to update  urine microalbumin.  Diabetic eye exam up-to-date.  S/P diabetic foot exam done, WNL  CAD in native artery  Stable  Continue ASA and aggressive risk factor control.  Sees cardiology yearly    Mixed hyperlipidemia  Fair control  Ideally goal LDL needs to be less than 70 given history of diabetes and CAD.  However intolerant to higher dose statins.  Recheck lipids and LFTs today.  Plan to continue Crestor 5 mg daily and Zetia 10 mg daily  Primary insomnia  Controlled  Doing well with hydroxyzine 25 mg nightly and Ambien 5 mg nightly.  Refill Ambien for 90-day supply.  Urine Tox screen and controlled substance contract up-to-date/due to update Urine Tox screen and contract today.  Appropriate.  JACEY/ INSPECT report reviewed, appropriate.  Adequate pain relief, good medication adherence, and low risk behaviors for misuse.  Acquired hypothyroidism  Controlled  Recheck TSH, if therapeutic then no change with Synthroid  Vitamin D deficiency    High risk medication use    Medically complex patient  See all the above active chronic diagnoses that require close monitoring and longitudinal care.    Orders Placed This Encounter   Procedures    Lipid Panel With LDL / HDL Ratio     Order Specific Question:   Release to patient     Answer:   Routine Release [[id number]]    TSH     Order Specific Question:   Release to patient     Answer:   Routine Release [[id number]]    Vitamin D,25-Hydroxy     Order Specific Question:   Release to patient     Answer:   Routine Release [[id number]]    Lipase     Order Specific Question:   Release to patient     Answer:   Routine Release [[id number]]    ToxASSURE Select 13 Discrete -     Order Specific Question:   Release to patient     Answer:   Routine Release [[id number]]    Microalbumin / Creatinine Urine Ratio - Urine, Clean Catch     Order Specific Question:   Release to patient     Answer:   Routine Release [[id number]]    Comprehensive Metabolic Panel     Order Specific Question:    Release to patient     Answer:   Routine Release [[id number]]    POC Glycosylated Hemoglobin (Hb A1C)     Order Specific Question:   Release to patient     Answer:   Routine Release [[id number]]    CBC & Differential     Order Specific Question:   Manual Differential     Answer:   No     Order Specific Question:   Release to patient     Answer:   Routine Release [[id number]]     New Medications Ordered This Visit   Medications    cyclobenzaprine (FLEXERIL) 5 MG tablet     Sig: Take 1 tablet by mouth 3 (Three) Times a Day As Needed for Muscle Spasms.     Dispense:  20 tablet     Refill:  1    ondansetron (Zofran) 4 MG tablet     Sig: Take 1 tablet by mouth Every 8 (Eight) Hours As Needed for Nausea or Vomiting.     Dispense:  20 tablet     Refill:  1    zolpidem (AMBIEN) 10 MG tablet     Sig: TAKE 1/2  OR 1  TABLET  NIGHTLY AS NEEDED     Dispense:  90 tablet     Refill:  0          Follow Up   Return in about 3 months (around 6/7/2025) for Recheck.  Patient was given instructions and counseling regarding her condition or for health maintenance advice. Please see specific information pulled into the AVS if appropriate.

## 2025-03-07 NOTE — ASSESSMENT & PLAN NOTE
New Dx x 1 week.  Suspect this to be musculoskeletal only.  No radicular symptoms.  May continue Aleve as needed provided renal function and CBC are normal.  Start Flexeril 5 mg 1 p.o. 3 times daily as needed

## 2025-03-07 NOTE — ASSESSMENT & PLAN NOTE
New Dx.  Mild.  With some chronic nausea during the last 6 to 8 weeks.  Recheck labs today.  See above plan  No recent illnesses.

## 2025-03-07 NOTE — ASSESSMENT & PLAN NOTE
New Dx x 6 to 8 weeks.  Etiology?  Suspect this may be due to lots of postnasal drainage and/or more uncontrolled diabetes?  Development of gastroparesis?  Besides nausea, completely asymptomatic.  She did have some mild leukocytosis at last visit in October 2024.  Recheck CBC, CMP, lipase.  Further recommendations to follow.  May start Zofran as needed.  Needs better diabetic control, see plan below.  Has upcoming appointment with ENT due to uncontrolled PND

## 2025-03-13 LAB
25(OH)D3+25(OH)D2 SERPL-MCNC: 44.1 NG/ML (ref 30–100)
6MAM UR QL CFM: NEGATIVE
6MAM/CREAT UR: NOT DETECTED NG/MG CREAT
7AMINOCLONAZEPAM/CREAT UR: NOT DETECTED NG/MG CREAT
A-OH ALPRAZ/CREAT UR: NOT DETECTED NG/MG CREAT
A-OH-TRIAZOLAM/CREAT UR CFM: NOT DETECTED NG/MG CREAT
ALBUMIN SERPL-MCNC: 4.3 G/DL (ref 3.6–4.6)
ALBUMIN/CREAT UR: 51 MG/G CREAT (ref 0–29)
ALFENTANIL/CREAT UR CFM: NOT DETECTED NG/MG CREAT
ALP SERPL-CCNC: 81 IU/L (ref 44–121)
ALPHA-HYDROXYMIDAZOLAM, URINE: NOT DETECTED NG/MG CREAT
ALPRAZ/CREAT UR CFM: NOT DETECTED NG/MG CREAT
ALT SERPL-CCNC: 17 IU/L (ref 0–32)
AMOBARBITAL UR QL CFM: NOT DETECTED
AMPHET/CREAT UR: NOT DETECTED NG/MG CREAT
AMPHETAMINES UR QL CFM: NEGATIVE
AST SERPL-CCNC: 22 IU/L (ref 0–40)
BARBITAL UR QL CFM: NOT DETECTED
BARBITURATES UR QL CFM: NEGATIVE
BASOPHILS # BLD AUTO: 0.1 X10E3/UL (ref 0–0.2)
BASOPHILS NFR BLD AUTO: 1 %
BENZODIAZ UR QL CFM: NEGATIVE
BILIRUB SERPL-MCNC: 0.6 MG/DL (ref 0–1.2)
BUN SERPL-MCNC: 26 MG/DL (ref 10–36)
BUN/CREAT SERPL: 25 (ref 12–28)
BUPRENORPHINE UR QL CFM: NEGATIVE
BUPRENORPHINE/CREAT UR: NOT DETECTED NG/MG CREAT
BUTABARBITAL UR QL CFM: NOT DETECTED
BUTALBITAL UR QL CFM: NOT DETECTED
BZE/CREAT UR: NOT DETECTED NG/MG CREAT
CALCIUM SERPL-MCNC: 9.6 MG/DL (ref 8.7–10.3)
CANNABINOIDS UR QL CFM: NEGATIVE
CARBOXYTHC/CREAT UR: NOT DETECTED NG/MG CREAT
CHLORIDE SERPL-SCNC: 104 MMOL/L (ref 96–106)
CHOLEST SERPL-MCNC: 178 MG/DL (ref 100–199)
CLONAZEPAM/CREAT UR CFM: NOT DETECTED NG/MG CREAT
CO2 SERPL-SCNC: 22 MMOL/L (ref 20–29)
COCAETHYLENE/CREAT UR CFM: NOT DETECTED NG/MG CREAT
COCAINE UR QL CFM: NEGATIVE
COCAINE/CREAT UR CFM: NOT DETECTED NG/MG CREAT
CODEINE/CREAT UR: NOT DETECTED NG/MG CREAT
CREAT SERPL-MCNC: 1.03 MG/DL (ref 0.57–1)
CREAT UR-MCNC: 51.1 MG/DL
CREAT UR-MCNC: 52 MG/DL
DESALKYLFLURAZ/CREAT UR: NOT DETECTED NG/MG CREAT
DESMETHYLFLUNITRAZEPAM: NOT DETECTED NG/MG CREAT
DHC/CREAT UR: NOT DETECTED NG/MG CREAT
DIAZEPAM/CREAT UR: NOT DETECTED NG/MG CREAT
DRUGS UR: NORMAL
EDDP/CREAT UR: NOT DETECTED NG/MG CREAT
EGFRCR SERPLBLD CKD-EPI 2021: 52 ML/MIN/1.73
EOSINOPHIL # BLD AUTO: 0.4 X10E3/UL (ref 0–0.4)
EOSINOPHIL NFR BLD AUTO: 3 %
ERYTHROCYTE [DISTWIDTH] IN BLOOD BY AUTOMATED COUNT: 12.3 % (ref 11.7–15.4)
ETHANOL UR CFM-MCNC: NOT DETECTED G/DL
ETHANOL UR QL CFM: NEGATIVE
FENTANYL UR QL CFM: NEGATIVE
FENTANYL/CREAT UR: NOT DETECTED NG/MG CREAT
FLUNITRAZEPAM UR QL CFM: NOT DETECTED NG/MG CREAT
GLOBULIN SER CALC-MCNC: 3.1 G/DL (ref 1.5–4.5)
GLUCOSE SERPL-MCNC: 82 MG/DL (ref 70–99)
HCT VFR BLD AUTO: 42.6 % (ref 34–46.6)
HDLC SERPL-MCNC: 48 MG/DL
HGB BLD-MCNC: 13.7 G/DL (ref 11.1–15.9)
HYDROCODONE/CREAT UR: NOT DETECTED NG/MG CREAT
HYDROMORPHONE/CREAT UR: NOT DETECTED NG/MG CREAT
IMM GRANULOCYTES # BLD AUTO: 0 X10E3/UL (ref 0–0.1)
IMM GRANULOCYTES NFR BLD AUTO: 0 %
LDLC SERPL CALC-MCNC: 94 MG/DL (ref 0–99)
LDLC/HDLC SERPL: 2 RATIO (ref 0–3.2)
LIPASE SERPL-CCNC: 24 U/L (ref 14–85)
LORAZEPAM/CREAT UR: NOT DETECTED NG/MG CREAT
LYMPHOCYTES # BLD AUTO: 3.8 X10E3/UL (ref 0.7–3.1)
LYMPHOCYTES NFR BLD AUTO: 35 %
MCH RBC QN AUTO: 29.3 PG (ref 26.6–33)
MCHC RBC AUTO-ENTMCNC: 32.2 G/DL (ref 31.5–35.7)
MCV RBC AUTO: 91 FL (ref 79–97)
MDA/CREAT UR: NOT DETECTED NG/MG CREAT
MDMA/CREAT UR: NOT DETECTED NG/MG CREAT
MEPHOBARBITAL UR QL CFM: NOT DETECTED
METHADONE UR QL CFM: NEGATIVE
METHADONE/CREAT UR: NOT DETECTED NG/MG CREAT
METHAMPHET/CREAT UR: NOT DETECTED NG/MG CREAT
MICROALBUMIN UR-MCNC: 26.3 UG/ML
MIDAZOLAM/CREAT UR CFM: NOT DETECTED NG/MG CREAT
MONOCYTES # BLD AUTO: 0.9 X10E3/UL (ref 0.1–0.9)
MONOCYTES NFR BLD AUTO: 8 %
MORPHINE/CREAT UR: NOT DETECTED NG/MG CREAT
N-NORTRAMADOL/CREAT UR CFM: NOT DETECTED NG/MG CREAT
NARCOTICS UR: NEGATIVE
NEUTROPHILS # BLD AUTO: 5.7 X10E3/UL (ref 1.4–7)
NEUTROPHILS NFR BLD AUTO: 53 %
NORBUPRENORPHINE/CREAT UR: NOT DETECTED NG/MG CREAT
NORCODEINE/CREAT UR CFM: NOT DETECTED NG/MG CREAT
NORDIAZEPAM/CREAT UR: NOT DETECTED NG/MG CREAT
NORFENTANYL/CREAT UR: NOT DETECTED NG/MG CREAT
NORHYDROCODONE/CREAT UR: NOT DETECTED NG/MG CREAT
NORMORPHINE UR-MCNC: NOT DETECTED NG/MG CREAT
NOROXYCODONE/CREAT UR: NOT DETECTED NG/MG CREAT
NOROXYMORPHONE/CREAT UR CFM: NOT DETECTED NG/MG CREAT
O-NORTRAMADOL UR CFM-MCNC: NOT DETECTED NG/MG CREAT
OPIATES UR QL CFM: NEGATIVE
OXAZEPAM/CREAT UR: NOT DETECTED NG/MG CREAT
OXYCODONE UR QL CFM: NEGATIVE
OXYCODONE/CREAT UR: NOT DETECTED NG/MG CREAT
OXYMORPHONE/CREAT UR: NOT DETECTED NG/MG CREAT
PENTOBARB UR QL CFM: NOT DETECTED
PHENOBARB UR QL CFM: NOT DETECTED
PLATELET # BLD AUTO: 286 X10E3/UL (ref 150–450)
POTASSIUM SERPL-SCNC: 5.1 MMOL/L (ref 3.5–5.2)
PROT SERPL-MCNC: 7.4 G/DL (ref 6–8.5)
RBC # BLD AUTO: 4.68 X10E6/UL (ref 3.77–5.28)
SECOBARBITAL UR QL CFM: NOT DETECTED
SERVICE CMNT 02-IMP: NORMAL
SODIUM SERPL-SCNC: 141 MMOL/L (ref 134–144)
SUFENTANIL/CREAT UR CFM: NOT DETECTED NG/MG CREAT
TAPENTADOL UR QL CFM: NEGATIVE
TAPENTADOL/CREAT UR: NOT DETECTED NG/MG CREAT
TEMAZEPAM/CREAT UR: NOT DETECTED NG/MG CREAT
THIOPENTAL UR QL CFM: NOT DETECTED
TRAMADOL UR QL CFM: NOT DETECTED NG/MG CREAT
TRIGL SERPL-MCNC: 213 MG/DL (ref 0–149)
TSH SERPL DL<=0.005 MIU/L-ACNC: 1.23 UIU/ML (ref 0.45–4.5)
VLDLC SERPL CALC-MCNC: 36 MG/DL (ref 5–40)
WBC # BLD AUTO: 10.9 X10E3/UL (ref 3.4–10.8)

## 2025-04-23 RX ORDER — ROSUVASTATIN CALCIUM 5 MG/1
5 TABLET, COATED ORAL DAILY
Qty: 90 TABLET | Refills: 3 | Status: SHIPPED | OUTPATIENT
Start: 2025-04-23

## 2025-05-12 RX ORDER — LISINOPRIL 20 MG/1
20 TABLET ORAL DAILY
Qty: 90 TABLET | Refills: 3 | Status: SHIPPED | OUTPATIENT
Start: 2025-05-12

## 2025-05-12 RX ORDER — ATENOLOL 25 MG/1
25 TABLET ORAL 2 TIMES DAILY
Qty: 180 TABLET | Refills: 3 | Status: SHIPPED | OUTPATIENT
Start: 2025-05-12

## 2025-06-25 ENCOUNTER — OFFICE VISIT (OUTPATIENT)
Dept: FAMILY MEDICINE CLINIC | Facility: CLINIC | Age: OVER 89
End: 2025-06-25
Payer: MEDICARE

## 2025-06-25 VITALS
WEIGHT: 130 LBS | OXYGEN SATURATION: 97 % | SYSTOLIC BLOOD PRESSURE: 100 MMHG | TEMPERATURE: 97.5 F | HEART RATE: 68 BPM | HEIGHT: 62 IN | BODY MASS INDEX: 23.92 KG/M2 | DIASTOLIC BLOOD PRESSURE: 68 MMHG

## 2025-06-25 DIAGNOSIS — E11.65 TYPE 2 DIABETES MELLITUS WITH HYPERGLYCEMIA, WITHOUT LONG-TERM CURRENT USE OF INSULIN: Primary | ICD-10-CM

## 2025-06-25 DIAGNOSIS — Z78.9 MEDICALLY COMPLEX PATIENT: ICD-10-CM

## 2025-06-25 DIAGNOSIS — R11.0 CHRONIC NAUSEA: ICD-10-CM

## 2025-06-25 DIAGNOSIS — F51.01 PRIMARY INSOMNIA: ICD-10-CM

## 2025-06-25 DIAGNOSIS — N18.31 STAGE 3A CHRONIC KIDNEY DISEASE: ICD-10-CM

## 2025-06-25 DIAGNOSIS — I25.10 CAD IN NATIVE ARTERY: ICD-10-CM

## 2025-06-25 DIAGNOSIS — J30.9 CHRONIC ALLERGIC RHINITIS: ICD-10-CM

## 2025-06-25 LAB
EXPIRATION DATE: ABNORMAL
HBA1C MFR BLD: 8.1 % (ref 4.5–5.7)
Lab: ABNORMAL

## 2025-06-25 RX ORDER — LISINOPRIL 20 MG/1
20 TABLET ORAL DAILY
Qty: 90 TABLET | Refills: 3 | Status: CANCELLED | OUTPATIENT
Start: 2025-06-25

## 2025-06-25 RX ORDER — ONDANSETRON 4 MG/1
4 TABLET, FILM COATED ORAL EVERY 8 HOURS PRN
Qty: 20 TABLET | Refills: 1 | Status: CANCELLED | OUTPATIENT
Start: 2025-06-25

## 2025-06-25 RX ORDER — LOTILANER OPHTHALMIC SOLUTION 2.5 MG/ML
SOLUTION/ DROPS OPHTHALMIC
COMMUNITY
Start: 2025-06-23

## 2025-06-25 RX ORDER — PIOGLITAZONE 15 MG/1
15 TABLET ORAL DAILY
Qty: 30 TABLET | Refills: 3 | Status: SHIPPED | OUTPATIENT
Start: 2025-06-25

## 2025-06-25 NOTE — PATIENT INSTRUCTIONS
Add Actos 15 mg 1 p.o. daily for better diabetes control    Continue all other medications as prescribed, no other changes

## 2025-06-25 NOTE — PROGRESS NOTES
Chief Complaint  Diabetes (3 months check up), Hyperlipidemia, Hypertension, and Insomnia    3-month follow-up on diabetes, chronic nausea, PND, chronic allergic rhinitis, CKD, and chronic insomnia    Last visit with me in March 2025 for Medicare wellness, chronic med refills with labs, uncontrolled diabetes, uncontrolled chronic nausea, acute neck pain  Multiple things done at last visit.  --Screening studies and screening labs all updated, WNL/stable  -- A1c 7.9, discussed adding another medication but she declined.  Wanted the option to improve upon her diet first  -- Positive microalbumin, increased lisinopril to 20 mg daily  -- Treated for acute neck pain/stiffness with low-dose Flexeril as needed.  -- Discussed some chronic nausea, thought to be secondary to uncontrolled PND/chronic allergic rhinitis.  Already had upcoming appointment with ENT.  -- Updated Urine Tox screen, controlled substance contract, and routine 6-month labs.  All stable.      Other interval history since last seen by me --- maintains yearly follow-up with cardiologist, next appointment December 2025.  Also recently seen by ENT for consultation about 3 MONTHS AGO, records reviewed  Seen Dr. Tejinder Bob  ENT - SCAN - SINUS, [facility], [id number] (03/14/2025) --- uncontrolled chronic allergic rhinitis, cough, GERD  S/P nasopharyngoscopy completed, mild deviated septum only.  Uncontrolled allergic rhinitis.  Recommendations were to add ipratropium nasal spray, PPI, and arrange for allergy testing.    She did increase the lisinopril as requested.  Tolerating without side effects.  However, never did start the new nasal spray or PPI.  And declines allergy testing.  Although, doing better overall.  No longer bothered by her allergies or drainage.  No longer having any issues with chronic nausea.      Continues to do remarkably well given her advanced age.  No falls, ER visits, or hospitalizations.  Still lives a very healthy and active  "lifestyle, walks 2 miles per day at least 4 days/week.  Goes to the gym with her girlfriends.  Even walked 2 miles this morning in this 90 degree weather!!  Mood remains good.    Sleeping fine with Ambien, generally 1/2 tablet (5 mg) nightly and on occasions may take 10 mg.  And, also takes hydroxyzine 25 mg nightly.  Weight remains stable.  No chest pain, SOA.     Does not check blood sugars routinely.  Does try to maintain a diabetic diet.  Could do better watching her sweets though?   No changes made with Amaryl 4 mg BID in quite some time.  Does not recall being treated with any other diabetic meds in past?  Although per allergy list had a rash with Actos?  No further hypoglycemic episodes.  Denies polyuria, polydipsia.     No new complaints or concerns.  Needs chronic med refills.  Fasting lab work up-to-date, completed at last visit 3 months ago.  Compliant with and tolerates all medications without side effects, except for high-dose statins and trazodone.  Also, good compliance with thyroid dosing regimen.    Still maintained on Ambien 5 mg to 10 mg nightly (also takes hydroxyzine 25 mg nightly) for diagnosis of chronic insomnia.  This allows adequate pain relief, enjoyment in life, and provides the ability to maintain general activity level.  There has been no change in history that would increase the risk of overdose or other adverse events.        Review of Systems   Constitutional:  Negative for fever and unexpected weight change.   Respiratory:  Negative for cough and shortness of breath.    Cardiovascular:  Negative for chest pain.        Subjective          Lisbet J Sheets presents to [facility]    Objective   Vital Signs:   Vitals:    06/25/25 1453   BP: 100/68   BP Location: Left arm   Patient Position: Sitting   Cuff Size: Adult   Pulse: 68   Temp: 97.5 °F (36.4 °C)   SpO2: 97%   Weight: 59 kg (130 lb)   Height: 157.5 cm (62\")      Body mass index is 23.78 kg/m². "   Physical Exam  Vitals and nursing note reviewed.   Constitutional:       General: She is not in acute distress.     Appearance: Normal appearance. She is well-developed and normal weight. She is not ill-appearing.   HENT:      Head: Normocephalic and atraumatic.      Nose: Nose normal.   Eyes:      Conjunctiva/sclera: Conjunctivae normal.      Pupils: Pupils are equal, round, and reactive to light.   Neck:      Thyroid: No thyromegaly.   Cardiovascular:      Rate and Rhythm: Normal rate and regular rhythm.      Heart sounds: Normal heart sounds. No murmur heard.  Pulmonary:      Effort: Pulmonary effort is normal. No respiratory distress.      Breath sounds: Normal breath sounds. No wheezing or rales.   Abdominal:      General: Abdomen is flat. Bowel sounds are normal. There is no distension.      Palpations: Abdomen is soft. There is no hepatomegaly, splenomegaly or mass.      Tenderness: There is no abdominal tenderness. There is no guarding or rebound.      Hernia: No hernia is present.   Musculoskeletal:         General: Normal range of motion.      Cervical back: Normal range of motion and neck supple.      Right lower leg: No edema.      Left lower leg: No edema.   Lymphadenopathy:      Cervical: No cervical adenopathy.   Skin:     General: Skin is warm.   Neurological:      General: No focal deficit present.      Mental Status: She is alert.   Psychiatric:         Mood and Affect: Mood normal.         Behavior: Behavior normal.         Thought Content: Thought content normal.         Judgment: Judgment normal.        Result Review :     Common labs          10/4/2024    15:52 10/4/2024    16:03 3/7/2025    15:54 3/7/2025    15:55 6/25/2025    15:09   Common Labs   Glucose  63   82     BUN  26   26     Creatinine  0.97   1.03     Sodium  140   141     Potassium  4.8   5.1     Chloride  104   104     Calcium  9.5   9.6     Albumin  4.4   4.3     Total Bilirubin  0.5   0.6     Alkaline Phosphatase  72   81      AST (SGOT)  23   22     ALT (SGPT)  14   17     WBC  11.21   10.9     Hemoglobin  13.4   13.7     Hematocrit  42.0   42.6     Platelets  249   286     Total Cholesterol  164   178     Triglycerides  149   213     HDL Cholesterol  46   48     LDL Cholesterol   92   94     Hemoglobin A1C 7.2   7.9   8.1    Microalbumin, Urine    26.3       Lipid Panel          10/4/2024    16:03 3/7/2025    15:55   Lipid Panel   Total Cholesterol 164  178    Triglycerides 149  213    HDL Cholesterol 46  48    VLDL Cholesterol 26  36    LDL Cholesterol  92  94    LDL/HDL Ratio 1.92  2.0      TSH          10/4/2024    16:03 3/7/2025    15:55   TSH   TSH 2.280  1.230            Lab Results   Component Value Date    ESQK26XZ 44.1 03/07/2025                   Assessment and Plan    Diagnoses and all orders for this visit:    1. Type 2 diabetes mellitus with hyperglycemia, without long-term current use of insulin (Primary) --remains uncontrolled, today's A1c 8.1  Diabetes is getting worse despite her healthy lifestyle.  Really do need to add another medication.  Several options discussed.  Needs generic medication.  Hesitant/really does not want to start metformin (does not like what she heard?)  Agrees to try Actos 15 mg daily.  Note, allergy list said rash but she has no recollection of this??  Needs to continue Amaryl 4 mg BID  Really would like for her to start checking fasting blood sugar and nightly blood sugars at least a few days per week.  May report numbers in 2 to 4 weeks.  Call if problem with new medication, if issues may need to try Jardiance if still reluctant to trying metformin?  -     POC Glycosylated Hemoglobin (Hb A1C)    2. Stage 3a chronic kidney disease --stable  Continue to avoid NSAIDs.  Continue to monitor, recheck at follow-up in 3 months    3. Chronic nausea --resolved  Likely this was from her uncontrolled PND    4. Chronic allergic rhinitis --stable  S/P ENT records reviewed.  S/P nasopharyngoscopy.  They did  recommend adding ipratropium nasal spray and allergy testing, but she declines  Doing well with OTC antihistamine and Flonase    5. Primary insomnia --controlled  No change with hydroxyzine or Ambien 5 to 10 mg nightly, recently refilled for 90-day supply.  Urine Tox screen and controlled substance contract up-to-date/March 2025 appropriate.  JACEY/ INSPECT report reviewed, appropriate.  Adequate sleep, good medication adherence, and low risk behaviors for misuse.    6. CAD in native artery --stable, continue ASA and aggressive risk factor control.  Labs up-to-date    7. Medically complex patient --See all the above active chronic diagnoses that require close monitoring and longitudinal care.    Other orders  -     pioglitazone (Actos) 15 MG tablet; Take 1 tablet by mouth Daily.  Dispense: 30 tablet; Refill: 3      CMP, TSH, and lipids due at next visit        Follow Up   Return in about 3 months (around 9/25/2025) for Recheck.  Patient was given instructions and counseling regarding her condition or for health maintenance advice. Please see specific information pulled into the AVS if appropriate.

## 2025-08-16 DIAGNOSIS — E03.9 ACQUIRED HYPOTHYROIDISM: ICD-10-CM

## 2025-08-16 DIAGNOSIS — F51.01 PRIMARY INSOMNIA: ICD-10-CM

## 2025-08-18 RX ORDER — LEVOTHYROXINE SODIUM 75 UG/1
75 TABLET ORAL DAILY
Qty: 90 TABLET | Refills: 3 | Status: SHIPPED | OUTPATIENT
Start: 2025-08-18

## 2025-08-19 RX ORDER — PIOGLITAZONE 15 MG/1
15 TABLET ORAL DAILY
Qty: 90 TABLET | Refills: 3 | Status: SHIPPED | OUTPATIENT
Start: 2025-08-19

## 2025-08-20 RX ORDER — ZOLPIDEM TARTRATE 10 MG/1
TABLET ORAL
Qty: 90 TABLET | Refills: 0 | Status: SHIPPED | OUTPATIENT
Start: 2025-08-20